# Patient Record
Sex: FEMALE | Race: WHITE | NOT HISPANIC OR LATINO | Employment: OTHER | URBAN - METROPOLITAN AREA
[De-identification: names, ages, dates, MRNs, and addresses within clinical notes are randomized per-mention and may not be internally consistent; named-entity substitution may affect disease eponyms.]

---

## 2017-03-23 ENCOUNTER — COMMUNICATION - HEALTHEAST (OUTPATIENT)
Dept: FAMILY MEDICINE | Facility: CLINIC | Age: 82
End: 2017-03-23

## 2017-03-23 ENCOUNTER — OFFICE VISIT - HEALTHEAST (OUTPATIENT)
Dept: FAMILY MEDICINE | Facility: CLINIC | Age: 82
End: 2017-03-23

## 2017-03-23 DIAGNOSIS — R73.09 ELEVATED GLUCOSE: ICD-10-CM

## 2017-03-23 DIAGNOSIS — I25.10 ATHEROSCLEROSIS OF CORONARY ARTERY: ICD-10-CM

## 2017-03-23 DIAGNOSIS — I10 BENIGN ESSENTIAL HYPERTENSION: ICD-10-CM

## 2017-07-06 ENCOUNTER — COMMUNICATION - HEALTHEAST (OUTPATIENT)
Dept: FAMILY MEDICINE | Facility: CLINIC | Age: 82
End: 2017-07-06

## 2017-07-06 DIAGNOSIS — I10 BENIGN ESSENTIAL HYPERTENSION: ICD-10-CM

## 2017-10-01 ENCOUNTER — COMMUNICATION - HEALTHEAST (OUTPATIENT)
Dept: FAMILY MEDICINE | Facility: CLINIC | Age: 82
End: 2017-10-01

## 2017-10-01 DIAGNOSIS — I10 BENIGN ESSENTIAL HYPERTENSION: ICD-10-CM

## 2017-12-29 ENCOUNTER — COMMUNICATION - HEALTHEAST (OUTPATIENT)
Dept: FAMILY MEDICINE | Facility: CLINIC | Age: 82
End: 2017-12-29

## 2017-12-29 DIAGNOSIS — E78.00 PURE HYPERCHOLESTEROLEMIA: ICD-10-CM

## 2018-04-02 ENCOUNTER — OFFICE VISIT - HEALTHEAST (OUTPATIENT)
Dept: FAMILY MEDICINE | Facility: CLINIC | Age: 83
End: 2018-04-02

## 2018-04-02 DIAGNOSIS — E78.00 PURE HYPERCHOLESTEROLEMIA: ICD-10-CM

## 2018-04-02 DIAGNOSIS — I10 BENIGN ESSENTIAL HYPERTENSION: ICD-10-CM

## 2018-04-02 DIAGNOSIS — I10 WHITE COAT SYNDROME WITH HYPERTENSION: ICD-10-CM

## 2018-04-02 DIAGNOSIS — I25.10 ATHEROSCLEROSIS OF CORONARY ARTERY: ICD-10-CM

## 2018-04-02 DIAGNOSIS — R01.1 HEART MURMUR: ICD-10-CM

## 2018-04-02 LAB
ALBUMIN SERPL-MCNC: 4.2 G/DL (ref 3.5–5)
ALP SERPL-CCNC: 35 U/L (ref 45–120)
ALT SERPL W P-5'-P-CCNC: 25 U/L (ref 0–45)
ANION GAP SERPL CALCULATED.3IONS-SCNC: 8 MMOL/L (ref 5–18)
AST SERPL W P-5'-P-CCNC: 30 U/L (ref 0–40)
BILIRUB SERPL-MCNC: 0.5 MG/DL (ref 0–1)
BUN SERPL-MCNC: 11 MG/DL (ref 8–28)
CALCIUM SERPL-MCNC: 10.1 MG/DL (ref 8.5–10.5)
CHLORIDE BLD-SCNC: 103 MMOL/L (ref 98–107)
CHOLEST SERPL-MCNC: 183 MG/DL
CO2 SERPL-SCNC: 31 MMOL/L (ref 22–31)
CREAT SERPL-MCNC: 0.88 MG/DL (ref 0.6–1.1)
FASTING STATUS PATIENT QL REPORTED: NO
GFR SERPL CREATININE-BSD FRML MDRD: >60 ML/MIN/1.73M2
GLUCOSE BLD-MCNC: 104 MG/DL (ref 70–125)
HDLC SERPL-MCNC: 67 MG/DL
LDLC SERPL CALC-MCNC: 92 MG/DL
POTASSIUM BLD-SCNC: 4.4 MMOL/L (ref 3.5–5)
PROT SERPL-MCNC: 7.4 G/DL (ref 6–8)
SODIUM SERPL-SCNC: 142 MMOL/L (ref 136–145)
TRIGL SERPL-MCNC: 121 MG/DL

## 2018-04-03 ENCOUNTER — COMMUNICATION - HEALTHEAST (OUTPATIENT)
Dept: FAMILY MEDICINE | Facility: CLINIC | Age: 83
End: 2018-04-03

## 2018-04-08 ENCOUNTER — COMMUNICATION - HEALTHEAST (OUTPATIENT)
Dept: FAMILY MEDICINE | Facility: CLINIC | Age: 83
End: 2018-04-08

## 2018-09-18 ENCOUNTER — OFFICE VISIT - HEALTHEAST (OUTPATIENT)
Dept: FAMILY MEDICINE | Facility: CLINIC | Age: 83
End: 2018-09-18

## 2018-09-18 DIAGNOSIS — E78.00 PURE HYPERCHOLESTEROLEMIA: ICD-10-CM

## 2018-09-18 DIAGNOSIS — I10 BENIGN ESSENTIAL HYPERTENSION: ICD-10-CM

## 2018-12-19 ENCOUNTER — COMMUNICATION - HEALTHEAST (OUTPATIENT)
Dept: FAMILY MEDICINE | Facility: CLINIC | Age: 83
End: 2018-12-19

## 2018-12-19 DIAGNOSIS — I10 BENIGN ESSENTIAL HYPERTENSION: ICD-10-CM

## 2018-12-19 DIAGNOSIS — E78.00 PURE HYPERCHOLESTEROLEMIA: ICD-10-CM

## 2019-03-06 ENCOUNTER — COMMUNICATION - HEALTHEAST (OUTPATIENT)
Dept: SCHEDULING | Facility: CLINIC | Age: 84
End: 2019-03-06

## 2019-03-06 DIAGNOSIS — I10 BENIGN ESSENTIAL HYPERTENSION: ICD-10-CM

## 2019-03-06 DIAGNOSIS — E78.00 PURE HYPERCHOLESTEROLEMIA: ICD-10-CM

## 2019-03-11 ENCOUNTER — COMMUNICATION - HEALTHEAST (OUTPATIENT)
Dept: FAMILY MEDICINE | Facility: CLINIC | Age: 84
End: 2019-03-11

## 2019-03-11 DIAGNOSIS — I10 BENIGN ESSENTIAL HYPERTENSION: ICD-10-CM

## 2019-03-12 ENCOUNTER — AMBULATORY - HEALTHEAST (OUTPATIENT)
Dept: FAMILY MEDICINE | Facility: CLINIC | Age: 84
End: 2019-03-12

## 2019-03-12 ENCOUNTER — COMMUNICATION - HEALTHEAST (OUTPATIENT)
Dept: FAMILY MEDICINE | Facility: CLINIC | Age: 84
End: 2019-03-12

## 2019-03-12 DIAGNOSIS — E78.00 PURE HYPERCHOLESTEROLEMIA: ICD-10-CM

## 2019-04-24 ENCOUNTER — COMMUNICATION - HEALTHEAST (OUTPATIENT)
Dept: FAMILY MEDICINE | Facility: CLINIC | Age: 84
End: 2019-04-24

## 2019-06-25 ENCOUNTER — OFFICE VISIT - HEALTHEAST (OUTPATIENT)
Dept: FAMILY MEDICINE | Facility: CLINIC | Age: 84
End: 2019-06-25

## 2019-06-25 ENCOUNTER — COMMUNICATION - HEALTHEAST (OUTPATIENT)
Dept: FAMILY MEDICINE | Facility: CLINIC | Age: 84
End: 2019-06-25

## 2019-06-25 DIAGNOSIS — Z01.818 ENCOUNTER FOR PREOPERATIVE EXAMINATION FOR GENERAL SURGICAL PROCEDURE: ICD-10-CM

## 2019-06-25 DIAGNOSIS — E78.00 PURE HYPERCHOLESTEROLEMIA: ICD-10-CM

## 2019-06-25 DIAGNOSIS — I25.2 HISTORY OF MYOCARDIAL INFARCTION: ICD-10-CM

## 2019-06-25 DIAGNOSIS — I10 BENIGN ESSENTIAL HYPERTENSION: ICD-10-CM

## 2019-06-25 LAB
ANION GAP SERPL CALCULATED.3IONS-SCNC: 15 MMOL/L (ref 5–18)
BUN SERPL-MCNC: 12 MG/DL (ref 8–28)
CALCIUM SERPL-MCNC: 10.7 MG/DL (ref 8.5–10.5)
CHLORIDE BLD-SCNC: 100 MMOL/L (ref 98–107)
CO2 SERPL-SCNC: 26 MMOL/L (ref 22–31)
CREAT SERPL-MCNC: 0.99 MG/DL (ref 0.6–1.1)
GFR SERPL CREATININE-BSD FRML MDRD: 54 ML/MIN/1.73M2
GLUCOSE BLD-MCNC: 91 MG/DL (ref 70–125)
POTASSIUM BLD-SCNC: 4 MMOL/L (ref 3.5–5)
SODIUM SERPL-SCNC: 141 MMOL/L (ref 136–145)

## 2019-06-25 ASSESSMENT — MIFFLIN-ST. JEOR: SCORE: 971.38

## 2019-06-27 ENCOUNTER — COMMUNICATION - HEALTHEAST (OUTPATIENT)
Dept: FAMILY MEDICINE | Facility: CLINIC | Age: 84
End: 2019-06-27

## 2019-06-28 ENCOUNTER — RECORDS - HEALTHEAST (OUTPATIENT)
Dept: ADMINISTRATIVE | Facility: OTHER | Age: 84
End: 2019-06-28

## 2019-12-13 ENCOUNTER — COMMUNICATION - HEALTHEAST (OUTPATIENT)
Dept: FAMILY MEDICINE | Facility: CLINIC | Age: 84
End: 2019-12-13

## 2019-12-13 DIAGNOSIS — E78.00 PURE HYPERCHOLESTEROLEMIA: ICD-10-CM

## 2019-12-14 ENCOUNTER — COMMUNICATION - HEALTHEAST (OUTPATIENT)
Dept: FAMILY MEDICINE | Facility: CLINIC | Age: 84
End: 2019-12-14

## 2019-12-14 DIAGNOSIS — I10 BENIGN ESSENTIAL HYPERTENSION: ICD-10-CM

## 2020-03-10 ENCOUNTER — RECORDS - HEALTHEAST (OUTPATIENT)
Dept: ADMINISTRATIVE | Facility: OTHER | Age: 85
End: 2020-03-10

## 2020-03-13 ENCOUNTER — COMMUNICATION - HEALTHEAST (OUTPATIENT)
Dept: FAMILY MEDICINE | Facility: CLINIC | Age: 85
End: 2020-03-13

## 2020-03-13 DIAGNOSIS — I10 BENIGN ESSENTIAL HYPERTENSION: ICD-10-CM

## 2020-06-05 ENCOUNTER — COMMUNICATION - HEALTHEAST (OUTPATIENT)
Dept: FAMILY MEDICINE | Facility: CLINIC | Age: 85
End: 2020-06-05

## 2020-06-05 DIAGNOSIS — E78.00 PURE HYPERCHOLESTEROLEMIA: ICD-10-CM

## 2020-06-05 DIAGNOSIS — I10 BENIGN ESSENTIAL HYPERTENSION: ICD-10-CM

## 2020-06-08 ENCOUNTER — COMMUNICATION - HEALTHEAST (OUTPATIENT)
Dept: SCHEDULING | Facility: CLINIC | Age: 85
End: 2020-06-08

## 2020-06-08 DIAGNOSIS — E78.00 PURE HYPERCHOLESTEROLEMIA: ICD-10-CM

## 2020-06-08 DIAGNOSIS — I10 BENIGN ESSENTIAL HYPERTENSION: ICD-10-CM

## 2020-06-22 ENCOUNTER — COMMUNICATION - HEALTHEAST (OUTPATIENT)
Dept: FAMILY MEDICINE | Facility: CLINIC | Age: 85
End: 2020-06-22

## 2020-06-22 ENCOUNTER — OFFICE VISIT - HEALTHEAST (OUTPATIENT)
Dept: FAMILY MEDICINE | Facility: CLINIC | Age: 85
End: 2020-06-22

## 2020-06-22 DIAGNOSIS — I25.10 ATHEROSCLEROSIS OF CORONARY ARTERY OF NATIVE HEART WITHOUT ANGINA PECTORIS, UNSPECIFIED VESSEL OR LESION TYPE: ICD-10-CM

## 2020-06-22 DIAGNOSIS — E78.00 PURE HYPERCHOLESTEROLEMIA: ICD-10-CM

## 2020-06-22 DIAGNOSIS — I10 BENIGN ESSENTIAL HYPERTENSION: ICD-10-CM

## 2020-06-22 ASSESSMENT — PATIENT HEALTH QUESTIONNAIRE - PHQ9: SUM OF ALL RESPONSES TO PHQ QUESTIONS 1-9: 1

## 2020-06-24 ENCOUNTER — COMMUNICATION - HEALTHEAST (OUTPATIENT)
Dept: SCHEDULING | Facility: CLINIC | Age: 85
End: 2020-06-24

## 2020-06-24 ENCOUNTER — AMBULATORY - HEALTHEAST (OUTPATIENT)
Dept: LAB | Facility: CLINIC | Age: 85
End: 2020-06-24

## 2020-06-24 ENCOUNTER — AMBULATORY - HEALTHEAST (OUTPATIENT)
Dept: NURSING | Facility: CLINIC | Age: 85
End: 2020-06-24

## 2020-06-24 DIAGNOSIS — I10 WHITE COAT SYNDROME WITH HYPERTENSION: ICD-10-CM

## 2020-06-24 DIAGNOSIS — I10 BENIGN ESSENTIAL HYPERTENSION: ICD-10-CM

## 2020-06-24 LAB
ALBUMIN SERPL-MCNC: 4.3 G/DL (ref 3.5–5)
ALP SERPL-CCNC: 35 U/L (ref 45–120)
ALT SERPL W P-5'-P-CCNC: 18 U/L (ref 0–45)
ANION GAP SERPL CALCULATED.3IONS-SCNC: 13 MMOL/L (ref 5–18)
AST SERPL W P-5'-P-CCNC: 29 U/L (ref 0–40)
BILIRUB SERPL-MCNC: 0.7 MG/DL (ref 0–1)
BUN SERPL-MCNC: 12 MG/DL (ref 8–28)
CALCIUM SERPL-MCNC: 9.9 MG/DL (ref 8.5–10.5)
CHLORIDE BLD-SCNC: 102 MMOL/L (ref 98–107)
CO2 SERPL-SCNC: 26 MMOL/L (ref 22–31)
CREAT SERPL-MCNC: 1.03 MG/DL (ref 0.6–1.1)
GFR SERPL CREATININE-BSD FRML MDRD: 51 ML/MIN/1.73M2
GLUCOSE BLD-MCNC: 149 MG/DL (ref 70–125)
POTASSIUM BLD-SCNC: 3.3 MMOL/L (ref 3.5–5)
PROT SERPL-MCNC: 7.1 G/DL (ref 6–8)
SODIUM SERPL-SCNC: 141 MMOL/L (ref 136–145)

## 2020-06-25 ENCOUNTER — COMMUNICATION - HEALTHEAST (OUTPATIENT)
Dept: FAMILY MEDICINE | Facility: CLINIC | Age: 85
End: 2020-06-25

## 2020-09-28 ENCOUNTER — COMMUNICATION - HEALTHEAST (OUTPATIENT)
Dept: FAMILY MEDICINE | Facility: CLINIC | Age: 85
End: 2020-09-28

## 2020-09-28 DIAGNOSIS — E78.00 PURE HYPERCHOLESTEROLEMIA: ICD-10-CM

## 2020-09-28 DIAGNOSIS — I10 BENIGN ESSENTIAL HYPERTENSION: ICD-10-CM

## 2020-09-30 ENCOUNTER — COMMUNICATION - HEALTHEAST (OUTPATIENT)
Dept: FAMILY MEDICINE | Facility: CLINIC | Age: 85
End: 2020-09-30

## 2020-09-30 ENCOUNTER — OFFICE VISIT - HEALTHEAST (OUTPATIENT)
Dept: FAMILY MEDICINE | Facility: CLINIC | Age: 85
End: 2020-09-30

## 2020-09-30 DIAGNOSIS — E78.00 PURE HYPERCHOLESTEROLEMIA: ICD-10-CM

## 2020-09-30 DIAGNOSIS — I10 BENIGN ESSENTIAL HYPERTENSION: ICD-10-CM

## 2020-09-30 LAB
ANION GAP SERPL CALCULATED.3IONS-SCNC: 11 MMOL/L (ref 5–18)
BUN SERPL-MCNC: 16 MG/DL (ref 8–28)
CALCIUM SERPL-MCNC: 10.2 MG/DL (ref 8.5–10.5)
CHLORIDE BLD-SCNC: 101 MMOL/L (ref 98–107)
CHOLEST SERPL-MCNC: 199 MG/DL
CO2 SERPL-SCNC: 29 MMOL/L (ref 22–31)
CREAT SERPL-MCNC: 0.88 MG/DL (ref 0.6–1.1)
ERYTHROCYTE [DISTWIDTH] IN BLOOD BY AUTOMATED COUNT: 17.9 % (ref 11–14.5)
FASTING STATUS PATIENT QL REPORTED: NO
GFR SERPL CREATININE-BSD FRML MDRD: >60 ML/MIN/1.73M2
GLUCOSE BLD-MCNC: 121 MG/DL (ref 70–125)
HCT VFR BLD AUTO: 36.6 % (ref 35–47)
HDLC SERPL-MCNC: 77 MG/DL
HGB BLD-MCNC: 12.2 G/DL (ref 12–16)
LDLC SERPL CALC-MCNC: 104 MG/DL
MCH RBC QN AUTO: 31.3 PG (ref 27–34)
MCHC RBC AUTO-ENTMCNC: 33.3 G/DL (ref 32–36)
MCV RBC AUTO: 94 FL (ref 80–100)
PLATELET # BLD AUTO: 252 THOU/UL (ref 140–440)
PMV BLD AUTO: 12.4 FL (ref 8.5–12.5)
POTASSIUM BLD-SCNC: 3.8 MMOL/L (ref 3.5–5)
RBC # BLD AUTO: 3.9 MILL/UL (ref 3.8–5.4)
SODIUM SERPL-SCNC: 141 MMOL/L (ref 136–145)
TRIGL SERPL-MCNC: 92 MG/DL
WBC: 6.2 THOU/UL (ref 4–11)

## 2020-09-30 ASSESSMENT — MIFFLIN-ST. JEOR: SCORE: 892.45

## 2021-03-05 ENCOUNTER — OFFICE VISIT - HEALTHEAST (OUTPATIENT)
Dept: FAMILY MEDICINE | Facility: CLINIC | Age: 86
End: 2021-03-05

## 2021-03-05 DIAGNOSIS — R41.3 MEMORY DEFICITS: ICD-10-CM

## 2021-03-05 DIAGNOSIS — F33.9 EPISODE OF RECURRENT MAJOR DEPRESSIVE DISORDER, UNSPECIFIED DEPRESSION EPISODE SEVERITY (H): ICD-10-CM

## 2021-03-05 DIAGNOSIS — I73.9 PERIPHERAL VASCULAR DISEASE (H): ICD-10-CM

## 2021-03-05 DIAGNOSIS — F41.9 ANXIETY: ICD-10-CM

## 2021-03-05 LAB
ALBUMIN SERPL-MCNC: 4.3 G/DL (ref 3.5–5)
ALP SERPL-CCNC: 39 U/L (ref 45–120)
ALT SERPL W P-5'-P-CCNC: 22 U/L (ref 0–45)
ANION GAP SERPL CALCULATED.3IONS-SCNC: 14 MMOL/L (ref 5–18)
AST SERPL W P-5'-P-CCNC: 30 U/L (ref 0–40)
BASOPHILS # BLD AUTO: 0.1 THOU/UL (ref 0–0.2)
BASOPHILS NFR BLD AUTO: 1 % (ref 0–2)
BILIRUB SERPL-MCNC: 0.5 MG/DL (ref 0–1)
BUN SERPL-MCNC: 11 MG/DL (ref 8–28)
CALCIUM SERPL-MCNC: 10.1 MG/DL (ref 8.5–10.5)
CHLORIDE BLD-SCNC: 100 MMOL/L (ref 98–107)
CO2 SERPL-SCNC: 29 MMOL/L (ref 22–31)
CREAT SERPL-MCNC: 0.83 MG/DL (ref 0.6–1.1)
EOSINOPHIL # BLD AUTO: 0.1 THOU/UL (ref 0–0.4)
EOSINOPHIL NFR BLD AUTO: 2 % (ref 0–6)
ERYTHROCYTE [DISTWIDTH] IN BLOOD BY AUTOMATED COUNT: 17.7 % (ref 11–14.5)
FOLATE SERPL-MCNC: 17.3 NG/ML
GFR SERPL CREATININE-BSD FRML MDRD: >60 ML/MIN/1.73M2
GLUCOSE BLD-MCNC: 128 MG/DL (ref 70–125)
HCT VFR BLD AUTO: 35.6 % (ref 35–47)
HGB BLD-MCNC: 12 G/DL (ref 12–16)
IMM GRANULOCYTES # BLD: 0 THOU/UL
IMM GRANULOCYTES NFR BLD: 0 %
LYMPHOCYTES # BLD AUTO: 1.5 THOU/UL (ref 0.8–4.4)
LYMPHOCYTES NFR BLD AUTO: 26 % (ref 20–40)
MCH RBC QN AUTO: 31.5 PG (ref 27–34)
MCHC RBC AUTO-ENTMCNC: 33.7 G/DL (ref 32–36)
MCV RBC AUTO: 93 FL (ref 80–100)
MONOCYTES # BLD AUTO: 0.7 THOU/UL (ref 0–0.9)
MONOCYTES NFR BLD AUTO: 12 % (ref 2–10)
NEUTROPHILS # BLD AUTO: 3.5 THOU/UL (ref 2–7.7)
NEUTROPHILS NFR BLD AUTO: 60 % (ref 50–70)
PLATELET # BLD AUTO: 239 THOU/UL (ref 140–440)
PMV BLD AUTO: 10.2 FL (ref 7–10)
POTASSIUM BLD-SCNC: 3.3 MMOL/L (ref 3.5–5)
PROT SERPL-MCNC: 7 G/DL (ref 6–8)
RBC # BLD AUTO: 3.81 MILL/UL (ref 3.8–5.4)
SODIUM SERPL-SCNC: 143 MMOL/L (ref 136–145)
TSH SERPL DL<=0.005 MIU/L-ACNC: 1.34 UIU/ML (ref 0.3–5)
VIT B12 SERPL-MCNC: 862 PG/ML (ref 213–816)
WBC: 5.8 THOU/UL (ref 4–11)

## 2021-03-05 ASSESSMENT — ANXIETY QUESTIONNAIRES
GAD7 TOTAL SCORE: 10
6. BECOMING EASILY ANNOYED OR IRRITABLE: NOT AT ALL
1. FEELING NERVOUS, ANXIOUS, OR ON EDGE: NOT AT ALL
3. WORRYING TOO MUCH ABOUT DIFFERENT THINGS: NEARLY EVERY DAY
IF YOU CHECKED OFF ANY PROBLEMS ON THIS QUESTIONNAIRE, HOW DIFFICULT HAVE THESE PROBLEMS MADE IT FOR YOU TO DO YOUR WORK, TAKE CARE OF THINGS AT HOME, OR GET ALONG WITH OTHER PEOPLE: SOMEWHAT DIFFICULT
5. BEING SO RESTLESS THAT IT IS HARD TO SIT STILL: NOT AT ALL
2. NOT BEING ABLE TO STOP OR CONTROL WORRYING: NEARLY EVERY DAY
4. TROUBLE RELAXING: SEVERAL DAYS
7. FEELING AFRAID AS IF SOMETHING AWFUL MIGHT HAPPEN: NEARLY EVERY DAY

## 2021-03-05 ASSESSMENT — PATIENT HEALTH QUESTIONNAIRE - PHQ9: SUM OF ALL RESPONSES TO PHQ QUESTIONS 1-9: 8

## 2021-03-05 ASSESSMENT — MIFFLIN-ST. JEOR: SCORE: 905.15

## 2021-03-06 LAB — T PALLIDUM AB SER QL: NEGATIVE

## 2021-03-09 ENCOUNTER — COMMUNICATION - HEALTHEAST (OUTPATIENT)
Dept: FAMILY MEDICINE | Facility: CLINIC | Age: 86
End: 2021-03-09

## 2021-03-12 ENCOUNTER — COMMUNICATION - HEALTHEAST (OUTPATIENT)
Dept: FAMILY MEDICINE | Facility: CLINIC | Age: 86
End: 2021-03-12

## 2021-03-28 ENCOUNTER — SURGERY - HEALTHEAST (OUTPATIENT)
Dept: CARDIOLOGY | Facility: HOSPITAL | Age: 86
End: 2021-03-28

## 2021-03-28 ENCOUNTER — COMMUNICATION - HEALTHEAST (OUTPATIENT)
Dept: SCHEDULING | Facility: CLINIC | Age: 86
End: 2021-03-28

## 2021-03-28 ASSESSMENT — MIFFLIN-ST. JEOR: SCORE: 914.91

## 2021-04-13 ENCOUNTER — OFFICE VISIT - HEALTHEAST (OUTPATIENT)
Dept: FAMILY MEDICINE | Facility: CLINIC | Age: 86
End: 2021-04-13

## 2021-04-13 DIAGNOSIS — I10 ESSENTIAL HYPERTENSION: ICD-10-CM

## 2021-04-13 DIAGNOSIS — R41.3 MEMORY DEFICITS: ICD-10-CM

## 2021-04-13 DIAGNOSIS — R41.89 COGNITIVE AND BEHAVIORAL CHANGES: ICD-10-CM

## 2021-04-13 DIAGNOSIS — E78.5 DYSLIPIDEMIA, GOAL LDL BELOW 70: ICD-10-CM

## 2021-04-13 DIAGNOSIS — F33.1 MODERATE RECURRENT MAJOR DEPRESSION (H): ICD-10-CM

## 2021-04-13 DIAGNOSIS — E87.6 HYPOKALEMIA: ICD-10-CM

## 2021-04-13 DIAGNOSIS — F05 SUNDOWNING: ICD-10-CM

## 2021-04-13 DIAGNOSIS — R46.89 COGNITIVE AND BEHAVIORAL CHANGES: ICD-10-CM

## 2021-04-13 DIAGNOSIS — G47.9 SLEEP DIFFICULTIES: ICD-10-CM

## 2021-04-13 DIAGNOSIS — R94.31 ABNORMAL ELECTROCARDIOGRAM: ICD-10-CM

## 2021-04-13 DIAGNOSIS — L91.8 SKIN TAG: ICD-10-CM

## 2021-04-13 DIAGNOSIS — D64.9 ANEMIA, UNSPECIFIED TYPE: ICD-10-CM

## 2021-04-13 DIAGNOSIS — Z09 HOSPITAL DISCHARGE FOLLOW-UP: ICD-10-CM

## 2021-04-13 DIAGNOSIS — I21.4 ACUTE NON-ST ELEVATION MYOCARDIAL INFARCTION (NSTEMI) (H): ICD-10-CM

## 2021-04-13 LAB
ANION GAP SERPL CALCULATED.3IONS-SCNC: 13 MMOL/L (ref 5–18)
BUN SERPL-MCNC: 11 MG/DL (ref 8–28)
CALCIUM SERPL-MCNC: 9.4 MG/DL (ref 8.5–10.5)
CHLORIDE BLD-SCNC: 103 MMOL/L (ref 98–107)
CO2 SERPL-SCNC: 26 MMOL/L (ref 22–31)
CREAT SERPL-MCNC: 0.82 MG/DL (ref 0.6–1.1)
ERYTHROCYTE [DISTWIDTH] IN BLOOD BY AUTOMATED COUNT: 18.3 % (ref 11–14.5)
GFR SERPL CREATININE-BSD FRML MDRD: >60 ML/MIN/1.73M2
GLUCOSE BLD-MCNC: 92 MG/DL (ref 70–125)
HCT VFR BLD AUTO: 33.9 % (ref 35–47)
HGB BLD-MCNC: 11.2 G/DL (ref 12–16)
MCH RBC QN AUTO: 31.3 PG (ref 27–34)
MCHC RBC AUTO-ENTMCNC: 33 G/DL (ref 32–36)
MCV RBC AUTO: 95 FL (ref 80–100)
PLATELET # BLD AUTO: 250 THOU/UL (ref 140–440)
PMV BLD AUTO: 10.1 FL (ref 7–10)
POTASSIUM BLD-SCNC: 4.1 MMOL/L (ref 3.5–5)
RBC # BLD AUTO: 3.58 MILL/UL (ref 3.8–5.4)
SODIUM SERPL-SCNC: 142 MMOL/L (ref 136–145)
WBC: 6.5 THOU/UL (ref 4–11)

## 2021-04-15 ENCOUNTER — OFFICE VISIT - HEALTHEAST (OUTPATIENT)
Dept: CARDIOLOGY | Facility: CLINIC | Age: 86
End: 2021-04-15

## 2021-04-15 DIAGNOSIS — R46.89 COGNITIVE AND BEHAVIORAL CHANGES: ICD-10-CM

## 2021-04-15 DIAGNOSIS — R41.89 COGNITIVE AND BEHAVIORAL CHANGES: ICD-10-CM

## 2021-04-15 DIAGNOSIS — I25.10 CORONARY ARTERY DISEASE DUE TO LIPID RICH PLAQUE: ICD-10-CM

## 2021-04-15 DIAGNOSIS — E87.6 HYPOKALEMIA: ICD-10-CM

## 2021-04-15 DIAGNOSIS — I25.83 CORONARY ARTERY DISEASE DUE TO LIPID RICH PLAQUE: ICD-10-CM

## 2021-04-15 LAB
FERRITIN SERPL-MCNC: 258 NG/ML (ref 10–130)
IRON SATN MFR SERPL: 24 % (ref 20–50)
IRON SERPL-MCNC: 70 UG/DL (ref 42–175)
TIBC SERPL-MCNC: 286 UG/DL (ref 313–563)
TRANSFERRIN SERPL-MCNC: 229 MG/DL (ref 212–360)

## 2021-04-15 ASSESSMENT — MIFFLIN-ST. JEOR: SCORE: 915.81

## 2021-04-21 ENCOUNTER — COMMUNICATION - HEALTHEAST (OUTPATIENT)
Dept: FAMILY MEDICINE | Facility: CLINIC | Age: 86
End: 2021-04-21

## 2021-04-23 ENCOUNTER — COMMUNICATION - HEALTHEAST (OUTPATIENT)
Dept: FAMILY MEDICINE | Facility: CLINIC | Age: 86
End: 2021-04-23

## 2021-05-03 ENCOUNTER — COMMUNICATION - HEALTHEAST (OUTPATIENT)
Dept: FAMILY MEDICINE | Facility: CLINIC | Age: 86
End: 2021-05-03

## 2021-05-03 DIAGNOSIS — I10 ESSENTIAL HYPERTENSION: ICD-10-CM

## 2021-05-03 DIAGNOSIS — I25.10 CORONARY ARTERY DISEASE DUE TO LIPID RICH PLAQUE: ICD-10-CM

## 2021-05-03 DIAGNOSIS — I21.4 ACUTE NON-ST ELEVATION MYOCARDIAL INFARCTION (NSTEMI) (H): ICD-10-CM

## 2021-05-03 DIAGNOSIS — I25.83 CORONARY ARTERY DISEASE DUE TO LIPID RICH PLAQUE: ICD-10-CM

## 2021-05-17 ENCOUNTER — COMMUNICATION - HEALTHEAST (OUTPATIENT)
Dept: SCHEDULING | Facility: CLINIC | Age: 86
End: 2021-05-17

## 2021-05-21 ENCOUNTER — RECORDS - HEALTHEAST (OUTPATIENT)
Dept: ADMINISTRATIVE | Facility: OTHER | Age: 86
End: 2021-05-21

## 2021-05-21 ENCOUNTER — OFFICE VISIT (OUTPATIENT)
Dept: NEUROPSYCHOLOGY | Facility: CLINIC | Age: 86
End: 2021-05-21
Payer: COMMERCIAL

## 2021-05-21 ENCOUNTER — COMMUNICATION - HEALTHEAST (OUTPATIENT)
Dept: FAMILY MEDICINE | Facility: CLINIC | Age: 86
End: 2021-05-21

## 2021-05-21 DIAGNOSIS — F41.9 CHRONIC ANXIETY: ICD-10-CM

## 2021-05-21 DIAGNOSIS — F39 MOOD DISORDER (H): ICD-10-CM

## 2021-05-21 DIAGNOSIS — F03.91 DEMENTIA WITH BEHAVIORAL DISTURBANCE, UNSPECIFIED DEMENTIA TYPE: Primary | ICD-10-CM

## 2021-05-21 PROCEDURE — 96138 PSYCL/NRPSYC TECH 1ST: CPT | Performed by: CLINICAL NEUROPSYCHOLOGIST

## 2021-05-21 PROCEDURE — 96116 NUBHVL XM PHYS/QHP 1ST HR: CPT | Performed by: CLINICAL NEUROPSYCHOLOGIST

## 2021-05-21 PROCEDURE — 96133 NRPSYC TST EVAL PHYS/QHP EA: CPT | Performed by: CLINICAL NEUROPSYCHOLOGIST

## 2021-05-21 PROCEDURE — 96139 PSYCL/NRPSYC TST TECH EA: CPT | Performed by: CLINICAL NEUROPSYCHOLOGIST

## 2021-05-21 PROCEDURE — 96132 NRPSYC TST EVAL PHYS/QHP 1ST: CPT | Performed by: CLINICAL NEUROPSYCHOLOGIST

## 2021-05-21 NOTE — PROGRESS NOTES
Adult Neuropsychology Clinic  [facility]      NEUROPSYCHOLOGICAL EVALUATION    RELEVANT HISTORY AND REASON FOR REFERRAL    This is a report of neuropsychological consultation regarding Crista Harman, an 86-year-old woman who presents with concerns about cognitive decline and worsening mood and anxiety. Medical history includes myocardial infarction perhaps 15-20 years ago, coronary artery disease, hypertension, hypercholesterolemia, heart murmur, sinus bradycardia, and peripheral vascular disease. Per information gathered today, there is likely longstanding generalized anxiety. She saw Dr. Alva Zimmer in the [facility] system for a family medicine visit in March, accompanied by her daughter-in-law. The family had more concerns about cognitive decline than Ms. Harman did. She was resistant to the idea of further workups, but the family wanted to pursue it. Mood and anxiety problems were described as reactions to being confronted with memory lapses. Scores were elevated on the PHQ-9 (8) and LACHO-7 (10), and she endorsed suicidal ideation on the PHQ-9. Dr. Zimmer started her on escitalopram and placed the referral for neuropsychological assessment of brain functioning.     [facility]. I was connected to Ms. Harman remotely via video conference to conduct the clinical interview. I was in telephone contact with my psychometrist to oversee the testing session, which was done in person. Another neuropsychologist from our clinic was on-site to provide any face-to-face emergency support if needed.     Ms. Harman was accompanied to today's evaluation by her son, Anthony. She told me she just notices nondescript forgetfulness at times, for less than a year. It has seemed stable or not clearly progressive to her. Her son said he suspects changes began on the order of 6 or 7 years ago, in concert with increased worry about memory problems after a friend with cognitive  decline . His view is that there has been a slow progression and apparent acceleration over the last year. About 3 years ago, a close friend of hers called him to raise concerns about her memory. Over the last year, things have become undeniably problematic. Anthony took over managing her finances about a year ago. She is no longer driving, and she said today that driving feels like an unsafe activity for her. She relies on her sons and daughter-in-law for transportation.     On 3/30/2021, she was in the hospital for a coronary angiogram. She was anesthetized for the procedure and then had a period of delirium that lasted several days. She was agitated, combative, confused, and delusional. She was put into four soft limb restraints at one point. She has no memory of her time in the hospital. I see associated records saying she was treated with olanzapine. The records describe a negative workup including head CT, brain MRI, EEG, UA, and ammonia. The brain MRI showed moderate cerebral volume loss and moderate burden of small vessel ischemic changes. Cognitive screening with the SLUMS was highly abnormal, with a score of 8/30 and losing points in orientation, simple mathematics, naming, repetition, immediate recall, delayed recall, and clock drawing. Assessment with the Short Blessed Test was also abnormal (total weighted score 21), and the OT provider felt she needed 24/7 supervision for safety and ADLs.     Ms. Harman had been living alone, but after the delirium episode, she moved in with her other son and daughter-in-law. Her current medication list includes escitalopram, quetiapine, melatonin, 81 mg aspirin, clopidogrel, nitroglycerin, hydrochlorothiazide, atorvastatin, isosorbide mononitrate, vitamin A, vitamin D3, and multivitamin. Ms. Harman cannot tell me what any of her daily medications are today. Her daughter-in-law is now managing her medications. Per notes from Dr. Zimmer, her daughter-in-law has also  established shrestha of .    There are no neuroimaging studies in the records available to me. She and her son report no history of stroke, seizure, TBI, migraine, motoric dyscontrol/parkinsonism, neurologic infections, or CNS tumors. She reports no changes to her senses of smell, taste, or hearing. She had a detached retina about 1 or 2 years ago that was repaired. She might be developing cataracts. She denies any episodic abnormalities with her vision.    Aside from her period of post-anesthesia delirium, there have been no indications of potential hallucinations or delusional thought processes.    Per records from [facility], quetiapine was added to her medication regimen in April for treatment of sundowning patterns. When asked about evening medications, only melatonin is mentioned today. Ms. Harman feels like her sleep is okay. She says she takes melatonin just every once in a while. There are no indications of REM sleep behaviors.    She reports rare alcohol use and no history of alcohol problems. She quit smoking after her myocardial infarction, somewhere around 15-20 years ago. She reports no use of illicit drugs.    She has been eating less. Her son mentioned having to encourage her to eat. She has lost around 20 pounds over the last year. She feels like her energy levels are okay and reports no need for naps. She does not have problems with chronic pain. She has not had a COVID-19 infection, and she has completed her vaccinations. Recent rapid plasma reagin, folate, B12, and TSH labs have been unremarkable.     In the medical records available to me, there are descriptions of longstanding emotional discomfort at clinical visits, such as notes that mention  white coat hypertension.  Ms. Harman and her son describe her as an individual who is constantly worried. This has been the case for most of her life. She worries about anything and everything. The descriptions are indicative of generalized anxiety  disorder. She says she has had no clinical attention to mental health prior to starting escitalopram this year. Her son says she is still taking escitalopram. There is no history of psychiatric hospitalizations. During the initial interview, Ms. Harman denied any history of suicidal ideation. The testing session was cut short today because she was emotionally unable to tolerate it, and she made an indirect remark about self-harm to the psychometrist. In talking with her about it, she described feeling like the tests should have been easy for her and it was upsetting when answers could not be easily produced. She reported no suicidal intent. She told me she would feel safe at home. In talking with her son about her reaction to the testing session, he said that she had been making similar statements in recent months. There is no history of developing plans or taking actions to harm herself. Her son said the family essentially never leaves her alone anymore, because of the cognitive decline. She did not appear to be in imminent danger. I encouraged Ms. Harman and her son to have a low threshold for contacting me at this clinic, contacting the [facility] or [employer] clinical systems, or calling 911 if anything seems to escalate.    Regarding remote history, she reported no early life medical complications or developmental abnormalities. She reported no history of academic delays. She said she always enjoyed school and had a good time with it. She graduated from high school. Her career was spent in secretarial positions. She estimated that she retired about 20 years ago. Her son felt it was more like 12 or 13 years ago. His recollection is that she retired when her  had a decline in his health and needed to go into nursing care. Her  passed away close to 10 years ago. She has her 2 sons.    Reported family history includes a diagnosis of Alzheimer's disease for an uncle. There is no other  known history of neurologic syndromes.    BEHAVIORAL OBSERVATIONS    Ms. Harman was polite and passively cooperative during the interview. There was apparent mild discomfort at answering questions, with a sense of downplaying most concerns and not wanting to go into things too deeply. However, she demonstrated fair insight into the referral question, knowing that today s evaluation involved memory testing and acknowledging some changes in these regards. Comprehension of language was generally appropriate. In open conversation, there seemed to be at least mild difficulties with dysnomia, having to provide descriptions of things rather than the specific names of things. Fund of knowledge was lower than expected, and her son often provided prompts and reminders to boost her recollection of both recent and remote events. Her speech was not dysfluent, dysarthric, or dysporosodic. Mood was neutral at first then tense and dysphoric later in the visit. Affective display was mood-congruent. There were no indications of grossly disorganized thinking or delusional processes.     At the start of the testing session, Ms. Harman was vocal about not wanting to be at the appointment and being upset that her children wanted her to go through with it. As noted above, she had a negative emotional reaction to the testing session. The initiation of memory tests provoked tears, and she became too upset to continue. My psychometrist asked if he could bring her a glass of water or anything, and she suggested that he bring her a gun. I was contacted immediately and then reconnected to the video system to talk with Ms. Harman about her reaction to the testing and to assess her safety. We agreed that it was in her best interest to defer the evaluation. We brought her son back into the room to have further discussions about her emotional state, the immediate plan for personal safety, and the longer term plan for continued clinical care of her  presenting concerns.    Very limited data were gathered. The neuropsychological evaluation should be seen as incomplete from a psychometrics standpoint.     MEASURES ADMINISTERED    The following measures were administered by a trained psychometrist, under my supervision:    Orientation: Time, Place, Basic Personal Information, Recent [place] Presidents; Wide Range Achievement Test 4: Word Reading; Clock Drawing; Brief Visuospatial Memory Test, Revised.     RESULTS AND INTERPRETATION    Orientation was below normal expectations for time, being 5 days off for the date. She was unable to demonstrate any orientation to place, with no guesses for the city we were in or our specific location. Orientation to basic personal information was normal. Orientation to basic cultural information was abnormal, being unable to name the current [place] president or any other presidents from the last 40 years. Performance on a reading and pronunciation test that is validated for estimating premorbid intelligence was in the average range. Clock drawing was abnormal, being unable to set hands on the clock to represent a specified time. A measure of immediate memory for simple geometric designs was attempted, but she was not able to provide any responses over several trials. The testing session had to be discontinued after that, for the negative emotional reaction described above.     IMPRESSIONS     The neuropsychological results are abnormal, but the evaluation is incomplete because Ms. Harman had a negative emotional reaction to cognitive challenge and could not tolerate more than about 20 minutes of testing. She was evaluated with the BRANDENUMS during her delirium about 6 weeks ago. As she was delirious, the SLUMS score of 8/30 at the time likely underestimates her overall state, but clinical observation and the few measures we were able to obtain today indicate her functioning is indeed well below normal expectations. 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 She has troubles with multiple areas of cognitive functioning, and she is no longer able to manage independent living.     She has significant issues with low mood and heightened anxiety that need continued clinical attention, but I do not harbor any suspicion that this is a case of so-called  pseudodementia  of psychogenic origin. She is having understandable reactions to being confronted with the loss of cognitive abilities. Her growing trouble controlling emotions is best seen as a consequence of changing neurologic integrity.     The paucity of cognitive data precludes definitive narrowing of likely etiologies. The recent imaging studies indicate a moderate burden of microangiopathic change, and such cerebrovascular issues likely contribute to her dementia. However, the clinical picture is one of slowly progressive changes over the last several years, with memory impairment, dysnomia, and descriptions of sundowning patterns. This all suggests Alzheimer s disease. 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 I do not see clinical features of Lewy body disease or frontotemporal syndromes.     RECOMMENDATIONS    1. I encouraged Ms. Harman  son to pursue an evaluation with a neurologist, for additional physical assessments and laboratory studies to help narrow the differential.   2. It would be reasonable to talk to a neurologist or Dr. Zimmer about treatments like donepezil, memantine, or rivastigmine.   3. She should follow with a psychiatrist to keep looking for optimal responses to mood and anxiety medications.   4. An environment of safety is paramount. She expresses suicidal ideation but does not appear to be at imminent risk for self-harm. For a mix of cognitive and emotional reasons, I agree with the recent OT assessment that she needs 24/7  oversight for safety. In talking with Ms. Harman and her son after her negative response to testing and the indirect remark about self-harm, she did not appear to be in imminent danger. I encouraged Ms. Harman and her son to have a low threshold for contacting me at this clinic, contacting the [facility] or [employer] systems, or calling 911 if her mood worsens or anything seems to escalate.  5. She has been losing weight and not eating in a normal fashion. Family should be monitoring her nutritional intake. They should talk to Dr. Zimmer about appropriate supplemental measures (e.g., Boost, Ensure, or similar products).   6. I am glad that she is living with family right now. Respite care provisions will be essential. I would encourage family to reach out to the [employer] s [employer] for education, guidance, and support. Dr. Zimmer may consider a referral to a , too.   7. Her care needs may progress beyond what family can safely provide at home. They should begin discussing and exploring options for alternative placements in care facilities, in order to have a plan in place if needed.   8. During the interview today, Ms. Harman was not able to demonstrate sufficient knowledge and cognitive capacity for independent management of medical decision making. She should not be left out of decisions by any means, but her family needs to be closely involved in her care and handling all decisions of consequence.  9. She may be better able to tolerate a cognitive evaluation via telemedicine, where she can remain at home rather than coming to the clinic and provoking her longstanding discomfort with office visits. If additional psychometric assessment by video visit is desired, I would ask Dr. Zimmer to reach out to me and I would be happy to set it up.     Chivo Win, PhD, LP, ABPP-CN  Board Certified in Clinical Neuropsychology  Licensed Psychologist [id number]      Time spent: 49  minutes neurobehavioral status exam including records review, interview, and clinical assessment by a licensed and board-certified neuropsychologist (CPT 96219, [id number]). 163 minutes neuropsychological evaluation by a licensed and board-certified neuropsychologist, including direct patient management, integration of patient data, interpretation of standardized test results and clinical data, clinical decision-making, treatment planning, report preparation, and interactive feedback to the patient and family (CPT 19898, [id number]). 63 minutes of psychological and neuropsychological test administration, scoring, and direct patient management time by technician (CPT 44415, [id number]). Diagnoses: F03.91, F39, F41.9

## 2021-05-21 NOTE — NURSING NOTE
Pt was seen for neuropsychological evaluation at the request of Dr. Alva Zimmer for the purposes of diagnostic clarification and treatment planning. 63 minutes of test administration and scoring were provided by this writer. Please see Dr. Chivo Win's report for a full interpretation of the findings.    Les Zarco  Psychometrist

## 2021-05-21 NOTE — LETTER
5/21/2021       RE: Crista Harman  [address]  [facility] [address]     Dear Colleague,    Thank you for referring your patient, Crista Harman, to the [facility]UR[facility]SYCHOLOG[facility] [place] at [facility]. Please see a copy of my visit note below.    Adult Neuropsychology Clinic  [facility]      NEUROPSYCHOLOGICAL EVALUATION    RELEVANT HISTORY AND REASON FOR REFERRAL    This is a report of neuropsychological consultation regarding Crista Harman, an 86-year-old woman who presents with concerns about cognitive decline and worsening mood and anxiety. Medical history includes myocardial infarction perhaps 15-20 years ago, coronary artery disease, hypertension, hypercholesterolemia, heart murmur, sinus bradycardia, and peripheral vascular disease. Per information gathered today, there is likely longstanding generalized anxiety. She saw Dr. Alva Zimmer in the [facility] system for a family medicine visit in March, accompanied by her daughter-in-law. The family had more concerns about cognitive decline than Ms. Harman did. She was resistant to the idea of further workups, but the family wanted to pursue it. Mood and anxiety problems were described as reactions to being confronted with memory lapses. Scores were elevated on the PHQ-9 (8) and LACHO-7 (10), and she endorsed suicidal ideation on the PHQ-9. Dr. Zimmer started her on escitalopram and placed the referral for neuropsychological assessment of brain functioning.     [facility] s [facility]. I was connected to Ms. Harman remotely via video conference to conduct the clinical interview. I was in telephone contact with my psychometrist to oversee the testing session, which was done in person. Another neuropsychologist from our clinic was on-site to provide any face-to-face emergency support if needed.     Ms. Harman was accompanied to today's evaluation  by her son, Anthony. She told me she just notices nondescript forgetfulness at times, for less than a year. It has seemed stable or not clearly progressive to her. Her son said he suspects changes began on the order of 6 or 7 years ago, in concert with increased worry about memory problems after a friend with cognitive decline . His view is that there has been a slow progression and apparent acceleration over the last year. About 3 years ago, a close friend of hers called him to raise concerns about her memory. Over the last year, things have become undeniably problematic. Anthony took over managing her finances about a year ago. She is no longer driving, and she said today that driving feels like an unsafe activity for her. She relies on her sons and daughter-in-law for transportation.     On 3/30/2021, she was in the hospital for a coronary angiogram. She was anesthetized for the procedure and then had a period of delirium that lasted several days. She was agitated, combative, confused, and delusional. She was put into four soft limb restraints at one point. She has no memory of her time in the hospital. I see associated records saying she was treated with olanzapine. The records describe a negative workup including head CT, brain MRI, EEG, UA, and ammonia. The brain MRI showed moderate cerebral volume loss and moderate burden of small vessel ischemic changes. Cognitive screening with the SLUMS was highly abnormal, with a score of 8/30 and losing points in orientation, simple mathematics, naming, repetition, immediate recall, delayed recall, and clock drawing. Assessment with the Short Blessed Test was also abnormal (total weighted score 21), and the OT provider felt she needed 24/7 supervision for safety and ADLs.     Ms. Harman had been living alone, but after the delirium episode, she moved in with her other son and daughter-in-law. Her current medication list includes escitalopram, quetiapine, melatonin, 81  mg aspirin, clopidogrel, nitroglycerin, hydrochlorothiazide, atorvastatin, isosorbide mononitrate, vitamin A, vitamin D3, and multivitamin. Ms. Harman cannot tell me what any of her daily medications are today. Her daughter-in-law is now managing her medications. Per notes from Dr. Zimmer, her daughter-in-law has also established shrestha of .    There are no neuroimaging studies in the records available to me. She and her son report no history of stroke, seizure, TBI, migraine, motoric dyscontrol/parkinsonism, neurologic infections, or CNS tumors. She reports no changes to her senses of smell, taste, or hearing. She had a detached retina about 1 or 2 years ago that was repaired. She might be developing cataracts. She denies any episodic abnormalities with her vision.    Aside from her period of post-anesthesia delirium, there have been no indications of potential hallucinations or delusional thought processes.    Per records from [facility], quetiapine was added to her medication regimen in April for treatment of sundowning patterns. When asked about evening medications, only melatonin is mentioned today. Ms. Harman feels like her sleep is okay. She says she takes melatonin just every once in a while. There are no indications of REM sleep behaviors.    She reports rare alcohol use and no history of alcohol problems. She quit smoking after her myocardial infarction, somewhere around 15-20 years ago. She reports no use of illicit drugs.    She has been eating less. Her son mentioned having to encourage her to eat. She has lost around 20 pounds over the last year. She feels like her energy levels are okay and reports no need for naps. She does not have problems with chronic pain. She has not had a COVID-19 infection, and she has completed her vaccinations. Recent rapid plasma reagin, folate, B12, and TSH labs have been unremarkable.     In the medical records available to me, there are descriptions of  longstanding emotional discomfort at clinical visits, such as notes that mention  white coat hypertension.  Ms. Harman and her son describe her as an individual who is constantly worried. This has been the case for most of her life. She worries about anything and everything. The descriptions are indicative of generalized anxiety disorder. She says she has had no clinical attention to mental health prior to starting escitalopram this year. Her son says she is still taking escitalopram. There is no history of psychiatric hospitalizations. During the initial interview, Ms. Harman denied any history of suicidal ideation. The testing session was cut short today because she was emotionally unable to tolerate it, and she made an indirect remark about self-harm to the psychometrist. In talking with her about it, she described feeling like the tests should have been easy for her and it was upsetting when answers could not be easily produced. She reported no suicidal intent. She told me she would feel safe at home. In talking with her son about her reaction to the testing session, he said that she had been making similar statements in recent months. There is no history of developing plans or taking actions to harm herself. Her son said the family essentially never leaves her alone anymore, because of the cognitive decline. She did not appear to be in imminent danger. I encouraged Ms. Harman and her son to have a low threshold for contacting me at this clinic, contacting the [facility] or [facility] clinical systems, or calling 911 if anything seems to escalate.    Regarding remote history, she reported no early life medical complications or developmental abnormalities. She reported no history of academic delays. She said she always enjoyed school and had a good time with it. She graduated from high school. Her career was spent in secretarial positions. She estimated that she retired about 20 years ago. Her son  felt it was more like 12 or 13 years ago. His recollection is that she retired when her  had a decline in his health and needed to go into nursing care. Her  passed away close to 10 years ago. She has her 2 sons.    Reported family history includes a diagnosis of Alzheimer's disease for an uncle. There is no other known history of neurologic syndromes.    BEHAVIORAL OBSERVATIONS    Ms. Harman was polite and passively cooperative during the interview. There was apparent mild discomfort at answering questions, with a sense of downplaying most concerns and not wanting to go into things too deeply. However, she demonstrated fair insight into the referral question, knowing that today s evaluation involved memory testing and acknowledging some changes in these regards. Comprehension of language was generally appropriate. In open conversation, there seemed to be at least mild difficulties with dysnomia, having to provide descriptions of things rather than the specific names of things. Fund of knowledge was lower than expected, and her son often provided prompts and reminders to boost her recollection of both recent and remote events. Her speech was not dysfluent, dysarthric, or dysporosodic. Mood was neutral at first then tense and dysphoric later in the visit. Affective display was mood-congruent. There were no indications of grossly disorganized thinking or delusional processes.     At the start of the testing session, Ms. Harman was vocal about not wanting to be at the appointment and being upset that her children wanted her to go through with it. As noted above, she had a negative emotional reaction to the testing session. The initiation of memory tests provoked tears, and she became too upset to continue. My psychometrist asked if he could bring her a glass of water or anything, and she suggested that he bring her a gun. I was contacted immediately and then reconnected to the video system to talk with Ms.  Kimani about her reaction to the testing and to assess her safety. We agreed that it was in her best interest to defer the evaluation. We brought her son back into the room to have further discussions about her emotional state, the immediate plan for personal safety, and the longer term plan for continued clinical care of her presenting concerns.    Very limited data were gathered. The neuropsychological evaluation should be seen as incomplete from a psychometrics standpoint.     MEASURES ADMINISTERED    The following measures were administered by a trained psychometrist, under my supervision:    Orientation: Time, Place, Basic Personal Information, Recent [place] Presidents; Wide Range Achievement Test 4: Word Reading; Clock Drawing; Brief Visuospatial Memory Test, Revised.     RESULTS AND INTERPRETATION    Orientation was below normal expectations for time, being 5 days off for the date. She was unable to demonstrate any orientation to place, with no guesses for the city we were in or our specific location. Orientation to basic personal information was normal. Orientation to basic cultural information was abnormal, being unable to name the current [place] president or any other presidents from the last 40 years. Performance on a reading and pronunciation test that is validated for estimating premorbid intelligence was in the average range. Clock drawing was abnormal, being unable to set hands on the clock to represent a specified time. A measure of immediate memory for simple geometric designs was attempted, but she was not able to provide any responses over several trials. The testing session had to be discontinued after that, for the negative emotional reaction described above.     IMPRESSIONS     The neuropsychological results are abnormal, but the evaluation is incomplete because Ms. Harman had a negative emotional reaction to cognitive challenge and could not tolerate more than about 20 minutes of testing. She was  evaluated with the SLUMS during her delirium about 6 weeks ago. As she was delirious, the SLUMS score of 8/30 at the time likely underestimates her overall state, but clinical observation and the few measures we were able to obtain today indicate her functioning is indeed well below normal expectations. 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 She has troubles with multiple areas of cognitive functioning, and she is no longer able to manage independent living.     She has significant issues with low mood and heightened anxiety that need continued clinical attention, but I do not harbor any suspicion that this is a case of so-called  pseudodementia  of psychogenic origin. She is having understandable reactions to being confronted with the loss of cognitive abilities. Her growing trouble controlling emotions is best seen as a consequence of changing neurologic integrity.     The paucity of cognitive data precludes definitive narrowing of likely etiologies. The recent imaging studies indicate a moderate burden of microangiopathic change, and such cerebrovascular issues likely contribute to her dementia. However, the clinical picture is one of slowly progressive changes over the last several years, with memory impairment, dysnomia, and descriptions of sundowning patterns. This all suggests Alzheimer s disease. 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 I do not see clinical features of Lewy body disease or frontotemporal syndromes.     RECOMMENDATIONS    1. I encouraged Ms. Harman  son to pursue an evaluation with a neurologist, for additional physical assessments and laboratory studies to help narrow the differential.   2. It would be reasonable to talk to a neurologist or Dr. Zimmer about treatments like donepezil, memantine, or rivastigmine.   3. She should  follow with a psychiatrist to keep looking for optimal responses to mood and anxiety medications.   4. An environment of safety is paramount. She expresses suicidal ideation but does not appear to be at imminent risk for self-harm. For a mix of cognitive and emotional reasons, I agree with the recent OT assessment that she needs 24/7 oversight for safety. In talking with Ms. Harman and her son after her negative response to testing and the indirect remark about self-harm, she did not appear to be in imminent danger. I encouraged Ms. Harman and her son to have a low threshold for contacting me at this clinic, contacting the [facility] or [facility] systems, or calling 911 if her mood worsens or anything seems to escalate.  5. She has been losing weight and not eating in a normal fashion. Family should be monitoring her nutritional intake. They should talk to Dr. Zimmer about appropriate supplemental measures (e.g., Boost, Ensure, or similar products).   6. I am glad that she is living with family right now. Respite care provisions will be essential. I would encourage family to reach out to the Alzheimer s [employer] for education, guidance, and support. Dr. Zimmer may consider a referral to a , too.   7. Her care needs may progress beyond what family can safely provide at home. They should begin discussing and exploring options for alternative placements in care facilities, in order to have a plan in place if needed.   8. During the interview today, Ms. Harman was not able to demonstrate sufficient knowledge and cognitive capacity for independent management of medical decision making. She should not be left out of decisions by any means, but her family needs to be closely involved in her care and handling all decisions of consequence.  9. She may be better able to tolerate a cognitive evaluation via telemedicine, where she can remain at home rather than coming to the clinic and  provoking her longstanding discomfort with office visits. If additional psychometric assessment by video visit is desired, I would ask Dr. Zimmer to reach out to me and I would be happy to set it up.     Chivo Win, PhD, LP, ABPP-CN  Board Certified in Clinical Neuropsychology  Licensed Psychologist [id number]      Time spent: 49 minutes neurobehavioral status exam including records review, interview, and clinical assessment by a licensed and board-certified neuropsychologist (CPT 00414, 23352). 163 minutes neuropsychological evaluation by a licensed and board-certified neuropsychologist, including direct patient management, integration of patient data, interpretation of standardized test results and clinical data, clinical decision-making, treatment planning, report preparation, and interactive feedback to the patient and family (CPT 68753, 67020). 63 minutes of psychological and neuropsychological test administration, scoring, and direct patient management time by technician (CPT 89362, 49790). Diagnoses: F03.91, F39, F41.9

## 2021-05-21 NOTE — PROGRESS NOTES
NAME  Crista Harman    MRN  [id number]      37     AGE  86     SEX  Female     HANDEDNESS Left     EDUCATION 12     VILLA  21     PROVIDER  ANASTACIO     TECH       STATION  Outpatient            ORIENTATION      Time  -5     Personal Info. 0     Place      Presidents  0            WRAT-4         SS %ile GE   Word Reading 92 30 7.5          CLOCK DRAWING      Command ABNORMAL             BVMTR      No scorable responses Trials 1-3     Delayed recall/recognition not administered

## 2021-05-24 ENCOUNTER — COMMUNICATION - HEALTHEAST (OUTPATIENT)
Dept: FAMILY MEDICINE | Facility: CLINIC | Age: 86
End: 2021-05-24

## 2021-05-27 VITALS — SYSTOLIC BLOOD PRESSURE: 124 MMHG | HEART RATE: 72 BPM | DIASTOLIC BLOOD PRESSURE: 68 MMHG

## 2021-05-27 ASSESSMENT — PATIENT HEALTH QUESTIONNAIRE - PHQ9
SUM OF ALL RESPONSES TO PHQ QUESTIONS 1-9: 8
SUM OF ALL RESPONSES TO PHQ QUESTIONS 1-9: 1

## 2021-05-28 ASSESSMENT — ANXIETY QUESTIONNAIRES: GAD7 TOTAL SCORE: 10

## 2021-05-28 NOTE — TELEPHONE ENCOUNTER
Please call the patient to ask her what strength medication she has been taking and if she has been taking half or whole tablet to clarify.  Depending on what she says let me know and we will update the prescription for the pharmacy and then I would like you to let the pharmacist know how she should take it.  I have not seen her for over a year and at that time she was taking a full tablet daily to my knowledge of 40 mg.  Then she called in March and questioned why the prescription was changed to half tablet daily.  From what I can tell I never decreased her dose and that it looks like the refill nurses put it in as 0.5 tablets daily.  If that was not instructed to her or started by any of the providers then that should be put into [facility] but I tried to look through and there was never any recommendation for her to do so so likely was an error.  Dr. De Leon called in the correct prescription in March of a full tablet daily of 40 mg so now I am confused as to why this is still an ongoing issue

## 2021-05-28 NOTE — TELEPHONE ENCOUNTER
See telephone encounter from 03/12/19. Patient switched to Simvastatin 20 mg daily. Which dose is patient suppose to be taking?

## 2021-05-28 NOTE — TELEPHONE ENCOUNTER
Medication Question or Clarification  Who is calling: Pharmacy:   [facility]  What medication are you calling about? (include dose and sig)    Sig - Route: Take 1 tablet (40 mg total) by mouth at bedtime. - Oral    Sent to pharmacy as: simvastatin (ZOCOR) 40 MG tablet        Who prescribed the medication?: Dr. Ryann Hodge   What is your question/concern?:  Needs to clarify directions for medication, patient states she is taking 1/2 tablet at bedtime, directions are written as 1 tablet at bedtime  Pharmacy: [facility], [address] to leave a detailed message?: Yes  Site CMT - Please call the pharmacy to obtain any additional needed information.

## 2021-05-28 NOTE — TELEPHONE ENCOUNTER
I called patient and verified she has always taken 40mg whole tablet of simvastatin.  She does not know why it was ever changed.      The pharmacy needed clarification, as they received two prescriptions at the pharmacy in March one for a whole tablet and one for a half tablet.  They cannot see what time they come in, so cannot determine which one was correct.     I also completed a [employer] event reporting.

## 2021-05-29 ENCOUNTER — RECORDS - HEALTHEAST (OUTPATIENT)
Dept: ADMINISTRATIVE | Facility: CLINIC | Age: 86
End: 2021-05-29

## 2021-05-30 VITALS — WEIGHT: 135 LBS | BODY MASS INDEX: 24.3 KG/M2

## 2021-05-30 NOTE — PATIENT INSTRUCTIONS - HE
Take your medications as before.    Usually we hold BP medication that is Hydrochlorothiazide on morning of surgery BUT your surgery is later in day so you can take it as usual.    Follow your surgeon's direction on when to stop eating and drinking prior to surgery.    Your surgeon will be managing your pain after your surgery.    Remove all jewelry and metal piercings before your surgery.   Remove nail polish from fingers before surgery.

## 2021-05-30 NOTE — TELEPHONE ENCOUNTER
New Appointment Needed  What is the reason for the visit:    Pre-Op Appt Request  When is the surgery? :  6/26  Where is the surgery?:   [facility]  Who is the surgeon? :  Dr. Diego Harman  What type of surgery is being done?: repair a detached retina  Provider Preference: Any available  How soon do you need to be seen?: today  Waitlist offered?: No  Okay to leave a detailed message:  Yes      Please advise. Patient needs to be seen on 6/25.

## 2021-05-30 NOTE — TELEPHONE ENCOUNTER
----- Message from Alva Zimmer MD sent at 6/27/2019  8:04 AM CDT -----  Please inform patient that her labs are normal/stable.  Her calcium is minimally elevated.  Sometimes it can be a false value.  However, this should be rechecked with any future lab draws.  Alva Zimmer MD  6/27/2019

## 2021-05-30 NOTE — PROGRESS NOTES
Preoperative Exam    Scheduled Procedure: Right eye retina repair  Surgery Date:  6/26/2019  Surgery Location: [facility]    Surgeon:  Dr. Harman    Assessment/Plan:     1. Encounter for preoperative examination for general surgical procedure  Basic Metabolic Panel   2. History of myocardial infarction     3. Benign essential hypertension     4. Pure hypercholesterolemia       Patient has not had a tetanus for some time.  Informs me that she will get it during her annual physical visit  I have asked her to follow-up for an annual visit      Surgical Procedure Risk: Low (reported cardiac risk generally < 1%)  Have you had prior anesthesia?: Yes  Have you or any family members had a previous anesthesia reaction:  No  Do you or any family members have a history of a clotting or bleeding disorder?: No  Cardiac Risk Assessment: no increased risk for major cardiac complications    Patient approved for surgery with general or local anesthesia.    Please Note:  H/o MI in remote past .Able to go for walks and able to do a couple of flight of steps without any shortness of breath.  Patient also has partial dentures.    Functional Status: Independent  Patient plans to recover at home with family.     Subjective:      Chief Complaint   Patient presents with     Pre-op Exam     DOS 6/26/2019, Right eye retina repair @ [facility], with Dr. Diego Harman     Patient being added to schedule today for a preop clearance for surgery that is dated for tomorrow    Crista Harman is a 84 y.o. female who presents for a preoperative consultation.    No acute concern.    She indicates that she is feeling well and   denies any symptoms referable to her elevated blood pressure.   Specifically denies chest pain, palpitations, dyspnea, orthopnea,   PND or peripheral edema    She is able to go up a flight of stairs and goes for walk without getting short of breath and remains active.    She has a history of MI with stent  placement in 2009 and femoral stents placed in 2011 but has remained asymptomatic since then.      All other systems reviewed and are negative, other than those listed in the HPI.    Pertinent History  Do you have difficulty breathing or chest pain after walking up a flight of stairs: No  History of obstructive sleep apnea: No  Steroid use in the last 6 months: No  Frequent Aspirin/NSAID use: Yes: DAILY  Prior Blood Transfusion: No  Prior Blood Transfusion Reaction: No  If for some reason prior to, during or after the procedure, if it is medically indicated, would you be willing to have a blood transfusion?:  There is no transfusion refusal.    Current Outpatient Medications   Medication Sig Dispense Refill     aspirin 81 MG EC tablet Take 1 tablet (81 mg total) by mouth daily. 150 tablet 2     hydroCHLOROthiazide (HYDRODIURIL) 25 MG tablet Take 1 tablet (25 mg total) by mouth daily. 90 tablet 1     hydroCHLOROthiazide (HYDRODIURIL) 25 MG tablet TAKE 1 TABLET BY MOUTH DAILY 90 tablet 0     MULTIVITAMIN ORAL Take 1 capsule by mouth daily.       omega-3 fatty acids-fish oil (OMEGA 3 FISH OIL) 684-1,200 mg CpDR Take 1 capsule by mouth daily.       simvastatin (ZOCOR) 40 MG tablet Take 1 tablet (40 mg total) by mouth at bedtime. 90 tablet 1     VITAMIN A/VITAMIN D3 (NATURAL VITAMIN D ORAL) Take 1,000 Units by mouth daily.        No current facility-administered medications for this visit.         Allergies   Allergen Reactions     Nickel        Patient Active Problem List   Diagnosis     hx C diff 2015     Atherosclerosis of coronary artery     Benign essential hypertension     History of myocardial infarction     Pure hypercholesterolemia     Peripheral vascular disease- hx of stents     Heart murmur     Persons encountering health services in other specified circumstances     White coat syndrome with hypertension       Past Medical History:   Diagnosis Date     CAD (coronary artery disease)      History of heart  attack      Hyperlipidemia      Hypertension      Peripheral vascular disease (H)        Past Surgical History:   Procedure Laterality Date     APPENDECTOMY       BACK SURGERY       CORONARY STENT PLACEMENT      2      OOPHORECTOMY       VASCULAR SURGERY      stents legs        Social History     Socioeconomic History     Marital status:      Spouse name: Not on file     Number of children: Not on file     Years of education: Not on file     Highest education level: Not on file   Occupational History     Not on file   Social Needs     Financial resource strain: Not on file     Food insecurity:     Worry: Not on file     Inability: Not on file     Transportation needs:     Medical: Not on file     Non-medical: Not on file   Tobacco Use     Smoking status: Former Smoker     Last attempt to quit: 2007     Years since quittin.4     Smokeless tobacco: Never Used   Substance and Sexual Activity     Alcohol use: Yes     Comment: occasional beer or bloody randall     Drug use: No     Sexual activity: Not on file   Lifestyle     Physical activity:     Days per week: Not on file     Minutes per session: Not on file     Stress: Not on file   Relationships     Social connections:     Talks on phone: Not on file     Gets together: Not on file     Attends Tenriism service: Not on file     Active member of club or organization: Not on file     Attends meetings of clubs or organizations: Not on file     Relationship status: Not on file     Intimate partner violence:     Fear of current or ex partner: Not on file     Emotionally abused: Not on file     Physically abused: Not on file     Forced sexual activity: Not on file   Other Topics Concern     Not on file   Social History Narrative    She lives by herself with her dog.  She has neighbors around.  Her son and family live 10 minutes away.        Alva Zimmer MD  2019           Patient Care Team:  Ryann Hodge DO as PCP - General (Family  "Medicine)          Objective:     Vitals:    06/25/19 1427   BP: 136/72   Pulse: 74   SpO2: 95%   Weight: 120 lb (54.4 kg)   Height: 5' 3.5\" (1.613 m)         Physical Exam:  General Appearance: healthy, alert, oriented and no distress  HEENT Exam: Oropharynx clear without lesion or exudate.  Has partial denture.  Neck:  supple, no adenopathy, thyroid normal  Heart: Normal heart sounds, S1 and S2, No murmurs.  Lungs: Normal breath sounds, Clear to auscultation bilateral  Skin exam: Warm and well-perfused  Neurological exam: gait normal, alert and oriented X 3  Hem/Lymph/Immuno exam: negative findings:  no cervical nodes, no supraclavicular nodes,         Patient Instructions     Take your medications as before.    Usually we hold BP medication that is Hydrochlorothiazide on morning of surgery BUT your surgery is later in day so you can take it as usual.    Follow your surgeon's direction on when to stop eating and drinking prior to surgery.    Your surgeon will be managing your pain after your surgery.    Remove all jewelry and metal piercings before your surgery.   Remove nail polish from fingers before surgery.      EKG:  Not needed    Labs:  Labs pending at this time.  Results will be reviewed when available.    Immunization History   Administered Date(s) Administered     Influenza high dose, seasonal 09/21/2012, 08/25/2017     Influenza, inj, historic,unspecified 10/01/2009, 09/28/2010, 09/20/2011, 08/18/2016     Pneumo Conj 13-V (2010&after) 03/23/2017     Pneumo Polysac 23-V 01/22/2008           Electronically signed by Alva Zimmer MD 06/25/19 2:30 PM    "

## 2021-05-30 NOTE — TELEPHONE ENCOUNTER
Spoke to patient's daughter in-law Cynthia and told her Dr. Zimmer is willing to double book and see patient today before 2:30pm for pre-op physical.  Cynthia voiced understanding and is bringing patient to clinic right now.

## 2021-06-01 VITALS — WEIGHT: 127.8 LBS | BODY MASS INDEX: 23 KG/M2

## 2021-06-02 ENCOUNTER — COMMUNICATION - HEALTHEAST (OUTPATIENT)
Dept: FAMILY MEDICINE | Facility: CLINIC | Age: 86
End: 2021-06-02

## 2021-06-02 VITALS — BODY MASS INDEX: 22.57 KG/M2 | WEIGHT: 125.38 LBS

## 2021-06-03 ENCOUNTER — HOSPITAL ENCOUNTER (OUTPATIENT)
Dept: CARDIOLOGY | Facility: HOSPITAL | Age: 86
Discharge: HOME | End: 2021-06-03
Attending: INTERNAL MEDICINE
Payer: COMMERCIAL

## 2021-06-03 ENCOUNTER — RECORDS - HEALTHEAST (OUTPATIENT)
Dept: ADMINISTRATIVE | Facility: CLINIC | Age: 86
End: 2021-06-03

## 2021-06-03 VITALS — WEIGHT: 120 LBS | HEIGHT: 64 IN | BODY MASS INDEX: 20.49 KG/M2

## 2021-06-03 DIAGNOSIS — R07.9 ACUTE CHEST PAIN: ICD-10-CM

## 2021-06-04 NOTE — TELEPHONE ENCOUNTER
Refill Approved    Rx renewed per Medication Renewal Policy. Medication was last renewed on 3/15/19  OV 6/25/19.    Kerri Seth, [facility] Connection Triage/Med Refill 12/14/2019     Requested Prescriptions   Pending Prescriptions Disp Refills     hydroCHLOROthiazide (HYDRODIURIL) 25 MG tablet 90 tablet 0     Sig: Take 1 tablet (25 mg total) by mouth daily.       Diuretics/Combination Diuretics Refill Protocol  Passed - 12/14/2019 12:16 AM        Passed - Visit with PCP or prescribing provider visit in past 12 months     Last office visit with prescriber/PCP: 4/2/2018 Ryann Hodge DO OR same dept: Visit date not found OR same specialty: 9/18/2018 Gina Abel, [place]  Last physical: Visit date not found Last MTM visit: Visit date not found   Next visit within 3 mo: Visit date not found  Next physical within 3 mo: Visit date not found  Prescriber OR PCP: Ryann Hodge DO  Last diagnosis associated with med order: 1. Benign essential hypertension  - hydroCHLOROthiazide (HYDRODIURIL) 25 MG tablet; Take 1 tablet (25 mg total) by mouth daily.  Dispense: 90 tablet; Refill: 0    If protocol passes may refill for 12 months if within 3 months of last provider visit (or a total of 15 months).             Passed - Serum Potassium in past 12 months      Lab Results   Component Value Date    Potassium 4.0 06/25/2019             Passed - Serum Sodium in past 12 months      Lab Results   Component Value Date    Sodium 141 06/25/2019             Passed - Blood pressure on file in past 12 months     BP Readings from Last 1 Encounters:   06/25/19 136/72             Passed - Serum Creatinine in past 12 months      Creatinine   Date Value Ref Range Status   06/25/2019 0.99 0.60 - 1.10 mg/dL Final

## 2021-06-04 NOTE — TELEPHONE ENCOUNTER
Refill Approved    Rx renewed per Medication Renewal Policy. Medication was last renewed on 3/12/19.    Xi Malcolm, [facility] Connection Triage/Med Refill 12/15/2019     Requested Prescriptions   Pending Prescriptions Disp Refills     simvastatin (ZOCOR) 40 MG tablet [Pharmacy Med Name: SIMVASTATIN 40MG TABLETS] 90 tablet 0     Sig: TAKE 1 TABLET(40 MG) BY MOUTH AT BEDTIME       Statins Refill Protocol (Hmg CoA Reductase Inhibitors) Passed - 12/13/2019  9:48 AM        Passed - PCP or prescribing provider visit in past 12 months      Last office visit with prescriber/PCP: Visit date not found OR same dept: Visit date not found OR same specialty: 9/18/2018 Gina Abel, [place]  Last physical: Visit date not found Last MTM visit: Visit date not found   Next visit within 3 mo: Visit date not found  Next physical within 3 mo: Visit date not found  Prescriber OR PCP: Pilo De Leon MD  Last diagnosis associated with med order: 1. Pure hypercholesterolemia  - simvastatin (ZOCOR) 40 MG tablet [Pharmacy Med Name: SIMVASTATIN 40MG TABLETS]; TAKE 1 TABLET(40 MG) BY MOUTH AT BEDTIME  Dispense: 90 tablet; Refill: 0    If protocol passes may refill for 12 months if within 3 months of last provider visit (or a total of 15 months).

## 2021-06-05 VITALS
RESPIRATION RATE: 24 BRPM | OXYGEN SATURATION: 98 % | HEART RATE: 47 BPM | DIASTOLIC BLOOD PRESSURE: 64 MMHG | TEMPERATURE: 96.8 F | BODY MASS INDEX: 18.16 KG/M2 | WEIGHT: 105.8 LBS | SYSTOLIC BLOOD PRESSURE: 130 MMHG

## 2021-06-05 VITALS
BODY MASS INDEX: 17.52 KG/M2 | HEIGHT: 64 IN | SYSTOLIC BLOOD PRESSURE: 138 MMHG | HEART RATE: 70 BPM | DIASTOLIC BLOOD PRESSURE: 68 MMHG | WEIGHT: 102.6 LBS

## 2021-06-05 VITALS
WEIGHT: 105.4 LBS | HEART RATE: 68 BPM | HEIGHT: 64 IN | BODY MASS INDEX: 17.99 KG/M2 | SYSTOLIC BLOOD PRESSURE: 128 MMHG | DIASTOLIC BLOOD PRESSURE: 59 MMHG | RESPIRATION RATE: 16 BRPM

## 2021-06-05 VITALS — HEIGHT: 64 IN | BODY MASS INDEX: 17.67 KG/M2 | WEIGHT: 103.5 LBS

## 2021-06-05 VITALS
HEIGHT: 64 IN | BODY MASS INDEX: 18.1 KG/M2 | WEIGHT: 106 LBS | HEART RATE: 47 BPM | SYSTOLIC BLOOD PRESSURE: 130 MMHG | OXYGEN SATURATION: 98 % | RESPIRATION RATE: 16 BRPM | DIASTOLIC BLOOD PRESSURE: 48 MMHG

## 2021-06-07 ENCOUNTER — COMMUNICATION - HEALTHEAST (OUTPATIENT)
Dept: SCHEDULING | Facility: CLINIC | Age: 86
End: 2021-06-07

## 2021-06-08 ENCOUNTER — RECORDS - HEALTHEAST (OUTPATIENT)
Dept: ADMINISTRATIVE | Facility: OTHER | Age: 86
End: 2021-06-08

## 2021-06-08 NOTE — TELEPHONE ENCOUNTER
I spoke with the patient and scheduled her a telephone med check appt for 06/22/20 at 8AM with Dr Hodge.

## 2021-06-08 NOTE — TELEPHONE ENCOUNTER
Refill Approved    Rx renewed per Medication Renewal Policy. Medication was last renewed on 3/13/20.    Xi Malcolm, [facility] Triage/Med Refill 6/9/2020     Requested Prescriptions   Pending Prescriptions Disp Refills     hydroCHLOROthiazide (HYDRODIURIL) 25 MG tablet [Pharmacy Med Name: HYDROCHLOROTHIAZIDE 25MG TABLETS] 90 tablet 0     Sig: TAKE 1 TABLET(25 MG) BY MOUTH DAILY       Diuretics/Combination Diuretics Refill Protocol  Passed - 6/5/2020  5:44 PM        Passed - Visit with PCP or prescribing provider visit in past 12 months     Last office visit with prescriber/PCP: 4/2/2018 Ryann Hodge DO OR same dept: Visit date not found OR same specialty: 9/18/2018 Gina Abel, [place]  Last physical: Visit date not found Last MTM visit: Visit date not found   Next visit within 3 mo: Visit date not found  Next physical within 3 mo: Visit date not found  Prescriber OR PCP: Ryann Hodge DO  Last diagnosis associated with med order: 1. Benign essential hypertension  - hydroCHLOROthiazide (HYDRODIURIL) 25 MG tablet [Pharmacy Med Name: HYDROCHLOROTHIAZIDE 25MG TABLETS]; TAKE 1 TABLET(25 MG) BY MOUTH DAILY  Dispense: 90 tablet; Refill: 0    If protocol passes may refill for 12 months if within 3 months of last provider visit (or a total of 15 months).             Passed - Serum Potassium in past 12 months      Lab Results   Component Value Date    Potassium 4.0 06/25/2019             Passed - Serum Sodium in past 12 months      Lab Results   Component Value Date    Sodium 141 06/25/2019             Passed - Blood pressure on file in past 12 months     BP Readings from Last 1 Encounters:   06/25/19 136/72             Passed - Serum Creatinine in past 12 months      Creatinine   Date Value Ref Range Status   06/25/2019 0.99 0.60 - 1.10 mg/dL Final

## 2021-06-08 NOTE — TELEPHONE ENCOUNTER
Refill Approved    Rx renewed per Medication Renewal Policy. Medication was last renewed on 12/15/19.    Xi Malcolm, [facility] Connection Triage/Med Refill 6/9/2020     Requested Prescriptions   Pending Prescriptions Disp Refills     simvastatin (ZOCOR) 40 MG tablet [Pharmacy Med Name: SIMVASTATIN 40MG TABLETS] 90 tablet 1     Sig: TAKE 1 TABLET(40 MG) BY MOUTH AT BEDTIME       Statins Refill Protocol (Hmg CoA Reductase Inhibitors) Passed - 6/5/2020  5:44 PM        Passed - PCP or prescribing provider visit in past 12 months      Last office visit with prescriber/PCP: Visit date not found OR same dept: Visit date not found OR same specialty: 9/18/2018 Gina Abel, [place]  Last physical: Visit date not found Last MTM visit: Visit date not found   Next visit within 3 mo: Visit date not found  Next physical within 3 mo: Visit date not found  Prescriber OR PCP: Pilo De Leon MD  Last diagnosis associated with med order: 1. Pure hypercholesterolemia  - simvastatin (ZOCOR) 40 MG tablet [Pharmacy Med Name: SIMVASTATIN 40MG TABLETS]; TAKE 1 TABLET(40 MG) BY MOUTH AT BEDTIME  Dispense: 90 tablet; Refill: 1    If protocol passes may refill for 12 months if within 3 months of last provider visit (or a total of 15 months).

## 2021-06-08 NOTE — TELEPHONE ENCOUNTER
Refill Request  Did you contact pharmacy: Patient is upset.  She states the pharmacy would not give her themedications.  She states all she has to do is every 90 renew medication.  She was informed to make a virtual visit but she does not want to. Please advise.    Medication name:   Requested Prescriptions     Pending Prescriptions Disp Refills     simvastatin (ZOCOR) 40 MG tablet [Pharmacy Med Name: SIMVASTATIN 40MG TABLETS] 45 tablet 0     Sig: TAKE 1/2 TABLET BY MOUTH EVERY NIGHT AT BEDTIME     hydroCHLOROthiazide (HYDRODIURIL) 25 MG tablet [Pharmacy Med Name: HYDROCHLOROTHIAZIDE 25MG TABLETS] 90 tablet 0     Sig: TAKE 1 TABLET BY MOUTH DAILY     Who prescribed the medication: Ryann Hodge DO  [facility]ed [facility]  Is patient out of medication: about 5 doses  Patient notified refills processed in 3 business days:  yes  Okay to leave a detailed message: yes

## 2021-06-09 ENCOUNTER — COMMUNICATION - HEALTHEAST (OUTPATIENT)
Dept: CARDIOLOGY | Facility: CLINIC | Age: 86
End: 2021-06-09

## 2021-06-09 ENCOUNTER — RECORDS - HEALTHEAST (OUTPATIENT)
Dept: ADMINISTRATIVE | Facility: OTHER | Age: 86
End: 2021-06-09

## 2021-06-09 ENCOUNTER — RECORDS - HEALTHEAST (OUTPATIENT)
Dept: CARDIOLOGY | Facility: CLINIC | Age: 86
End: 2021-06-09

## 2021-06-09 ENCOUNTER — RECORDS - HEALTHEAST (OUTPATIENT)
Dept: ADMINISTRATIVE | Facility: CLINIC | Age: 86
End: 2021-06-09

## 2021-06-09 NOTE — PROGRESS NOTES
ASSESSMENT & PLAN:  1. Atherosclerosis of coronary artery  -Stable at this time in terms of no cardiac symptoms.  Continue aspirin therapy but I think she would be fine with going down 81 mg for less GI risk.  Continue statin therapy and repeat lipid panel fasting next office visit.  She can follow-up in 6-12 months for medication check at that time.  She declines an annual physical.  Also declines any other preventative maintenance.  She is due for Pneumovax vaccine which she would like to do at [facility] well as [facility].  Continue adequate blood pressure control.  Refilled meds  - Comprehensive Metabolic Panel    2. Benign essential hypertension  -Blood pressure in good control on hydrochlorothiazide 12.5 mg daily refill medications reassess kidney function  - Comprehensive Metabolic Panel         There are no Patient Instructions on file for this visit.     Orders Placed This Encounter   Procedures     Comprehensive Metabolic Panel     Medications Discontinued During This Encounter   Medication Reason     simvastatin (ZOCOR) 40 MG tablet Reorder     hydroCHLOROthiazide (HYDRODIURIL) 12.5 MG tablet Reorder       No Follow-up on file.     CHIEF COMPLAINT:  Chief Complaint   Patient presents with     Medication Management     No questions or concerns        HISTORY OF PRESENT ILLNESS:  Crista is a 81 y.o. female presenting to the clinic today to follow up on her chronic health conditions for medication refills. Her blood pressure has been looking good at home. She has been tolerating HCTZ 12.5 mg daily and simvastatin 40 mg daily. She would like to return only once per year for check ups, and she would like 3 months worth of her prescriptions at a time. She was seen at [facility] after she was hospitalized, but driving there in the winter was more difficult. She lives about 3 minutes from this clinic. She is taking 325 mg aspirin daily; she was told to increase her aspirin from 81 mg the last time she was hospitalized  for C diff. She has had a heart attack before as well. She has a faint heart murmur.     Health Maintenance: She declines colonoscopies and mammograms. She has many immunizations done at [facility]. She is not sure which vaccines she has had. She notes she will not be coming in for an annual physical. She plays with her great grandson for exercise.    REVIEW OF SYSTEMS:   She denies any exertional chest pain, heart palpitations, or peripheral edema. All other systems are negative.     PFSH:     TOBACCO USE:  History   Smoking Status     Former Smoker     Quit date: 1/22/2007   Smokeless Tobacco     Not on file        VITALS:  Vitals:    03/23/17 0923   BP: 130/62   Pulse: 70   Resp: 16   Temp: 98.1  F (36.7  C)   TempSrc: Oral   Weight: 135 lb (61.2 kg)     Wt Readings from Last 3 Encounters:   03/23/17 135 lb (61.2 kg)   08/01/16 136 lb (61.7 kg)   01/23/15 135 lb (61.2 kg)     Body mass index is 24.3 kg/(m^2).  No LMP recorded. Patient is postmenopausal.       PHYSICAL EXAM:  GENERAL:  Reveals an alert 81 y.o. female in NAD.  Vitals:  Per nursing notes.  CARDIAC:  Regular rate and rhythm with 1/6  Murmurs over aorta , rubs, or gallops. Legs without edema.  LUNGS: Clear.  Respiratory effort normal.  PSYCH:  Mood appropriate, memory intact.       DATA REVIEWED:  ADDITIONAL HISTORY SUMMARIZED (2): None.  DECISION TO OBTAIN EXTRA INFORMATION (1): None.   RADIOLOGY TESTS (1): None.  LABS (1): Reviewed and ordered labs.  MEDICINE TESTS (1): None.  INDEPENDENT REVIEW (2 each): None.     The visit lasted a total of 12 minutes face to face with the patient. Over 50% of the time was spent counseling and educating the patient about her chronic health conditions, medications, and health maintenance.     Nakita MELTON, am scribing for and in the presence of Dr. Hodge.  Dr. Ayesha MELTON, personally performed the services described in this documentation, as scribed by Nakita Benitez in my presence, and it is both accurate and  complete.     MEDICATIONS:  Current Outpatient Prescriptions   Medication Sig Dispense Refill     aspirin 325 MG tablet Take 325 mg by mouth bedtime.        hydroCHLOROthiazide (HYDRODIURIL) 12.5 MG tablet Take 1 tablet (12.5 mg total) by mouth daily. 90 tablet 2     magnesium oxide 250 mg Tab Take 250 mg by mouth daily.       MELATONIN, BULK, MISC Take 3 mg by mouth bedtime.        MELATONIN-VENKAT-HERBAL NO.183 ORAL Take by mouth. Venkat only (melatonin in separate pill)       MULTIVITAMIN ORAL Take 1 capsule by mouth daily.       omega-3 fatty acids-fish oil (OMEGA 3 FISH OIL) 684-1,200 mg CpDR Take 1 capsule by mouth daily.       simvastatin (ZOCOR) 40 MG tablet Take 1 tablet (40 mg total) by mouth bedtime. 90 tablet 2     VITAMIN A/VITAMIN D3 (NATURAL VITAMIN D ORAL) Take 1,000 Units by mouth daily.        No current facility-administered medications for this visit.         Total data points: 1

## 2021-06-09 NOTE — TELEPHONE ENCOUNTER
Who is calling:  Patient   Reason for Call:  Patient is coming in for same day labs at 3:45pm.    Okay to leave a detailed message: No

## 2021-06-09 NOTE — PROGRESS NOTES
Follow Up Blood Pressure Check    Crista Harman is a 85 y.o. female recommended to follow up for blood pressure check by Ryann Hodge DO. Anihypertensive medications and adherence were verified: Yes.     Reason for visit: blood pressure check    Medication change at last visit: none     Today's Vitals:   Vitals:    06/24/20 1529   BP: 124/68   Patient Site: Right Arm   Patient Position: Sitting   Cuff Size: Adult Regular   Pulse: 72           Lowest blood pressure today is 124/68 and they deny signs or symptoms of new onset: severe headache, fatigue, confusion, vision changes, chest pain, pounding in the chest, neck, ears, irregular heartbeat, difficulty breathing and blood in the urine.  Please inform patient of his/her blood pressure today.  If they are asymptomatic, the patient is to continue current medications.  This message will be routed to their provider, and they will be notified if a change in medication is recommended.        Janie Davila    Current Outpatient Medications   Medication Sig Dispense Refill     aspirin 81 MG EC tablet Take 1 tablet (81 mg total) by mouth daily. 150 tablet 2     hydroCHLOROthiazide (HYDRODIURIL) 25 MG tablet TAKE 1 TABLET(25 MG) BY MOUTH DAILY 90 tablet 0     MULTIVITAMIN ORAL Take 1 capsule by mouth daily.       omega-3 fatty acids-fish oil (OMEGA 3 FISH OIL) 684-1,200 mg CpDR Take 1 capsule by mouth daily.       simvastatin (ZOCOR) 40 MG tablet TAKE 1 TABLET(40 MG) BY MOUTH AT BEDTIME 90 tablet 0     VITAMIN A/VITAMIN D3 (NATURAL VITAMIN D ORAL) Take 1,000 Units by mouth daily.        No current facility-administered medications for this visit.

## 2021-06-09 NOTE — TELEPHONE ENCOUNTER
----- Message from Ryann Hodge DO sent at 6/22/2020  8:31 AM CDT -----  Please call patient about scheduling labs sometime in the next month when she gets back from her camping trip as well as CSS for blood pressure check

## 2021-06-09 NOTE — PATIENT INSTRUCTIONS - HE
-Please schedule lab appointment within the next 3 months to check your sodium, potassium, calcium and kidney function  .-Please be sure to have taken your medication that day for blood pressure and the nurse will also check your blood pressure  -Consider the next time you go to the pharmacy having your blood pressure checked and also consider asking them about the tetanus vaccine and the shingles vaccine

## 2021-06-09 NOTE — PROGRESS NOTES
"Crista Harman is a 85 y.o. female who is being evaluated via a billable telephone visit.      The patient has been notified of following:     \"This telephone visit will be conducted via a call between you and your physician/provider. We have found that certain health care needs can be provided without the need for a physical exam.  This service lets us provide the care you need with a short phone conversation.  If a prescription is necessary we can send it directly to your pharmacy.  If lab work is needed we can place an order for that and you can then stop by our lab to have the test done at a later time.    Telephone visits are billed at different rates depending on your insurance coverage. During this emergency period, for some insurers they may be billed the same as an in-person visit.  Please reach out to your insurance provider with any questions.    If during the course of the call the physician/provider feels a telephone visit is not appropriate, you will not be charged for this service.\"    Patient has given verbal consent to a Telephone visit? Yes    What phone number would you like to be contacted at? [phone]    Patient would like to receive their AVS by AVS Preference: Mail a copy.     ASSESSMENT and PLAN:  Crista was seen today for medication management and depression.    Diagnoses and all orders for this visit:    Atherosclerosis of coronary artery of native heart without angina pectoris, unspecified vessel or lesion type    Benign essential hypertension  -     Comprehensive Metabolic Panel; Future    Pure hypercholesterolemia    -Patient here for medication check virtually.  Has a lot of anxiety over coming into the clinic.  She has agreed to come in for blood pressure check with [facility] and for labs but blood pressure might still be high as she does get anxious about coming into the clinic.  Has always been fine when checked at her pharmacy.  Declines any preventative maintenance like mammogram, " bone density scan.  She would consider tetanus shot and would like to do it at her pharmacy.  We did discuss the Shingrix vaccine as well.  Follow-up in approximately 12 months or sooner if any issues.  No anginal symptoms.  I am happy to refill her medications when it comes time for refill request.  She declines lipid  We discussed CODE STATUS.  She does not have a living will but notes that her family knows that they would not do any life-saving intervention but if she were to be found at a grocery store for example she would want CPR      DATA REVIEWED:  Labs ordered     The visit lasted a total of 14 minutes face to face with the patient. Over 50% of the time was spent counseling and educating the patient     CHIEF COMPLAINT:  Chief Complaint   Patient presents with     Medication Management     Depression     Has been feeling a little down lately.        HISTORY OF PRESENT ILLNESS:  Crista Harman is a 85 y.o. female  Here for virtual phone visit  Is a , most of friends - doesn't go and do things like used to. Gets the blues. Doesn't last long.  Has a lot of anxiety. Family calls daily. Seeing family-going camping this weekend with family. No problems caring for the home. Grocery shopping. And drives short distances. No blood pressure at home- drug store has machine could check. Blood pressure has been good last time checked.  No anginal symptoms.  No other systemic sx or joint issues. Can still run. Takes care of a dog.  Doesn't want lipid check.     REVIEW OF SYSTEMS:    Comprehensive review of systems is negative except as noted in the HPI.     PFSH:  Tobacco use and medications reviewed below.     TOBACCO USE:  Social History     Tobacco Use   Smoking Status Former Smoker     Last attempt to quit: 2007     Years since quittin.4   Smokeless Tobacco Never Used       VITALS:  There were no vitals filed for this visit.  Wt Readings from Last 3 Encounters:   19 120 lb (54.4 kg)    09/18/18 125 lb 6 oz (56.9 kg)   04/02/18 127 lb 12.8 oz (58 kg)       PHYSICAL EXAM:  Could not eval over the phone       MEDICATIONS:  Current Outpatient Medications   Medication Sig Dispense Refill     aspirin 81 MG EC tablet Take 1 tablet (81 mg total) by mouth daily. 150 tablet 2     hydroCHLOROthiazide (HYDRODIURIL) 25 MG tablet TAKE 1 TABLET(25 MG) BY MOUTH DAILY 90 tablet 0     MULTIVITAMIN ORAL Take 1 capsule by mouth daily.       omega-3 fatty acids-fish oil (OMEGA 3 FISH OIL) 684-1,200 mg CpDR Take 1 capsule by mouth daily.       simvastatin (ZOCOR) 40 MG tablet TAKE 1 TABLET(40 MG) BY MOUTH AT BEDTIME 90 tablet 0     VITAMIN A/VITAMIN D3 (NATURAL VITAMIN D ORAL) Take 1,000 Units by mouth daily.        No current facility-administered medications for this visit.

## 2021-06-09 NOTE — TELEPHONE ENCOUNTER
Second Attempt: LM for patient to call back to schedule a lab only and nurse only appt for bp check.

## 2021-06-10 ENCOUNTER — OFFICE VISIT - HEALTHEAST (OUTPATIENT)
Dept: CARDIOLOGY | Facility: CLINIC | Age: 86
End: 2021-06-10

## 2021-06-10 DIAGNOSIS — I25.83 CORONARY ARTERY DISEASE DUE TO LIPID RICH PLAQUE: ICD-10-CM

## 2021-06-10 DIAGNOSIS — R07.9 CHEST PAIN, UNSPECIFIED TYPE: ICD-10-CM

## 2021-06-10 DIAGNOSIS — I25.10 CORONARY ARTERY DISEASE DUE TO LIPID RICH PLAQUE: ICD-10-CM

## 2021-06-10 DIAGNOSIS — R07.1 CHEST PAIN ON BREATHING: ICD-10-CM

## 2021-06-10 DIAGNOSIS — E87.6 HYPOKALEMIA: ICD-10-CM

## 2021-06-10 LAB
MAGNESIUM SERPL-MCNC: 2.5 MG/DL (ref 1.8–2.6)
POTASSIUM BLD-SCNC: 4.4 MMOL/L (ref 3.5–5)

## 2021-06-11 ENCOUNTER — AMBULATORY - HEALTHEAST (OUTPATIENT)
Dept: CARDIOLOGY | Facility: CLINIC | Age: 86
End: 2021-06-11

## 2021-06-11 DIAGNOSIS — E87.6 HYPOKALEMIA: ICD-10-CM

## 2021-06-11 NOTE — TELEPHONE ENCOUNTER
Please let patient know I sent both of these.  The previous requests went to Dr. Hodge.  I was unable to delete the pended request for the simvastatin, are you able to do this?

## 2021-06-11 NOTE — TELEPHONE ENCOUNTER
Medication Question or Clarification  Who is calling: Patient  What medication are you calling about (include dose and sig)?: Hydrochlorothiazide and Simvastatin  Who prescribed the medication?: Dr. Shanthi Danielle Today  What is your question/concern?: Needs it sent to pharmacy today. It is not there   [facility]ed [facility]acy:[facility] [address] [phone]  Okay to leave a detailed message?:Yes Please call patient she went to pick them up she wants to pick them up today.

## 2021-06-11 NOTE — PROGRESS NOTES
Assessment/Plan:       1. Benign essential hypertension  BP within goal range on current medication regimen.  Labs updated as below and normal.  Renal function is back to normal, potassium normal.  Patient declines bothersome frequent urination, does not desire a change in this regimen.  Recommended follow-up with labs in 1 year.  - HM2(CBC w/o Differential)  - Lipid Cascade RANDOM  - Basic Metabolic Panel  - hydroCHLOROthiazide (HYDRODIURIL) 25 MG tablet; Take 1 tablet (25 mg total) by mouth daily.  Dispense: 90 tablet; Refill: 3    2. Pure hypercholesterolemia  Continues on simvastatin and tolerates this well.  Labs updated as below.  - HM2(CBC w/o Differential)  - Lipid Cascade RANDOM  - Basic Metabolic Panel  - simvastatin (ZOCOR) 40 MG tablet; TAKE 1 TABLET(40 MG) BY MOUTH AT BEDTIME  Dispense: 90 tablet; Refill: 3        Subjective:       Crista Harman is a 85 y.o. female who presents for a med check.  Patient makes it very clear that she is not happy to have had to come in.  It sounds like she was asked to come in to have her blood pressure checked and lab work updated prior to further refills of her antihypertensive medications.  Patient expresses that when she is feeling well, she does not feel that she should have to come in just to get medication refills.  We discussed her last labs showing low potassium and mildly diminished kidney function, discussed the need to monitor her medications.  Patient expresses that if she needs regular visits and labs to continue her medications, she may consider seeking care elsewhere.  She does not wish to discuss any other issues today.  She denies chest pain, shortness of breath or lower extremity edema.  She is very independent she states.    Labs Reviewed:  Lab Results   Component Value Date    ALT 18 06/24/2020    AST 29 06/24/2020    INR 0.95 08/25/2011         The following portions of the patient's history were reviewed and updated as appropriate: allergies,  "current medications, past medical history, past social history and problem list.      Current Outpatient Medications:      aspirin 81 MG EC tablet, Take 1 tablet (81 mg total) by mouth daily., Disp: 150 tablet, Rfl: 2     hydroCHLOROthiazide (HYDRODIURIL) 25 MG tablet, TAKE 1 TABLET(25 MG) BY MOUTH DAILY, Disp: 90 tablet, Rfl: 0     MULTIVITAMIN ORAL, Take 1 capsule by mouth daily., Disp: , Rfl:      omega-3 fatty acids-fish oil (OMEGA 3 FISH OIL) 684-1,200 mg CpDR, Take 1 capsule by mouth daily., Disp: , Rfl:      simvastatin (ZOCOR) 40 MG tablet, TAKE 1 TABLET(40 MG) BY MOUTH AT BEDTIME, Disp: 90 tablet, Rfl: 0     VITAMIN A/VITAMIN D3 (NATURAL VITAMIN D ORAL), Take 1,000 Units by mouth daily. , Disp: , Rfl:     Review of Systems   Pertinent items are noted in HPI.      Objective:      /68 (Patient Site: Left Arm, Patient Position: Sitting, Cuff Size: Adult Regular)   Pulse 70   Ht 5' 3.5\" (1.613 m)   Wt 102 lb 9.6 oz (46.5 kg)   Breastfeeding No   BMI 17.89 kg/m      General appearance: alert, appears stated age and cooperative  Head: Normocephalic, without obvious abnormality, atraumatic  Eyes: conjunctivae clear, sclerae anicteric  Neck: no adenopathy, no carotid bruit, no JVD, supple, symmetrical, trachea midline and thyroid not enlarged, symmetric, no tenderness/mass/nodules  Lungs: clear to auscultation bilaterally  Heart: regular rate and rhythm, S1, S2 normal, no murmur, click, rub or gallop  Abdomen: soft, non-tender; bowel sounds normal; no masses,  no organomegaly  Extremities: extremities normal, atraumatic, no cyanosis or edema  Neurologic: Grossly normal, alert and oriented x3        Results for orders placed or performed in visit on 09/30/20   HM2(CBC w/o Differential)   Result Value Ref Range    WBC 6.2 4.0 - 11.0 thou/uL    RBC 3.90 3.80 - 5.40 mill/uL    Hemoglobin 12.2 12.0 - 16.0 g/dL    Hematocrit 36.6 35.0 - 47.0 %    MCV 94 80 - 100 fL    MCH 31.3 27.0 - 34.0 pg    MCHC 33.3 32.0 " - 36.0 g/dL    RDW 17.9 (H) 11.0 - 14.5 %    Platelets 252 140 - 440 thou/uL    MPV 12.4 8.5 - 12.5 fL   Lipid Cascade RANDOM   Result Value Ref Range    Cholesterol 199 <=199 mg/dL    Triglycerides 92 <=149 mg/dL    HDL Cholesterol 77 >=50 mg/dL    LDL Calculated 104 <=129 mg/dL    Patient Fasting > 8hrs? No    Basic Metabolic Panel   Result Value Ref Range    Sodium 141 136 - 145 mmol/L    Potassium 3.8 3.5 - 5.0 mmol/L    Chloride 101 98 - 107 mmol/L    CO2 29 22 - 31 mmol/L    Anion Gap, Calculation 11 5 - 18 mmol/L    Glucose 121 70 - 125 mg/dL    Calcium 10.2 8.5 - 10.5 mg/dL    BUN 16 8 - 28 mg/dL    Creatinine 0.88 0.60 - 1.10 mg/dL    GFR MDRD Af Amer >60 >60 mL/min/1.73m2    GFR MDRD Non Af Amer >60 >60 mL/min/1.73m2

## 2021-06-15 NOTE — TELEPHONE ENCOUNTER
Faxed order and demo to [facility] at fax [phone]. They will review and reach out to Osito to solomon.

## 2021-06-15 NOTE — TELEPHONE ENCOUNTER
----- Message from Alva Zimmer MD sent at 3/7/2021  8:37 AM CST -----    Please inform patient and daughter-in-law that most labs are fairly normal except potassium that is mildly low.  It has been off and on low in the past.  Encourage foods rich in potassium.  I can also send a potassium supplement pill if patient is willing to take medication.  Please mail a letter with the comments.    Alva Zimmer MD  3/7/2021

## 2021-06-15 NOTE — TELEPHONE ENCOUNTER
Left message to call back for: Results   Information to relay to patient:  Left VM for Mouna and daughter in-law Cynthia to return call. Please relay results and recommendations.

## 2021-06-15 NOTE — PROGRESS NOTES
Assessment:     1. Memory deficits  escitalopram oxalate (LEXAPRO) 5 MG tablet    Vitamin B12    Thyroid Cascade    AMB REFERRAL TO MENTAL HEALTH AND ADDICTION  - Adult (18+); Assessment and Testing; Neuropsychological Testing; [facility] [phone]; We will contact you to schedule the appointment or please call with any questions    HM1(CBC and Differential)    Comprehensive Metabolic Panel    Folate, Serum    Syphilis Screen, Cascade   2. Peripheral vascular disease (H)     3. Episode of recurrent major depressive disorder, unspecified depression episode severity (H)     4. Anxiety        Medical decision making: Patient's PHQ and LACHO scores are very concerning.  Although, looks like she is more anxious and depressed due to her concerns with memory.  She is very resistant to plan of care.  I had detailed discussion about getting a life alert brace and having a neuropsych testing.  Although patient is not agreeable, her daughter-in-law informs me that I should place orders as her children would like her to pursue additional testing.  I also approached her driving.  So far, she has not had any major issues.  Moving along, she may need evaluation for the same.  After discussing pros and cons, patient decides that she would like something for her anxiety and we will start with a low-dose Lexapro.  I did mention with the patient that she may be a candidate for further medication after her neuropsych testing.     Plan:      The diagnosis was discussed with the patient and evaluation and treatment plans outlined.  See orders for lab and imaging studies.  Further follow-up plans will be based on outcome of lab/imaging studies; see orders.  Follow up: Return in about 3 months (around 6/5/2021) for Annual physical.     Subjective:      Crista aHrman is a  female who presents for evaluation of   Chief Complaint   Patient presents with     Memory Loss     Having memory issuse.      Depression      "Increased depression and anxiety.      Patient is accompanied by daughter-in-law who brings her today for concerns of memory concerns.  Patient lives by herself with a dog and is usually able to do her ADLs.  Drives to nearby To get her groceries and prefers to stay independent.  There have been a few instances over the last year where memory has been not good  Because she does not remember,  she gets anxious    Last week, son took for checque blank  This is not been shipped.  However she kept asking her son and daughter-in-law where she could have kept the checks and check the whole house.    Patient is fairly adamant that she does not want to pursue any work-up and does not want to come to the doctors.  She informs me that she has started ruminating about her 's death which was many years ago.  She informs me that she talks to her dog and feels like the dog is started talking back to her and laughs about it.  She feels that she is just fine.  She does not remember if she has a living will, however the daughter-in-law informs me that she does have a healthcare directive and a living will and in fact she has the power of  (daughter-in-law)  Daughter-in-law checks on her every day after coming back from work.      The following portions of the patient's history were reviewed and updated as appropriate: allergies, current medications, past family history, past medical history, past social history, past surgical history and problem list.    Review of Systems  Constitutional: negative  Respiratory: negative  Cardiovascular: negative  Gastrointestinal: negative       Objective:   /59 (Patient Site: Left Arm, Patient Position: Sitting, Cuff Size: Adult Regular)   Pulse 68   Resp 16   Ht 5' 3.5\" (1.613 m)   Wt 105 lb 6.4 oz (47.8 kg)   BMI 18.38 kg/m    GENERAL: Healthy, alert and no distress  EYES: Eyes grossly normal to inspection. No discharge or erythema, or obvious scleral/conjunctival " abnormalities.  NECK: no adenopathy  RESP: lungs clear to auscultation - no rales, rhonchi or wheezes  CV: regular rates and rhythm  NEURO: Cranial nerves grossly intact. Mentation and speech appropriate for age.  PSYCH: Mentation appears normal, affect normal/bright, judgement and insight intact, normal speech and appearance well-groomed    Little interest or pleasure in doing things: Several days  Feeling down, depressed, or hopeless: Nearly every day  Trouble falling or staying asleep, or sleeping too much: Not at all  Feeling tired or having little energy: Not at all  Poor appetite or overeating: Not at all  Feeling bad about yourself - or that you are a failure or have let yourself or your family down: More than half the days  Trouble concentrating on things, such as reading the newspaper or watching television: Not at all  Moving or speaking so slowly that other people could have noticed. Or the opposite - being so fidgety or restless that you have been moving around a lot more than usual: Several days  Thoughts that you would be better off dead, or of hurting yourself in some way: Several days  PHQ-9 Total Score: 8  If you checked off any problems, how difficult have these problems made it for you to do your work, take care of things at home, or get along with other people?: Somewhat difficult    Feeling nervous, anxious or on edge: 0 (3/5/2021  2:00 PM)  Not being able to stop or control worry: 3 (3/5/2021  2:00 PM)  Worrying too much about different things: 3 (3/5/2021  2:00 PM)  Trouble relaxin (3/5/2021  2:00 PM)  Being so restless that is is hard to sit still: 0 (3/5/2021  2:00 PM)  Becoming easily annnoyed or irritable: 0 (3/5/2021  2:00 PM)  Feeling afraid as if something awful might happen: 3 (3/5/2021  2:00 PM)  LACHO-7 Total: 10 (3/5/2021  2:00 PM)  How difficult did these problems make it for you to do your work, take care of things at home or get along with other people? : Somewhat difficult  (3/5/2021  2:00 PM)  How difficult did these problems make it for you to do your work, take care of things at home or get along with other people? : Somewhat difficult (3/5/2021  2:00 PM)

## 2021-06-15 NOTE — TELEPHONE ENCOUNTER
Incoming call from son Osito regarding [facility] referral. Can you please help them get scheduled for this at the ? Fax number osito gave me for the referral is [phone]. He said any day or time is fine just needs to be at least a week out so he can request the day off.

## 2021-06-15 NOTE — TELEPHONE ENCOUNTER
Mouna informed of results and will mail the letter also.  She also requested a list of high potassium foods.  I printed Discharge Instructions: Eating a High-Potassium Diet from [facility]  And will mail that with the letter.

## 2021-06-16 ENCOUNTER — COMMUNICATION - HEALTHEAST (OUTPATIENT)
Dept: FAMILY MEDICINE | Facility: CLINIC | Age: 86
End: 2021-06-16

## 2021-06-16 NOTE — TELEPHONE ENCOUNTER
Discussed with traci hargrove caroline is having  Around dinner.  We are going to increase her Lexapro to 10 mg daily and add in Seroquel half tablet around dinner.  Advised to use with caution and to hopefully discontinue in the next several weeks    Total time spent on phone 15 minutes

## 2021-06-16 NOTE — TELEPHONE ENCOUNTER
Incoming phone call from daughter in law Cynthia wanting to talk to Dr. Hodge about Mouna's anxiety and depression and sun downer problems. Cynthia requesting a call back @ [phone].

## 2021-06-16 NOTE — PROGRESS NOTES
Subjective   Crista Harman is 85 y.o. and presents today for the following health issues   HPI      Here with daughter in law today- Cynthia  For follow up from hospitalization  Daughter worried about some things that have happened since hospitalization for acute angina.   Tried to cut off skin tag on chest. Thinks tripped over dog, fell last night- hit head and has lac on R side of head. Did not lose consciousness. No headaches. Here with daughter in law. Been years noticing changes in short term memory. Had plans to move into home with family by end of august. (mini  Apartment)  But currently living with family upstairs   Has neuropsych testing end of may. lexapro was initiated recently for anxiety and seems to have been helping for her . Will watch lexapro dose by end of month and increase if needed.   Had episode of chest pain 1130 that precipitated hospitalization.   Potassium was low in hospital.   Had been on simvastatin 40mg daily and was changed to atorvastatin 80mg daily. Patient no longer smokes but family does       Hospital Follow-up Visit:    Hospital/Nursing Home/IP Rehab Facility: [facility]  Date of Admission: 3/27/21  Date of Discharge: 4/3/21  Reason(s) for Admission: NSTEMI    Was your hospitalization related to COVID-19? No  Problems taking medications regularly:  None  Medication changes since discharge: None  Problems adhering to non-medication therapy:  None    Summary of hospitalization:  [facility] discharge summary reviewed 85-year-old female with a history of hypertension, hyperlipidemia, cognitive impairment, depression and low body weight that presents with chest pain found to have abnormal EKG subsequently underwent coronary angiogram and showed ostial and distal RCA 95% stenosis.  No stents placed and deferred for goal-directed medical therapy.She had severe delirium after procedure here, has been seen by cardiology, neurology and  psychiatry     1.chest pain secondary to CAD/non-STEMI  Patient had a mildly elevated second troponin at 0.32.  She was seen by cardiology, Coronary angiogram showing ostial distal RCA 95% stenosis, due to heavy  calcified and challenging lesion high risk for complication.  Patient was recommended to have medical management, was started on Plavix, isosorbide and continued on her statin and aspirin.  She was started on low-dose metoprolol.  Had mild asymptomatic bradycardia, cardiology recommended continuation of her metoprolol as her bradycardia is asymptomatic in the 50s.  TTE showed a ejection fraction of 67%.  She had no further episodes of chest pain.    2.Hypokalemia  -Probably related to hydrochlorothiazide  -Replacing per nursing protocol.  Recheck after discharge as she was restarted on her hydrochlorothiazide at discharge.    3.Essential hypertension  Restarted hydrochlorothiazide discharge as her blood pressure was starting to elevate, continue isosorbide., added on low-dose metoprolol.  Discussed with cardiology on day of discharge.     4.Hyperlipidemia  -Continue statin    5.Cognitive disorder with metabolic encephalopathy/delirium, question underlying dementia  Had significant delirium after angiogram.  Seen by psychiatry and neurology.  Evaluation unremarkable including head CT and MRI negative for any structural lesions.  EEG showed no signs of seizure.  As an outpatient RPR, folate, B12 and TSH were unremarkable.  UA and ammonia unremarkable.  Thiamine level pending.  She was treated with tapering Zyprexa and melatonin with significant improvement of her delirium though she was mildly confused but cooperative at discharge.  Would recommend outpatient neuropsychiatry testing.  No driving, patient will live with her son Osito at discharge.  Apparently has a follow-up as an outpatient with neurology.     6.[place]  Metoprolol dose decreased, discussed with cardiology, they wanted to continue on her  metoprolol as her heart rates were in the 50s but asymptomatic.  Continue to monitor as outpatient.     7.Depression  -Continue Lexapro     8.Low body weight  -BMI 18  -RD consulted     Anemia-likely chronic, no signs of GI loss.     Code-Full code--- discussed with family, not ready to change to DNR/DNI though she does have likely dementia and underlying coronary artery disease.  Recommend further discussion with her primary physician as I suspect she would not do well with CPR         Diagnostic Tests/Treatments reviewed.  Follow up needed: neuropsych testing may/ follow up cardiology   Other Healthcare Providers Involved in Patient s Care:         Specialist appointment - cardiology and neurology   Update since discharge: improved.      Post Discharge Medication Reconciliation: discharge medications reconciled, continue medications without change.  Plan of care communicated with patient and family                     Review of Systems       Physical exam  Vitals:    04/13/21 1413   BP: 130/64   Pulse: (!) 47   Resp: 24   Temp: 96.8  F (36  C)   SpO2: 98%     Gen- NAD  Heart-bradycardic rate regular rhythm  Lungs clear to auscultation bilateral  Chest wall without concerning areas of been action.  Large skin tag offered to remove-frozen off today with liquid nitrogen  Head with slight dried blood posterior head without signs of fracture  Neuro- Alert and oriented cranial nerves II through XII grossly intact      ASSESSMENT and PLAN:  1. Hospital discharge follow-up  -Reviewing labs today to ensure no further decrease in her hemoglobin or potassium.  Reviewed recommended follow-up.  We did discuss CODE STATUS briefly and explained more to daughter in law and patient that likely CPR would not be advised in the event of coding.  They agree but did not formally fill out CODE STATUS.  They will discuss further as a family.  - Basic Metabolic Panel  - HM2(CBC w/o Differential)    2. Acute non-ST elevation myocardial  infarction (NSTEMI) (H)  Medically managed at this time.  No acute anginal symptoms.  We will follow up with cardiology.    3. Dyslipidemia, goal LDL below 70  Tolerating increased dose to atorvastatin.  Continue and will reevaluate in the next several months    4. Essential hypertension  Blood pressure is stable today on current medication regimen.  Heart rate is also just below 50 but patient is asymptomatic  - Basic Metabolic Panel    5. Cognitive and behavioral changes  Patient likely has some underlying dementia.  She is going to be going for neuropsychiatric testing and evaluation by the end of May.  She is in a safe place where she has 24/7 care with her family.  She did discuss other medications that she can take to slow the progression and we talked about coverage and vitamin E.  They want to wait on Aricept and such until they have the results back from her testing and assessment.    6. Moderate recurrent major depression (H)  Patient recently initiated 6 weeks ago on Lexapro that seems to be working well for her.  Continue 5 mg dose for now.    7. Hypokalemia  Resolved.  Likely was due to hydrochlorothiazide but currently is back on medication dosing and blood pressure is much better controlled    8. Sleep difficulties  May trial melatonin as needed    9. Anemia, unspecified type  Mildly decreased hemoglobin likely from chronic disease however will check iron levels and B12  - HM2(CBC w/o Differential)    10.  Head injury-no loss of consciousness.  We reviewed in the future because of blood thinner she needs to be seen in the emergency room if she has a head injury    11.  Skin tag-catching on clothing and irritating.  Was frozen today with liquid nitrogen.  Patient tolerated well        Cryotherapy Procedure Note    Pre-operative Diagnosis: Calcified skin tag    Post-operative Diagnosis: same    Locations: Chest wall    Indications: Catching on clothing and patient at risk for cutting off and bleeding on  blood thinners    Patient informed of risks (permanent scarring, infection, light or dark discoloration, bleeding, infection, weakness, numbness and recurrence of the lesion) and benefits of the procedure and verbal informed consent obtained.    The areas are treated with liquid nitrogen therapy, frozen until ice ball extended 1 mm beyond lesion, allowed to thaw, and treated again. The patient tolerated procedure well.  The patient was instructed on post-op care, warned that there may be blister formation, redness and pain. Recommend OTC analgesia as needed for pain.    Condition:  Stable    Complications:  none.    Plan:  1. Instructed to keep the area dry and covered for 24-48h and clean thereafter.  2. Warning signs of infection were reviewed.    3. Recommended that the patient use OTC analgesics as needed for pain.             We discussed following up in the next several months after she is done her neuropsychiatric testing      Patient Instructions   -consider starting over the counter prevagen   -increased vitamin E can be helpful  For alzheimers 2000IU daily.   -let me know if you need refills.       Orders Placed This Encounter   Procedures     Basic Metabolic Panel     HM2(CBC w/o Differential)     Medications Discontinued During This Encounter   Medication Reason     atorvastatin (LIPITOR) 80 MG tablet Reorder     melatonin 3 mg Tab tablet        No follow-ups on file.    DATA REVIEWED:  Additional History from Old Records Summarized (2): Reviewed all hospital notes and previous notes by Dr. LARA  regarding anxiety  Decision to Obtain Records (1):    Radiology Tests Summarized or Ordered (1): Reviewed head imaging  Labs Reviewed or Ordered (1): Labs reviewed and ordered again  Medicine Test Summarized or Ordered (1): Reviewed EEG and MRI of the brain  Independent Review of EKG, X-RAY, or RAPID STREP (2 each):      The visit lasted a total of 55 minutes face to face with the patient. Over 50% of the time was  spent counseling and educating the patient   \

## 2021-06-16 NOTE — PATIENT INSTRUCTIONS - HE
-consider starting over the counter prevagen   -increased vitamin E can be helpful  For alzheimers 2000IU daily.   -let me know if you need refills.

## 2021-06-17 NOTE — TELEPHONE ENCOUNTER
Incoming call from daughter stating she went to the pharm to  meds and was not able to  these 2. Looks like these are new from the hospital. Please send refills asap. Call Cynthia once refills are sent

## 2021-06-17 NOTE — TELEPHONE ENCOUNTER
Incoming call from Dr Win with neurology. He did her testing today and he said she had a very negative emotional reaction to the cognitive testing. Pt made a couple comments about self harm. Pts family was there with her and Dr Win did not feel that she was in any immediate danger. Pt went home with her son and family will be staying with her. He just wanted to let you know so we could follow up with her. It looks like Dr LARA put the referral for testing in but she recently saw Dr Hodge so tigre who wants to address this

## 2021-06-17 NOTE — TELEPHONE ENCOUNTER
Spoke to Cynthia and let her know meds were sent. Confirmed that she is only taking 1/2 tab of metoprolol

## 2021-06-17 NOTE — TELEPHONE ENCOUNTER
Ryann,  Looks like you saw here recently and were coming with plan of care.  Perhaps she is not safe to live by herself ?  Alva Zimmer MD  5/21/2021

## 2021-06-17 NOTE — TELEPHONE ENCOUNTER
No problem.  Refill sent for her.  When I offered to refill these prescriptions when I saw her in April they had mentioned that they had a cardiology visit and they were going to wait until that time.  Does not look like he called them and so I will go ahead and refill.  Please confirm that she is only taking 1/2 tablet of her metoprolol 25 mg.  If she is taking a full tablet I need to adjust the prescription since it was written as half tablet but cardiology mention 25 mg is what she is on

## 2021-06-17 NOTE — PROGRESS NOTES
ASSESSMENT & PLAN:  Crista was seen today for medication management.    Diagnoses and all orders for this visit:    Benign essential hypertension  -     hydroCHLOROthiazide (HYDRODIURIL) 25 MG tablet; TAKE 1 TABLET(25 MG) BY MOUTH DAILY  -     Comprehensive Metabolic Panel    Pure hypercholesterolemia  -     simvastatin (ZOCOR) 40 MG tablet; TAKE 1 TABLET(40 MG) BY MOUTH AT BEDTIME  -     Lipid Cascade      -See below for instructions to patient.  She was not fasting but since she did not come back in the last 12 months I will obtain a lipid panel on her.  Continue 81 mg daily.  Regarding her cardiac murmur we will continue to monitor at this time since she is not having any heart failure symptoms and she did decline echocardiogram-likely secondary to long-standing hypertension which makes increasing the dose of her hydrochlorothiazide all the more necessary.    Patient Instructions   Increase your hydrochlorothiazide to 25mg once daily (schedule appointment to come back for blood pressure check within 2-4 weeks).   -also maybe consider checking blood pressure at home and bring cuff in to compare.   Or try checking it at your local pharmacy and let us know the reading [phone].  -continue simvastatin   -continue aspirin 81mg daily.   -discussed advance directive planning.   -consider doing advance directive and bringing that in.        Orders Placed This Encounter   Procedures     Lipid Cascade     Order Specific Question:   Fasting is required?     Answer:   Yes     Comprehensive Metabolic Panel     Medications Discontinued During This Encounter   Medication Reason     aspirin 325 MG tablet Therapy completed     MELATONIN, BULK, MISC Therapy completed     magnesium oxide 250 mg Tab Therapy completed     MELATONIN-NOE-HERBAL NO.183 ORAL Therapy completed     hydroCHLOROthiazide (HYDRODIURIL) 12.5 MG tablet Therapy completed     hydroCHLOROthiazide (HYDRODIURIL) 12.5 MG tablet Therapy completed      hydroCHLOROthiazide (HYDRODIURIL) 12.5 MG tablet Reorder     simvastatin (ZOCOR) 40 MG tablet Reorder       No Follow-up on file.     CHIEF COMPLAINT:  Chief Complaint   Patient presents with     Medication Management     Med Refills        HISTORY OF PRESENT ILLNESS:  Crista is a 82 y.o. female presenting to the clinic today for medication management. She is not fasting today.     Hypertension: She has a blood pressure cuff at home which she does not think works. She denies headaches or dizziness or symptoms of high blood pressure. She notes her blood pressure always is high when coming to clinic, but is not normally at home. She can tell that her blood pressure is high currently, as she is warm. Overall her blood pressure medication is working well for her. She is able to walk and run with her dog and her great-grandson. She took her HCTZ today. She drinks decaf coffee and does not consume caffeine.       REVIEW OF SYSTEMS:   She is taking a baby Asprin daily. She is not sleeping well, as she wakes up in the night. She denies fatigue during the day. She is not take melatonin anymore.  She is taking some other supplements but cannot remember the names of all of them.  She is not seen by any specialists. All other systems are negative.  Specifically she is not having any symptoms of heart failure.  No orthopnea or paroxysmal nocturnal dyspnea.  She has a history of whitecoat hypertension.  She did not realize that she had any cardiac valve issues but cardiac murmur has been heard on previous exams.     PFSH:  She has a stent.     TOBACCO USE:  History   Smoking Status     Former Smoker     Quit date: 1/22/2007   Smokeless Tobacco     Never Used        VITALS:  Vitals:    04/02/18 1345 04/02/18 1406   BP: 158/78 160/70   Patient Site: Right Arm Right Arm   Patient Position: Sitting Sitting   Cuff Size: Adult Regular    Pulse: 68    Resp: 18    Temp: 98.7  F (37.1  C)    TempSrc: Oral    Weight: 127 lb 12.8 oz (58  kg)      Wt Readings from Last 3 Encounters:   04/02/18 127 lb 12.8 oz (58 kg)   03/23/17 135 lb (61.2 kg)   08/01/16 136 lb (61.7 kg)     Body mass index is 23 kg/(m^2).  No LMP recorded. Patient is postmenopausal.     PHYSICAL EXAM:  GENERAL:  Reveals an alert 82 y.o. female in NAD.  Vitals:  Per nursing notes.  CARDIAC:  Regular rate and rhythm  Grade 2 out of 6 crescendo systolic murmur heard greatest over the aorta.  Nonradiating.  LUNGS: Clear.  Respiratory effort normal.    Neck supple without any lymphadenopathy.  Carotid arteries without any vascular bruit.   NEURO:  Cranial nerves II-XII intact.     PSYCH:  Mood appropriate, memory intact.   Extremities without any peripheral edema.    QUALITY MEASURES:  I have had an Advance Directives discussion with the patient.  Honoring choices form was given to her.    DATA REVIEWED:  ADDITIONAL HISTORY SUMMARIZED (2): None.   DECISION TO OBTAIN EXTRA INFORMATION (1): None.   RADIOLOGY TESTS (1): None.  LABS (1): Reviewed and ordered labs.   MEDICINE TESTS (1): None.  INDEPENDENT REVIEW (2 each): None.     The visit lasted a total of 21 minutes face to face with the patient. Over 50% of the time was spent counseling and educating the patient about medication management.     I, Pura Flores, am scribing for and in the presence of Dr. Hodge.  Ryann MELTON DO , personally performed the services described in this documentation, as scribed by Pura Flores in my presence, and it is both accurate and complete.    This note has been dictated using voice recognition software. Any grammatical or context distortions are unintentional and inherent to the software.     MEDICATIONS:  Current Outpatient Prescriptions   Medication Sig Dispense Refill     hydroCHLOROthiazide (HYDRODIURIL) 25 MG tablet TAKE 1 TABLET(25 MG) BY MOUTH DAILY 90 tablet 3     MULTIVITAMIN ORAL Take 1 capsule by mouth daily.       omega-3 fatty acids-fish oil (OMEGA 3 FISH OIL) 684-1,200 mg CpDR Take 1  capsule by mouth daily.       simvastatin (ZOCOR) 40 MG tablet TAKE 1 TABLET(40 MG) BY MOUTH AT BEDTIME 90 tablet 3     VITAMIN A/VITAMIN D3 (NATURAL VITAMIN D ORAL) Take 1,000 Units by mouth daily.        No current facility-administered medications for this visit.         Total data points: 1

## 2021-06-18 NOTE — PATIENT INSTRUCTIONS - HE
Patient Instructions by Alva Zimmer MD at 3/5/2021  1:50 PM     Author: Alva Zimmer MD Service: -- Author Type: Physician    Filed: 3/5/2021  2:22 PM Encounter Date: 3/5/2021 Status: Addendum    : Alva Zimmre MD (Physician)    Related Notes: Original Note by Alva Zimmer MD (Physician) filed at 3/5/2021  2:14 PM         Wt Readings from Last 7 Encounters:   03/05/21 105 lb 6.4 oz (47.8 kg)   09/30/20 102 lb 9.6 oz (46.5 kg)   06/25/19 120 lb (54.4 kg)   09/18/18 125 lb 6 oz (56.9 kg)   04/02/18 127 lb 12.8 oz (58 kg)   03/23/17 135 lb (61.2 kg)   08/01/16 136 lb (61.7 kg)       Patient Education     Alzheimer Disease  Alzheimer disease is a brain illness that can happen usually in older adults, but it can also happen as early as age 40. It is the most common cause of dementia. It is a progressive disease. This means it gets worse over time.  What is Alzheimer disease?  Alzheimer disease causes a series of changes to nerves of the brain. Some nerves form into clumps and tangles, and lose some of their connections to other nerves.  Healthcare providers dont fully understand what causes Alzheimer disease. But they think these may be some of the causes:    Age and family history    Certain genes    Abnormal protein deposits in the brain    Environmental factors    Problems with a persons immune system    Possibly infections  Symptoms of Alzheimer disease  The disease causes changes in behavior and thinking known as dementia. The symptoms include:    Memory loss    Confusion    Restlessness    Personality and behavior changes    Problems with judgment    Problems communicating with others    Inability to follow directions    Lack of emotion  Diagnosing Alzheimer disease  No single test is able to diagnose Alzheimer disease. Instead healthcare providers use a series of tests to rule out other health conditions. The tests may include:    A complete medical history. This may include  questions about overall health and past health problems. The healthcare provider may ask how well the person can do daily tasks. The healthcare provider may ask family or close friends about any changes in behavior or personality.    Mental status test. This is a test of memory, problem solving, attention, counting, and language.     Standard medical tests. These may include blood and urine tests to find possible causes for the problem.    Brain imaging tests.  CT, MRI, or positron emission tomography (PET) may be used to rule out other causes of the problem.  Treating Alzheimer disease  Alzheimer disease has no cure. Instead healthcare providers can help ease some symptoms. This can make a person with Alzheimer more comfortable. Treatment can also make it easier for their caregivers to take care of them.  Some medicines may help slow the decline of a persons memory, thinking, and language skills. They may help with problems of behavior, such as aggression. They can lessen hallucinations and delusions. These medicines can work for some but not all people. And they may help for only a limited time. Medicines include:    Cholinesterase inhibitors    Donepezil    Galantamine    Rivastigmine    Memantine  In some cases, behavior problems can be caused by medicine side effects. Talk with the persons healthcare provider about all medicines he or she is taking.  Keeping healthy  For a person with Alzheimer, its important to stay healthy. Good nutrition and physical and social activity are vital. A calm and well-structured environment will help. Make sure to keep up with healthcare appointments and managing other health conditions, such as diabetes. Some people benefit from having a nutritionist help to prevent weight loss.    Caring for someone with Alzheimer  A person with Alzheimer will need more caregiving over time. Talk with your healthcare provider about caregiving resources.   Date Last Reviewed: 4/1/2018     8049-6058 [employer]. [address] [place] [address], [place] [address]. All rights reserved. This information is not intended as a substitute for professional medical care. Always follow your healthcare professional's instructions.

## 2021-06-19 NOTE — LETTER
Letter by Alva Zimmer MD at      Author: Alva Zimmer MD Service: -- Author Type: --    Filed:  Encounter Date: 6/27/2019 Status: (Other)         Crista Harman  [address] [facility] [id number]             June 27, 2019         Dear Ms. Harman,    Below are the results from your recent visit:    Resulted Orders   Basic Metabolic Panel   Result Value Ref Range    Sodium 141 136 - 145 mmol/L    Potassium 4.0 3.5 - 5.0 mmol/L    Chloride 100 98 - 107 mmol/L    CO2 26 22 - 31 mmol/L    Anion Gap, Calculation 15 5 - 18 mmol/L    Glucose 91 70 - 125 mg/dL    Calcium 10.7 (H) 8.5 - 10.5 mg/dL    BUN 12 8 - 28 mg/dL    Creatinine 0.99 0.60 - 1.10 mg/dL    GFR MDRD Af Amer >60 >60 mL/min/1.73m2    GFR MDRD Non Af Amer 54 (L) >60 mL/min/1.73m2    Narrative    Fasting Glucose reference range is 70-99 mg/dL per  American Diabetes Association (ADA) guidelines.       Your labs are normal/stable.  Your calcium is minimally elevated.  Sometimes it can be a false value.  However, this should be rechecked with any future lab draws.     Please call with questions or contact us using [facility].    Sincerely,        Electronically signed by Alva Zimmer MD

## 2021-06-20 NOTE — LETTER
Letter by Ryann Hodge DO at      Author: Ryann Hodge DO Service: -- Author Type: --    Filed:  Encounter Date: 6/25/2020 Status: (Other)         Crista Harman  [address] [facility] [address]             June 25, 2020         Dear Ms. Harman,    Below are the results from your recent visit:    Resulted Orders   Comprehensive Metabolic Panel   Result Value Ref Range    Sodium 141 136 - 145 mmol/L    Potassium 3.3 (L) 3.5 - 5.0 mmol/L    Chloride 102 98 - 107 mmol/L    CO2 26 22 - 31 mmol/L    Anion Gap, Calculation 13 5 - 18 mmol/L    Glucose 149 (H) 70 - 125 mg/dL    BUN 12 8 - 28 mg/dL    Creatinine 1.03 0.60 - 1.10 mg/dL    GFR MDRD Af Amer >60 >60 mL/min/1.73m2    GFR MDRD Non Af Amer 51 (L) >60 mL/min/1.73m2    Bilirubin, Total 0.7 0.0 - 1.0 mg/dL    Calcium 9.9 8.5 - 10.5 mg/dL    Protein, Total 7.1 6.0 - 8.0 g/dL    Albumin 4.3 3.5 - 5.0 g/dL    Alkaline Phosphatase 35 (L) 45 - 120 U/L    AST 29 0 - 40 U/L    ALT 18 0 - 45 U/L    Narrative    Fasting Glucose reference range is 70-99 mg/dL per  American Diabetes Association (ADA) guidelines.       Your blood pressure was under good control so I am okay with you continuing your current dose of hydrochlorothiazide as long as you are not having any dizziness or lightheadedness because your potassium was a little low.  That could be common with this prescription medication but in the past it has been in normal range.  I would recommend that you are increasing your potassium in your diet by maybe either eating a banana a day or berries or other foods that are higher in potassium.  We could always recheck this level if you would like but I also suggest a minimum of having your blood work done every 6 months with a visit to me to make sure everything is stable.  Your blood sugar was a tiny bit high as well so watching for diabetes would be the other thing    Please call with questions or contact us using  MyChart.    Sincerely,        Electronically signed by Ryann Hodge DO

## 2021-06-20 NOTE — LETTER
Letter by Shanthi Danielle MD at      Author: Shanthi Danielle MD Service: -- Author Type: --    Filed:  Encounter Date: 9/30/2020 Status: (Other)         Crista Harman  [address] [facility] [address]             September 30, 2020         Dear Ms. Harman,    Below are the results from your recent visit:    Resulted Orders   HM2(CBC w/o Differential)   Result Value Ref Range    WBC 6.2 4.0 - 11.0 thou/uL    RBC 3.90 3.80 - 5.40 mill/uL    Hemoglobin 12.2 12.0 - 16.0 g/dL    Hematocrit 36.6 35.0 - 47.0 %    MCV 94 80 - 100 fL    MCH 31.3 27.0 - 34.0 pg    MCHC 33.3 32.0 - 36.0 g/dL    RDW 17.9 (H) 11.0 - 14.5 %    Platelets 252 140 - 440 thou/uL    MPV 12.4 8.5 - 12.5 fL   Lipid Cascade RANDOM   Result Value Ref Range    Cholesterol 199 <=199 mg/dL    Triglycerides 92 <=149 mg/dL    HDL Cholesterol 77 >=50 mg/dL    LDL Calculated 104 <=129 mg/dL    Patient Fasting > 8hrs? No    Basic Metabolic Panel   Result Value Ref Range    Sodium 141 136 - 145 mmol/L    Potassium 3.8 3.5 - 5.0 mmol/L    Chloride 101 98 - 107 mmol/L    CO2 29 22 - 31 mmol/L    Anion Gap, Calculation 11 5 - 18 mmol/L    Glucose 121 70 - 125 mg/dL    Calcium 10.2 8.5 - 10.5 mg/dL    BUN 16 8 - 28 mg/dL    Creatinine 0.88 0.60 - 1.10 mg/dL    GFR MDRD Af Amer >60 >60 mL/min/1.73m2    GFR MDRD Non Af Amer >60 >60 mL/min/1.73m2    Narrative    Fasting Glucose reference range is 70-99 mg/dL per  American Diabetes Association (ADA) guidelines.       Your labs look good.  Kidney function is back to normal (was reduced in the last check), cholesterol levels are within goal range.  Blood sugar is slightly elevated.  Blood counts are normal.  I would recommend rechecking these labs at a yearly visit.    Please call with questions or contact us using [employer].    Sincerely,        Electronically signed by Shanthi Danielle MD

## 2021-06-20 NOTE — PROGRESS NOTES
ASSESSMENT:  1. Benign essential hypertension  hydroCHLOROthiazide (HYDRODIURIL) 25 MG tablet   2. Pure hypercholesterolemia  simvastatin (ZOCOR) 40 MG tablet       PLAN:  Patient up to date on labs for medication management. Filled medications today for 6 months, would be due for labs again at that time.     No problem-specific Assessment & Plan notes found for this encounter.      There are no Patient Instructions on file for this visit.    No orders of the defined types were placed in this encounter.    Medications Discontinued During This Encounter   Medication Reason     hydroCHLOROthiazide (HYDRODIURIL) 25 MG tablet Reorder     simvastatin (ZOCOR) 40 MG tablet Reorder       No Follow-up on file.    CHIEF COMPLAINT:  Chief Complaint   Patient presents with     Medication Management     Medication Refill       HISTORY OF PRESENT ILLNESS:  Crista is a 83 y.o. female here today for medication check. She states she was told to come in but as far as I can tell she is up to date on labs. There is no telephone encounter for an appointment. BP and cholesterol are controlled well on current regimen. No changes today.     REVIEW OF SYSTEMS:      Pertinent items are noted in HPI.  All other systems are negative  PFSH:  Reviewed, no changes      TOBACCO USE:  History   Smoking Status     Former Smoker     Quit date: 1/22/2007   Smokeless Tobacco     Never Used       VITALS:  Vitals:    09/18/18 1307   BP: 138/72   Patient Site: Left Arm   Patient Position: Sitting   Cuff Size: Adult Regular   Pulse: 64   Resp: 14   SpO2: 95%   Weight: 125 lb 6 oz (56.9 kg)     Wt Readings from Last 3 Encounters:   09/18/18 125 lb 6 oz (56.9 kg)   04/02/18 127 lb 12.8 oz (58 kg)   03/23/17 135 lb (61.2 kg)       PHYSICAL EXAM:   /72 (Patient Site: Left Arm, Patient Position: Sitting, Cuff Size: Adult Regular)  Pulse 64  Resp 14  Wt 125 lb 6 oz (56.9 kg)  SpO2 95%  BMI 22.57 kg/m2  General appearance: alert, appears stated age  and cooperative  Lungs: clear to auscultation bilaterally  Heart: regular rate and rhythm, S1, S2 normal, no murmur, click, rub or gallop  Extremities: extremities normal, atraumatic, no cyanosis or edema  Pulses: 2+ and symmetric  Skin: Skin color, texture, turgor normal. No rashes or lesions  Neurologic: Grossly normal  Psychologic: Mood and affect normal.      DATA REVIEWED:  Additional History from Old Records Summarized (2): reviewed last 2 notes, telephone encounters  Decision to Obtain Records (1): 0  Radiology Tests Summarized or Ordered (1): 0  Labs Reviewed or Ordered (1): 0  Medicine Test Summarized or Ordered (1): 0  Independent Review of EKG or X-RAY(2 each): 0    The visit lasted a total of 15  minutes face to face with the patient. Over 50% of the time was spent counseling and educating the patient about plan of care.    MEDICATIONS:  Current Outpatient Prescriptions   Medication Sig Dispense Refill     aspirin 81 MG EC tablet Take 1 tablet (81 mg total) by mouth daily. 150 tablet 2     hydroCHLOROthiazide (HYDRODIURIL) 25 MG tablet TAKE 1 TABLET(25 MG) BY MOUTH DAILY 90 tablet 1     MULTIVITAMIN ORAL Take 1 capsule by mouth daily.       omega-3 fatty acids-fish oil (OMEGA 3 FISH OIL) 684-1,200 mg CpDR Take 1 capsule by mouth daily.       simvastatin (ZOCOR) 40 MG tablet TAKE 1 TABLET(40 MG) BY MOUTH AT BEDTIME 90 tablet 1     VITAMIN A/VITAMIN D3 (NATURAL VITAMIN D ORAL) Take 1,000 Units by mouth daily.        No current facility-administered medications for this visit.        This note has been dictated using voice recognition software. Any grammatical or context distortions are unintentional and inherent to the software

## 2021-06-21 NOTE — LETTER
Letter by Ryann Hodge DO at      Author: Ryann Hodge DO Service: -- Author Type: --    Filed:  Encounter Date: 2021 Status: (Other)         Crista Harman  [address] [facility] [address]      May 24, 2021      Dear Crista Harman,   : 1935      This letter is in regards to the appointment that you had scheduled on 21 at the [facility] with Dr. Hodge.     The [facility] strives to see all patients in a timely manner and we need your help to achieve this.  The above-mentioned appointment was missed and we do not have record of a cancellation by you.  Whenever possible, we request appointment cancellations at least 72 hours in advance.  This time allows us to offer the appointment to another patient in need.      If you feel you have received this letter in error, or if you need to reschedule this appointment, please call our office so that we may update our records.      Sincerely,    [facility]

## 2021-06-21 NOTE — LETTER
Letter by Ryann Hodge DO at      Author: Ryann Hodge DO Service: -- Author Type: --    Filed:  Encounter Date: 4/23/2021 Status: (Other)         Crista Harman  [address] [facility] [address]             April 23, 2021         Dear Ms. Harman,    Below are the results from your recent visit:    Resulted Orders   Basic Metabolic Panel   Result Value Ref Range    Sodium 142 136 - 145 mmol/L    Potassium 4.1 3.5 - 5.0 mmol/L    Chloride 103 98 - 107 mmol/L    CO2 26 22 - 31 mmol/L    Anion Gap, Calculation 13 5 - 18 mmol/L    Glucose 92 70 - 125 mg/dL    Calcium 9.4 8.5 - 10.5 mg/dL    BUN 11 8 - 28 mg/dL    Creatinine 0.82 0.60 - 1.10 mg/dL    GFR MDRD Af Amer >60 >60 mL/min/1.73m2    GFR MDRD Non Af Amer >60 >60 mL/min/1.73m2    Narrative    Fasting Glucose reference range is 70-99 mg/dL per  American Diabetes Association (ADA) guidelines.   HM2(CBC w/o Differential)   Result Value Ref Range    WBC 6.5 4.0 - 11.0 thou/uL    RBC 3.58 (L) 3.80 - 5.40 mill/uL    Hemoglobin 11.2 (L) 12.0 - 16.0 g/dL    Hematocrit 33.9 (L) 35.0 - 47.0 %    MCV 95 80 - 100 fL    MCH 31.3 27.0 - 34.0 pg    MCHC 33.0 32.0 - 36.0 g/dL    RDW 18.3 (H) 11.0 - 14.5 %    Platelets 250 140 - 440 thou/uL    MPV 10.1 (H) 7.0 - 10.0 fL   Iron and Transferrin Iron Binding Capacity   Result Value Ref Range    Iron 70 42 - 175 ug/dL    Transferrin 229 212 - 360 mg/dL    Transferrin Saturation, Calculated 24 20 - 50 %    Transferrin IBC, Calculated 286 (L) 313 - 563 ug/dL   Ferritin   Result Value Ref Range    Ferritin 258 (H) 10 - 130 ng/mL       As discussed over the phone with daughter-in-law Pepper all labs look great.  There is no worsening of hemoglobin level and no evidence of iron or B12 deficiency.  All other electrolytes normal.    Please call with questions or contact us using [employer].    Sincerely,        Electronically signed by Ryann Hodge,

## 2021-06-21 NOTE — LETTER
Letter by Alva Zimmer MD at      Author: Alva Zimmer MD Service: -- Author Type: --    Filed:  Encounter Date: 3/9/2021 Status: (Other)         Crista Harman  [address] [facility] [address]             March 9, 2021         Dear Ms. Harman,    Below are the results from your recent visit:    Resulted Orders   Vitamin B12   Result Value Ref Range    Vitamin B-12 862 (H) 213 - 816 pg/mL   Thyroid Cascade   Result Value Ref Range    TSH 1.34 0.30 - 5.00 uIU/mL   Comprehensive Metabolic Panel   Result Value Ref Range    Sodium 143 136 - 145 mmol/L    Potassium 3.3 (L) 3.5 - 5.0 mmol/L    Chloride 100 98 - 107 mmol/L    CO2 29 22 - 31 mmol/L    Anion Gap, Calculation 14 5 - 18 mmol/L    Glucose 128 (H) 70 - 125 mg/dL    BUN 11 8 - 28 mg/dL    Creatinine 0.83 0.60 - 1.10 mg/dL    GFR MDRD Af Amer >60 >60 mL/min/1.73m2    GFR MDRD Non Af Amer >60 >60 mL/min/1.73m2    Bilirubin, Total 0.5 0.0 - 1.0 mg/dL    Calcium 10.1 8.5 - 10.5 mg/dL    Protein, Total 7.0 6.0 - 8.0 g/dL    Albumin 4.3 3.5 - 5.0 g/dL    Alkaline Phosphatase 39 (L) 45 - 120 U/L    AST 30 0 - 40 U/L    ALT 22 0 - 45 U/L    Narrative    Fasting Glucose reference range is 70-99 mg/dL per  American Diabetes Association (ADA) guidelines.   Folate, Serum   Result Value Ref Range    Folate 17.3 >=3.5 ng/mL   Syphilis Screen, Cascade   Result Value Ref Range    Treponema Antibody (Syphilis) Negative Negative   HM1 (CBC with Diff)   Result Value Ref Range    WBC 5.8 4.0 - 11.0 thou/uL    RBC 3.81 3.80 - 5.40 mill/uL    Hemoglobin 12.0 12.0 - 16.0 g/dL    Hematocrit 35.6 35.0 - 47.0 %    MCV 93 80 - 100 fL    MCH 31.5 27.0 - 34.0 pg    MCHC 33.7 32.0 - 36.0 g/dL    RDW 17.7 (H) 11.0 - 14.5 %    Platelets 239 140 - 440 thou/uL    MPV 10.2 (H) 7.0 - 10.0 fL    Neutrophils % 60 50 - 70 %    Lymphocytes % 26 20 - 40 %    Monocytes % 12 (H) 2 - 10 %    Eosinophils % 2 0 - 6 %    Basophils % 1 0 - 2 %    Immature Granulocyte % 0 <=0 %     Neutrophils Absolute 3.5 2.0 - 7.7 thou/uL    Lymphocytes Absolute 1.5 0.8 - 4.4 thou/uL    Monocytes Absolute 0.7 0.0 - 0.9 thou/uL    Eosinophils Absolute 0.1 0.0 - 0.4 thou/uL    Basophils Absolute 0.1 0.0 - 0.2 thou/uL    Immature Granulocyte Absolute 0.0 <=0.0 thou/uL       Most labs are fairly normal except potassium that is mildly low.  It has been off and on low in the past.  Encourage foods rich in potassium.  I can also send a potassium supplement pill if patient is willing to take medication.       Please call with questions or contact us using [email or web address].    Sincerely,        Electronically signed by Alva Zimmer MD

## 2021-06-24 NOTE — TELEPHONE ENCOUNTER
Who is calling:   Patient   Reason for Call:   Checking on the status of the medication that has not been received  Date of last appointment with primary care:  9/18/2018  Okay to leave a detailed message: Yes

## 2021-06-24 NOTE — TELEPHONE ENCOUNTER
Patient checking on refill status.   She was in office in September for medication management/review.

## 2021-06-24 NOTE — TELEPHONE ENCOUNTER
Pt calling requesting a refill for   Hydrochlorothiazide and simvastatin.  She has 12 days of medication left.  Jill Gutierrez RN, [facility] RN Triage/Med Refills

## 2021-06-25 NOTE — TELEPHONE ENCOUNTER
Arranged for patient to follow-up with [facility] on 6/10/21. -ejb    ----- Message from CONNIE Wesley sent at 6/9/2021  1:16 PM CDT -----  Regarding: [facility] PATIENT  General phone call:    Caller: PATI[place] DAUGHTER [place]    Primary cardiologist: [place]    Detailed reason for call: DAUGHTER WOULD LIKE A CALL BACK, RE: PATIENTS CHEST PAIN AND SOME DIRECTION ON HOW MUCH NTG SHE CAN TAKE. ALSO WOULD LIKE RESULTS OF  HOLTER MONITOR.     Best phone number: [phone]    Best time to contact: ANY    Ok to leave a detailed message? YES    Device? NO    Additional Info:

## 2021-06-25 NOTE — TELEPHONE ENCOUNTER
----- Message from Ryann Hodge DO sent at 6/15/2021  7:22 AM CDT -----  Please call daughter in law Pepper in the chart and let her know potassium is normal and can start the medication for memory we discussed

## 2021-06-26 NOTE — PATIENT INSTRUCTIONS - HE
Discontinue Hydrochlorthiazide    Take two Imdur tablets at 5-6 PM    Start Prilosec 1 tablet twice a day    Start potassium supplement 1 tablet twice a day    If chest pains persist contact my nurse Lili to update    Keep your July 6 appointment    Continue with sublingual nitroglycerin tablet use if chest pains occur at night

## 2021-06-30 NOTE — PROGRESS NOTES
Progress Notes by Narinder Ryan MD at 4/15/2021 10:50 AM     Author: Narinder Ryan MD Service: -- Author Type: Physician    Filed: 4/15/2021 11:38 AM Encounter Date: 4/15/2021 Status: Signed    : Narinder Ryan MD (Physician)           Ms. Crista Harman is referred by Dr. Ryan to the [facility] team to evaluate recent admission in March.      Assessment/Recommendations   Assessment:    1.  Cognitive impairment and metabolic encephalopathy  2.  Coronary artery disease with severe right coronary stenosis.    Plan:  1.  Medical management for ischemic heart disease with continuation of Imdur 30 mg daily metoprolol XL 25 mg daily  2.  Antilipemic therapy with Lipitor 80 mg daily.  3.  Plavix 75 aspirin 81 in view of acute presentation.  She technically really did not have myocardial damage or injury.  At this point I think we can discontinue Plavix therapy but she should maintain aspirin 81 mg daily.       History of Present Illness/Subjective    Ms. Crista Harman is a 85 y.o. female with distant history of coronary artery disease.  Had a coronary stent placed in 2007.  In March of this year came to the emergency room with chest discomfort.  ECG showing severe hypokalemia and ST segment depressions.  Potassium 2.6 was recorded.  Patient with a significant cognitive disorder memory loss.  Underwent urgent angiography which showed previous stent to the right coronary with a high-grade ostial stenosis of the right coronary.  Attempt was made to see if this could be intervened on and although wire was able to be passed through the vessel was unable to cross with.  Left coronary has mild grade lesions identified but no severe lesion noted.  Despite presentation and attempted management troponin peak was 0.32 which is nonsignificant finding  Since released from the hospital she has not had any further episodes of chest pain pressure tightness or heaviness.  Engages with regular  walking and tolerates activity well.  No dizziness lightheadedness or dyspnea.    ECHOCARDIOGRAM: March 2021.  Left ventricular function is normal ejection fraction normal.  Mild aortic valve sclerosis but no stenosis.  Mild pulmonary hypertension suggested.     Physical Examination Review of Systems   Vitals:    04/15/21 1105   BP: 130/48   Pulse: (!) 47   Resp: 16   SpO2: 98%     Body mass index is 18.19 kg/m .  Wt Readings from Last 5 Encounters:   04/15/21 106 lb (48.1 kg)   04/13/21 105 lb 12.8 oz (48 kg)   03/30/21 103 lb 8 oz (46.9 kg)   03/05/21 105 lb 6.4 oz (47.8 kg)   09/30/20 102 lb 9.6 oz (46.5 kg)     BP Readings from Last 5 Encounters:   04/15/21 130/48   04/13/21 130/64   04/02/21 172/75   03/05/21 128/59   09/30/20 138/68     Pulse Readings from Last 5 Encounters:   04/15/21 (!) 47   04/13/21 (!) 47   04/02/21 (!) 54   03/05/21 68   09/30/20 70       General:   Alert, appears stated age and cooperative  normocephalic, without obvious abnormality   ENT/Mouth: Membranes moist, no oral lesions or bleeding gums.      EYES:  No scleral icterus, normal conjunctivae   Neck: No carotid bruits or thyromegaly   Chest/Lungs:   Lungs are clear to auscultation, no rales or wheezing,    Cardiovascular:   Regular. Normal first and second heart sounds with   no murmurs, rubs, or gallops; No edema bilaterally    Abdomen:  Normal bowel tones   Extremities: No cyanosis or clubbing, pulses intact   Skin: Color, texture normal.    Neurologic: No focal neurologic deficits          General: WNL  Eyes: WNL  Ears/Nose/Throat: WNL  Lungs: WNL  Heart: Shortness of Breath with activity  Stomach: WNL  Bladder: WNL  Muscle/Joints: WNL  Skin: WNL  Nervous System: Falls  Mental Health: Confusion, Anxiety, Depression     Blood: Easy Bruising     Medical History  Surgical History Family History Social History   Past Medical History:   Diagnosis Date   ? Acute non-ST elevation myocardial infarction (NSTEMI) (H) 3/28/2021   ? CAD  (coronary artery disease)    ? Coronary artery disease due to lipid rich plaque 2012   ? Dyslipidemia, goal LDL below 70 2012   ? Essential hypertension 2012   ? History of heart attack    ? hx C diff 2015   ? Hyperlipidemia    ? Hypertension    ? Peripheral vascular disease (H)     Past Surgical History:   Procedure Laterality Date   ? APPENDECTOMY     ? BACK SURGERY     ? CORONARY STENT PLACEMENT      2    ? CV CORONARY ANGIOGRAM N/A 3/28/2021    Procedure: Coronary Angiogram;  Surgeon: Narinder Ryan MD;  Location: [facility] Cardiac Cath Lab;  Service: Cardiology   ? CV LEFT HEART CATHETERIZATION WITH LEFT VENTRICULOGRAM N/A 3/28/2021    Procedure: Left Heart Catheterization with Left Ventriculogram;  Surgeon: Narinder Ryan MD;  Location: [facility] Cardiac Cath Lab;  Service: Cardiology   ? OOPHORECTOMY     ? VASCULAR SURGERY      stents legs     Family History   Problem Relation Age of Onset   ? Kidney disease Mother    ? Heart disease Father    ? Heart disease Brother    ? Hypertension Brother     Social History     Socioeconomic History   ? Marital status:      Spouse name: Not on file   ? Number of children: 2   ? Years of education: Not on file   ? Highest education level: Not on file   Occupational History     Employer: RETIRED   Social Needs   ? Financial resource strain: Not on file   ? Food insecurity     Worry: Not on file     Inability: Not on file   ? Transportation needs     Medical: Not on file     Non-medical: Not on file   Tobacco Use   ? Smoking status: Former Smoker     Types: Cigarettes     Quit date: 2007     Years since quittin.2   ? Smokeless tobacco: Never Used   Substance and Sexual Activity   ? Alcohol use: Yes     Frequency: 2-4 times a month     Drinks per session: 1 or 2     Binge frequency: Never     Comment: occasional beer or bloody randall   ? Drug use: No   ? Sexual activity: Not on file   Lifestyle   ? Physical activity     Days  per week: Not on file     Minutes per session: Not on file   ? Stress: Not on file   Relationships   ? Social connections     Talks on phone: Not on file     Gets together: Not on file     Attends Latter-day service: Not on file     Active member of club or organization: Not on file     Attends meetings of clubs or organizations: Not on file     Relationship status: Not on file   ? Intimate partner violence     Fear of current or ex partner: Not on file     Emotionally abused: Not on file     Physically abused: Not on file     Forced sexual activity: Not on file   Other Topics Concern   ? Not on file   Social History Narrative    She lives by herself with her dog.  She has neighbors around.  Her son and family live 10 minutes away.        Alva Zimmer MD  6/25/2019              Medications  Allergies   Current Outpatient Medications   Medication Sig Dispense Refill   ? aspirin 81 MG EC tablet Take 1 tablet (81 mg total) by mouth daily. 150 tablet 2   ? atorvastatin (LIPITOR) 80 MG tablet Take 1 tablet (80 mg total) by mouth daily. For cholesterol 90 tablet 4   ? clopidogreL (PLAVIX) 75 mg tablet Take 1 tablet (75 mg total) by mouth daily. Dose to start tomorrow 90 tablet 3   ? escitalopram oxalate (LEXAPRO) 5 MG tablet Take 1 tablet (5 mg total) by mouth daily. 30 tablet 6   ? hydroCHLOROthiazide (HYDRODIURIL) 25 MG tablet Take 1 tablet (25 mg total) by mouth daily. 90 tablet 3   ? isosorbide mononitrate (IMDUR) 30 MG 24 hr tablet Take 1 tablet (30 mg total) by mouth daily. 30 tablet 0   ? metoprolol succinate (TOPROL-XL) 25 MG Take 0.5 tablets (12.5 mg total) by mouth daily. 15 tablet 0   ? nitroglycerin (NITROSTAT) 0.4 MG SL tablet Place 1 tablet (0.4 mg total) under the tongue every 5 (five) minutes as needed for chest pain. 1 Bottle 0   ? MULTIVITAMIN ORAL Take 1 capsule by mouth daily.     ? omega-3 fatty acids-fish oil (OMEGA 3 FISH OIL) 684-1,200 mg CpDR Take 1 capsule by mouth daily.     ? VITAMIN  A/VITAMIN D3 (NATURAL VITAMIN D ORAL) Take 1,000 Units by mouth daily.        No current facility-administered medications for this visit.     Allergies   Allergen Reactions   ? Nickel Rash         Lab Results    Chemistry/lipid CBC Cardiac Enzymes/BNP/TSH/INR   Lab Results   Component Value Date    CHOL 148 03/28/2021    HDL 55 03/28/2021    LDLCALC 82 03/28/2021    TRIG 57 03/28/2021    CREATININE 0.82 04/13/2021    BUN 11 04/13/2021    K 4.1 04/13/2021     04/13/2021     04/13/2021    CO2 26 04/13/2021    Lab Results   Component Value Date    WBC 6.5 04/13/2021    HGB 11.2 (L) 04/13/2021    HCT 33.9 (L) 04/13/2021    MCV 95 04/13/2021     04/13/2021    Lab Results   Component Value Date    TROPONINI 0.11 03/30/2021    TSH 1.34 03/05/2021    INR 1.03 03/27/2021         Clinical evaluation time today including exam 25 minutes.  At least 50% of clinic evaluation time involved in assessment and patient counseling.  Part of this chart was created using a dictation software.  Typographic errors, word substitutions, and grammatical errors may unintentionally occur.    Narinder Ryan M.D.  [facility]

## 2021-07-03 NOTE — ADDENDUM NOTE
Addendum Note by Carlos Alberto Villalba DO at 3/23/2017  3:39 PM     Author: Carlos Alberto Villalba DO Service: -- Author Type: Physician    Filed: 3/23/2017  3:39 PM Encounter Date: 3/23/2017 Status: Signed    : Carlos Alberto Villalba DO (Physician)    Addended by: CARLOS ALBERTO VILLALBA on: 3/23/2017 03:39 PM        Modules accepted: Orders

## 2021-07-03 NOTE — ADDENDUM NOTE
Addendum Note by Bandar Stewart RT (R) at 3/23/2017  4:24 PM     Author: Bandar Stewart RT (R) Service: -- Author Type: Radiologic Technologist    Filed: 3/23/2017  4:24 PM Encounter Date: 3/23/2017 Status: Signed    : Bandar Stewart RT (R) (Radiologic Technologist)    Addended by: BANDAR STEWART on: 3/23/2017 04:24 PM        Modules accepted: Orders

## 2021-07-04 ENCOUNTER — HOSPITAL ENCOUNTER (EMERGENCY)
Dept: EMERGENCY MEDICINE | Facility: HOSPITAL | Age: 86
Discharge: HOME | End: 2021-07-04
Attending: EMERGENCY MEDICINE
Payer: COMMERCIAL

## 2021-07-04 DIAGNOSIS — S43.401A SPRAIN OF RIGHT SHOULDER, UNSPECIFIED SHOULDER SPRAIN TYPE, INITIAL ENCOUNTER: ICD-10-CM

## 2021-07-04 NOTE — PROGRESS NOTES
Progress Notes by Narinder Ryan MD at 6/10/2021  9:30 AM     Author: Narinder Ryan MD Service: -- Author Type: Physician    Filed: 6/10/2021 12:31 PM Encounter Date: 6/10/2021 Status: Signed    : Narinder Ryan MD (Physician)         Cardiology Progress Note        Assessment:  Chronic chest pain episode and nocturnal.  Not entirely certain if this is angina.  I do suspect that ischemia is a component given that she has relatively preserved inferior wall function and critical lesions of the right coronary artery that we could not cross.  she appears to be a high risk candidate for surgery.  We can review whether or not to attempt another intervention possibly with rotablation and see if that might improve her chances of improving flow in the right coronary.  But the unusual character of the symptom only occurring at night and not with exertion seems to argue against this being entirely anginal.  It is possible that her morning dose of nitrates wear off and she is susceptible to ischemia in the evening ectopy 1 component.  On the other hand she may have hyperacidity esophageal reflux particularly after her p.m. meal and this provokes esophageal spasm which of course may also respond to nitrates.    Recommendations:  With regards to her hypokalemia Daria start her on additional supplementation with potassium 10 mEq twice a day check potassium magnesium today and stop hydrochlorothiazide    I am going to have her take her Imdur 60 mg dose in the evening before she goes to sleep and see if this improves her chest pains.  Continue to use sublingual Nitrostat tablets.  Will rereview her angiogram.  I am in a put her on a more aggressive antiacid management in the event that this could be gastric.  She will take Mylanta 30 cc twice a day.  Will take omeprazole twice a day.  Hopefully this may have some favorable impact.  She has a July 6 appointment already scheduled continue with that.  Will review  with  more aggressive interventional strategy.    Subjective:  Interval History:   Patient seen in consultation on April 15 of this year.  At that time the patient had presented with a known history of coronary disease a very distant history of coronary stent placement and recent evaluation in the ER with chest pain.  Patient was found to have severe hypokalemia.  Underwent emergent angiography which showed the previous RCA stent to have a high-grade ostial stenosis.  Despite multiple attempts unable to cross the lesion with a guidewire.  On echo left ventricular function was normal.  At that point medical management was advised on continuation of Imdur 30 metoprolol XL 25 and Plavix 75 mg daily.  Patient readmitted in mid May of this year with chest pain event.  Stress nuclear scan scan was performed that showed mild ischemia in the distal apical inferior wall with an EF of 66%.  24-hour Holter monitor showed bradycardia during the night with low heart rate of 36 at 1 point.  Longest pause was 2 seconds.  Patient returned to the ER with chest pain episode on June 7.  Unfortunately no beds were available and the patient had to be referred out.  According to their notes she did bump her troponins 2.3 but she was released the following morning.    She continued to have almost daily episodes of chest discomfort.  Pressure sharp pain in the center of the chest.  What is unusual is that it only occurs in the evening.  Usually around 9:00 at night.  Almost always when she is laying down to go to sleep.  During the day she can be exerting herself such as walking outdoors sweeping the house or other modest activities and she never has chest pain or pressure.  Its only in the evening and generally when recumbent  Current Outpatient Medications   Medication Sig Dispense Refill   ? aspirin 81 MG EC tablet Take 1 tablet (81 mg total) by mouth daily. 150 tablet 2   ? atorvastatin (LIPITOR) 80 MG tablet Take 1 tablet  (80 mg total) by mouth daily. For cholesterol 90 tablet 4   ? clopidogreL (PLAVIX) 75 mg tablet Take 75 mg by mouth daily.     ? escitalopram oxalate (LEXAPRO) 10 MG tablet Take 1 tablet (10 mg total) by mouth daily. 30 tablet 3   ? hydroCHLOROthiazide (HYDRODIURIL) 25 MG tablet Take 1 tablet (25 mg total) by mouth daily. 90 tablet 3   ? isosorbide mononitrate (IMDUR) 30 MG 24 hr tablet Take 2 tablets (60 mg total) by mouth daily. 90 tablet 3   ? melatonin 5 mg Tab tablet Take 5 mg by mouth at bedtime as needed.     ? nitroglycerin (NITROSTAT) 0.4 MG SL tablet Place 1 tablet (0.4 mg total) under the tongue every 5 (five) minutes as needed for chest pain. 1 Bottle 0   ? QUEtiapine (SEROQUEL) 25 MG tablet Take 1/2 to 1 tablet every evening around dinner 30 tablet 0   ? MULTIVITAMIN ORAL Take 1 capsule by mouth daily.     ? VITAMIN A/VITAMIN D3 (NATURAL VITAMIN D ORAL) Take 1,000 Units by mouth daily.        No current facility-administered medications for this visit.          Objective:   Vital signs in last 24 hours:  [unfilled]  Weight:   101 lb (45.8 kg)     Review of Systems:  Pertinent items are noted in HPI.  A 12 point comprehensive review of systems was negative except as noted.   Family history not pertinent to chief complaint or presenting problem  Physical Exam:  Vitals:    06/10/21 0932   BP: 132/62   Pulse: 60   Resp: 16     Head and neck without focal cranial neurologic defects.  JVD not distended.  Carotid upstroke normal without bruit.  External eye exam normal without icterus.  External ear exam normal.  Neck without cervical lymphadenopathy or thyromegaly.  /62 (Patient Site: Left Arm, Patient Position: Sitting, Cuff Size: Adult Small)   Pulse 60   Resp 16   Wt 101 lb (45.8 kg)   BMI 17.34 kg/m    General appearance: alert, appears stated age and cooperative  Lungs: clear to auscultation bilaterally  Heart: regular rate and rhythm, S1, S2 normal, no murmur, click, rub or gallop   Abdomen  with normal bowel tones.  Skin without rash, ecchymosis, lesions.  Neuromuscular tone normal.  Peripheral pulse intact and equal.  Joints without swelling or erythema.    Wt Readings from Last 3 Encounters:   06/10/21 101 lb (45.8 kg)   06/07/21 106 lb (48.1 kg)   05/17/21 101 lb 11.2 oz (46.1 kg)     Temp Readings from Last 3 Encounters:   06/07/21 97.7  F (36.5  C) (Oral)   05/17/21 98.2  F (36.8  C) (Oral)   04/13/21 96.8  F (36  C) (Oral)     BP Readings from Last 3 Encounters:   06/10/21 132/62   06/07/21 108/58   05/17/21 103/57     Pulse Readings from Last 3 Encounters:   06/10/21 60   06/07/21 (!) 46   05/17/21 (!) 51     16  @[email or web address](3)@  @[facility]@    Cardiographics:     ECG: All ECGs were personally reviewed and noted.no prior ECG  Echocardiogram: findings as noted    Lab Results:   Lab results personally reviewed.    Lab Results   Component Value Date     06/07/2021    K 2.6 (LL) 06/07/2021    CL 99 06/07/2021    CO2 30 06/07/2021    BUN 11 06/07/2021    CREATININE 0.94 06/07/2021    CALCIUM 10.0 06/07/2021     INR/Prothrombin Time:   Lab Results   Component Value Date    INR 1.02 05/16/2021    INR 1.03 03/27/2021    INR 0.95 08/25/2011       Current Outpatient Medications:   ?  aspirin 81 MG EC tablet, Take 1 tablet (81 mg total) by mouth daily., Disp: 150 tablet, Rfl: 2  ?  atorvastatin (LIPITOR) 80 MG tablet, Take 1 tablet (80 mg total) by mouth daily. For cholesterol, Disp: 90 tablet, Rfl: 4  ?  clopidogreL (PLAVIX) 75 mg tablet, Take 75 mg by mouth daily., Disp: , Rfl:   ?  escitalopram oxalate (LEXAPRO) 10 MG tablet, Take 1 tablet (10 mg total) by mouth daily., Disp: 30 tablet, Rfl: 3  ?  hydroCHLOROthiazide (HYDRODIURIL) 25 MG tablet, Take 1 tablet (25 mg total) by mouth daily., Disp: 90 tablet, Rfl: 3  ?  isosorbide mononitrate (IMDUR) 30 MG 24 hr tablet, Take 2 tablets (60 mg total) by mouth daily., Disp: 90 tablet, Rfl: 3  ?  melatonin 5 mg Tab tablet, Take 5 mg by mouth at bedtime  as needed., Disp: , Rfl:   ?  nitroglycerin (NITROSTAT) 0.4 MG SL tablet, Place 1 tablet (0.4 mg total) under the tongue every 5 (five) minutes as needed for chest pain., Disp: 1 Bottle, Rfl: 0  ?  QUEtiapine (SEROQUEL) 25 MG tablet, Take 1/2 to 1 tablet every evening around dinner, Disp: 30 tablet, Rfl: 0  ?  MULTIVITAMIN ORAL, Take 1 capsule by mouth daily., Disp: , Rfl:   ?  VITAMIN A/VITAMIN D3 (NATURAL VITAMIN D ORAL), Take 1,000 Units by mouth daily. , Disp: , Rfl:   Time spent in clinical evaluation including counseling was 60 minutes.  Part of this chart was created using a dictation software.  Typographic errors, word substitutions, and Grammatical errors may unintentionally occur.

## 2021-07-05 NOTE — ED PROVIDER NOTES
ED Provider Notes by Prince León MD at 7/4/2021  9:02 PM     Author: Prince León MD Service: -- Author Type: Physician    Filed: 7/4/2021  9:43 PM Date of Service: 7/4/2021  9:02 PM Status: Signed    : Prince León MD (Physician)       EMERGENCY DEPARTMENT ENCOUNTER      NAME: Crista Harman  AGE: 86 y.o. female  YOB: 1935  MRN: [id number]  EVALUATION DATE & TIME: 7/4/2021  8:50 PM    PCP: Ryann Hodge DO    ED PROVIDER: LANIE León    Chief Complaint   Patient presents with   ? Fall   ? Shoulder Injury     FINAL IMPRESSION:  1. Sprain of right shoulder, unspecified shoulder sprain type, initial encounter        ED COURSE & MEDICAL DECISION MAKING:    Pertinent Labs & Imaging studies reviewed. (See chart for details)  86 y.o. female presents to the Emergency Department for evaluation of a shoulder injury.   Patient was pulled over by a dog leash landing on the top of her shoulder.  Denied striking her head.  She had isolated pain to the superior and anterior aspect of the shoulder.  Worse with any attempts at range of motion.  Denies headache denies neck pain.  Is not on anticoagulation.  No chest pain or shortness of breath or rib pain.  Denies other trauma or pain.  On clinical examination she had pain with any attempted passive or active range of motion of the shoulder.  The rest of the humerus the rest of the upper extremity on examination was unremarkable with distal neurovascular intact and no other bony tenderness.  She did interestingly have a contusion on the outer aspect of the right orbit relatively small in size.  In discussion with the patient and her son apparently this was noted earlier today prior to the origination would suggest potentially an old injury or that the patient does not recall it.  Could potentially be related to the fall today other the patient states she did not hit her head.  She has no headache she has no neck pain she had no  tenderness to the area and given these clinical exam findings and history as well as lack of any blood thinning medication I do not feel that CT scan imaging of that area is indicated.  She went on to have x-ray of her shoulder which demonstrated a subtle irregularity to the right glenoid that per radiology is favored to be degenerative but could not exclude a nondisplaced fracture.  I discussed this with the patient.  Offered CT scan imaging for further assessment to definitively answer question if there was a fracture there she declined at wanting to be more conservative initially and I think that is reasonable as she is improved after administration of Tylenol ibuprofen.  Her overall plan of care at this point is for sling to assist with her discomfort and immobilization.  Over-the-counter pain management.  I recommended follow-up with orthopedics later this week.  Reviewed return precautions prior to discharge patient was comfortable this plan of care.    8:55PM I performed my initial history and physical exam as well as discussed ED course and plan.      Plan and all results were discussed  Time was taken to answer all questions. Patient and/or associated parties expressed understanding and were agreeable to the treatment plan.  Warning signs and ER return precautions provided. Discharged with discharge instructions outlining plan for further care and follow up.       MEDICATIONS GIVEN IN THE EMERGENCY:  Medications   acetaminophen tablet 1,000 mg (TYLENOL) (1,000 mg Oral Given 7/4/21 2110)   ibuprofen tablet 400 mg (ADVIL,MOTRIN) (400 mg Oral Given 7/4/21 2110)       NEW PRESCRIPTIONS STARTED AT TODAY'S ER VISIT  Current Discharge Medication List      CONTINUE these medications which have NOT CHANGED    Details   aluminum-magnesium hydroxide-simethicone (MAALOX MAX STRENGTH) 400-400-40 mg/5 mL suspension Take 5 mL by mouth 2 (two) times a day at 9am and 6pm.  Refills: 0    Associated Diagnoses: Chest pain,  unspecified type      aspirin 81 MG EC tablet Take 1 tablet (81 mg total) by mouth daily.  Qty: 150 tablet, Refills: 2      atorvastatin (LIPITOR) 80 MG tablet Take 1 tablet (80 mg total) by mouth daily. For cholesterol  Qty: 90 tablet, Refills: 4    Comments: Store on file. Discontinue simvastatin  Associated Diagnoses: Abnormal electrocardiogram      cholecalciferol, vitamin D3, 50 mcg (2,000 unit) capsule Take 1 capsule (2,000 Units total) by mouth daily.  Qty: 90 capsule, Refills: 4    Associated Diagnoses: Vitamin D deficiency      clopidogreL (PLAVIX) 75 mg tablet Take 75 mg by mouth daily.      donepeziL (ARICEPT) 5 MG tablet Take 1 tablet (5 mg total) by mouth at bedtime.  Qty: 30 tablet, Refills: 5    Associated Diagnoses: Memory deficits      escitalopram oxalate (LEXAPRO) 10 MG tablet Take 1 tablet (10 mg total) by mouth daily.  Qty: 90 tablet, Refills: 3    Associated Diagnoses: Memory deficits      isosorbide mononitrate (IMDUR) 30 MG 24 hr tablet Take 2 tablets (60 mg total) by mouth at bedtime.  Qty: 90 tablet, Refills: 3    Associated Diagnoses: Coronary artery disease due to lipid rich plaque      melatonin 5 mg Tab tablet Take 5 mg by mouth at bedtime as needed.      nitroglycerin (NITROSTAT) 0.4 MG SL tablet Place 1 tablet (0.4 mg total) under the tongue every 5 (five) minutes as needed for chest pain.  Qty: 1 Bottle, Refills: 0    Associated Diagnoses: Abnormal electrocardiogram      omeprazole (PRILOSEC) 20 MG capsule Take 1 capsule (20 mg total) by mouth daily before breakfast.  Qty: 60 capsule, Refills: 2    Associated Diagnoses: Chest pain, unspecified type      QUEtiapine (SEROQUEL) 25 MG tablet Take 1/2 to 1 tablet every evening around dinner  Qty: 90 tablet, Refills: 1    Associated Diagnoses: Sundowning                =================================================================    HPI    Patient information was obtained from: Patient    Use of Intrepreter: N/A         Crista Harman  is a 86 y.o. female who presents to the ED with relevant history of CKD, atherosclerosis of coronary artery, essential hypertension, peripheral vascular disease, major depressive disorder, anemia, and hypokalemia via private car for evaluation of a fall and shoulder injury     Patient reports tippin jennyfer in a lawn chair this evening and landing on the top of her right shoulder. She notes that her pain in concentrated on her shoulder as she can flex her elbow but cant ambulate her shoulder. Patient also has bruising to the right of her right eye but denies any pain. She denies being on blood thinners or taking anything for pain prior to arrival .Patient denies experiencing neck pain, head pain, or any additional symptoms at this time.    REVIEW OF SYSTEMS   Review of Systems   Musculoskeletal: Positive for arthralgias (right shoulder pain).   Skin:        Bruise to right of right eye   All other systems reviewed and are negative.       PAST MEDICAL HISTORY:  Past Medical History:   Diagnosis Date   ? Acute non-ST elevation myocardial infarction (NSTEMI) (H) 3/28/2021   ? CAD (coronary artery disease)    ? Coronary artery disease due to lipid rich plaque 12/18/2012   ? Dyslipidemia, goal LDL below 70 12/18/2012   ? Essential hypertension 12/18/2012   ? History of heart attack    ? hx C diff 2015 1/22/2015   ? Hyperlipidemia    ? Hypertension    ? Peripheral vascular disease (H)        PAST SURGICAL HISTORY:  Past Surgical History:   Procedure Laterality Date   ? APPENDECTOMY     ? BACK SURGERY     ? CORONARY STENT PLACEMENT      2    ? CV CORONARY ANGIOGRAM N/A 3/28/2021    Procedure: Coronary Angiogram;  Surgeon: Narinder Ryan MD;  Location: [facility] Cardiac Cath Lab;  Service: Cardiology   ? CV LEFT HEART CATHETERIZATION WITH LEFT VENTRICULOGRAM N/A 3/28/2021    Procedure: Left Heart Catheterization with Left Ventriculogram;  Surgeon: Narinder Ryan MD;  Location: [facility] Cardiac Cath Lab;  Service:  Cardiology   ? OOPHORECTOMY     ? VASCULAR SURGERY      stents legs            CURRENT MEDICATIONS:    No current facility-administered medications on file prior to encounter.      Current Outpatient Medications on File Prior to Encounter   Medication Sig   ? aluminum-magnesium hydroxide-simethicone (MAALOX MAX STRENGTH) 400-400-40 mg/5 mL suspension Take 5 mL by mouth 2 (two) times a day at 9am and 6pm.   ? aspirin 81 MG EC tablet Take 1 tablet (81 mg total) by mouth daily.   ? atorvastatin (LIPITOR) 80 MG tablet Take 1 tablet (80 mg total) by mouth daily. For cholesterol   ? cholecalciferol, vitamin D3, 50 mcg (2,000 unit) capsule Take 1 capsule (2,000 Units total) by mouth daily.   ? clopidogreL (PLAVIX) 75 mg tablet Take 75 mg by mouth daily.   ? donepeziL (ARICEPT) 5 MG tablet Take 1 tablet (5 mg total) by mouth at bedtime.   ? escitalopram oxalate (LEXAPRO) 10 MG tablet Take 1 tablet (10 mg total) by mouth daily.   ? isosorbide mononitrate (IMDUR) 30 MG 24 hr tablet Take 2 tablets (60 mg total) by mouth at bedtime.   ? melatonin 5 mg Tab tablet Take 5 mg by mouth at bedtime as needed.   ? nitroglycerin (NITROSTAT) 0.4 MG SL tablet Place 1 tablet (0.4 mg total) under the tongue every 5 (five) minutes as needed for chest pain.   ? omeprazole (PRILOSEC) 20 MG capsule Take 1 capsule (20 mg total) by mouth daily before breakfast.   ? QUEtiapine (SEROQUEL) 25 MG tablet Take 1/2 to 1 tablet every evening around dinner       ALLERGIES:  Allergies   Allergen Reactions   ? Nickel Rash       FAMILY HISTORY:  Family History   Problem Relation Age of Onset   ? Kidney disease Mother    ? Heart disease Father    ? Heart disease Brother    ? Hypertension Brother        SOCIAL HISTORY:   Social History     Socioeconomic History   ? Marital status:      Spouse name: Not on file   ? Number of children: 2   ? Years of education: Not on file   ? Highest education level: Not on file   Occupational History     Employer:  RETIRED   Social Needs   ? Financial resource strain: Not on file   ? Food insecurity     Worry: Not on file     Inability: Not on file   ? Transportation needs     Medical: Not on file     Non-medical: Not on file   Tobacco Use   ? Smoking status: Former Smoker     Types: Cigarettes     Quit date: 2007     Years since quittin.4   ? Smokeless tobacco: Never Used   Substance and Sexual Activity   ? Alcohol use: Yes     Frequency: 2-4 times a month     Drinks per session: 1 or 2     Binge frequency: Never     Comment: occasional beer or bloody randall   ? Drug use: No   ? Sexual activity: Not on file   Lifestyle   ? Physical activity     Days per week: Not on file     Minutes per session: Not on file   ? Stress: Not on file   Relationships   ? Social connections     Talks on phone: Not on file     Gets together: Not on file     Attends Bahai service: Not on file     Active member of club or organization: Not on file     Attends meetings of clubs or organizations: Not on file     Relationship status: Not on file   ? Intimate partner violence     Fear of current or ex partner: Not on file     Emotionally abused: Not on file     Physically abused: Not on file     Forced sexual activity: Not on file   Other Topics Concern   ? Not on file   Social History Narrative    She lives by herself with her dog.  She has neighbors around.  Her son and family live 10 minutes away.        Alva Zimmer MD  2019           PHYSICAL EXAM    VITAL SIGNS: BP (!) 190/90   Pulse 70   Temp 98.4  F (36.9  C) (Oral)   Resp 20   Wt 101 lb (45.8 kg)   SpO2 98%   BMI 17.34 kg/m    Constitutional:  Well developed, well nourished, peers uncomfortable., GCS = 15   Eyes:  PERRL, conjunctiva normal, anterior chambers clear, visual fields grossly normal   HENT: There is a small roughly 2 cm contusion noted just lateral to the right orbit no appreciable soft tissue swelling no tenderness to palpation.  Otherwise unremarkable and  otherwise atraumatic head examination  Respiratory:  Normal nonlabored respirations, normal pulmonary exam, no chest wall tenderness, no bruising, swelling, abrasion.  No crepitus  Cardiovascular:  Regular rate and Rhythm, no murmur, normal capillary refill and normal distal perfusion  GI:  Soft, nondistended, nontender to palpation,  No wounds, bruising or abrasions evident, no organomegaly   :  No CVA tenderness  Musculoskeletal: Tenderness palpation of the anterior superior aspect of the right shoulder with pain with both active and passive range of motion.  Distal neurovascular intact.  No other musculoskeletal trauma evident on clinical examination.  Integument:  No abrasions, lacerations  Neurologic:  Alert, oriented, no focal motor or sensory deficit appreciated  Psych: Mood and affect normal    RADIOLOGY:  Reviewed all pertinent imaging. Please see official radiology report.  Xr Shoulder Right 2 Or More Vws    Result Date: 7/4/2021  EXAM: XR SHOULDER RIGHT 2 OR MORE VWS LOCATION: [facility] DATE/TIME: 7/4/2021 9:19 PM INDICATION: Trauma and shoulder pain on the right. COMPARISON: None.     Subtle irregularity of the glenoid is likely degenerative, though not well evaluated from positioning. If continued or significant symptoms, additional imaging could be obtained or repeated (such as CT). Otherwise, no fracture or dislocation. Bony demineralization and mild degenerative changes.        I, Farshad Andrade, am serving as a scribe to document services personally performed by Dr. León based on my observation and the provider's statements to me. I, Prince León MD attest that Farshad Andrade is acting in a scribe capacity, has observed my performance of the services and has documented them in accordance with my direction.    Prince León M.D.  Emergency Medicine  [facility] EMERGENCY  DEPARTMENT  [address]  [facility] [address]  Dept: [phone]  Loc: [phone]     Prince León MD  07/04/21 2142

## 2021-07-05 NOTE — ED TRIAGE NOTES
ED Triage Notes by Maritza Salazar RN at 7/4/2021  8:44 PM     Author: Maritza Salazar RN Service: -- Author Type: Registered Nurse    Filed: 7/4/2021  8:58 PM Date of Service: 7/4/2021  8:44 PM Status: Addendum    : Maritza Salazar RN (Registered Nurse)    Related Notes: Original Note by Martiza Salazar RN (Registered Nurse) filed at 7/4/2021  8:54 PM       Pt ambulatory to triage with her son c/o right shoulder pain after a fall today. Pt was sitting in a chair holding a dog on a leash, when the dog pulled her off the chair and she landed on her right shoulder. Pt denies LOC and denies hitting her head, though has a small area of ecchymosis to right eyebrow region. Fall witnessed by her son who confirms she did not lose consciousness.   Pt denies any other injuries, including neck or back pain. Denies anticoagulants.

## 2021-07-06 VITALS
HEART RATE: 60 BPM | SYSTOLIC BLOOD PRESSURE: 132 MMHG | WEIGHT: 101 LBS | DIASTOLIC BLOOD PRESSURE: 62 MMHG | BODY MASS INDEX: 17.34 KG/M2 | RESPIRATION RATE: 16 BRPM

## 2021-07-06 VITALS — WEIGHT: 101 LBS | BODY MASS INDEX: 17.34 KG/M2

## 2021-07-14 ENCOUNTER — TELEPHONE (OUTPATIENT)
Dept: FAMILY MEDICINE | Facility: CLINIC | Age: 86
End: 2021-07-14

## 2021-07-14 ENCOUNTER — TRANSFERRED RECORDS (OUTPATIENT)
Dept: HEALTH INFORMATION MANAGEMENT | Facility: CLINIC | Age: 86
End: 2021-07-14

## 2021-07-14 DIAGNOSIS — R41.89 COGNITIVE AND BEHAVIORAL CHANGES: Primary | ICD-10-CM

## 2021-07-14 DIAGNOSIS — R46.89 COGNITIVE AND BEHAVIORAL CHANGES: Primary | ICD-10-CM

## 2021-07-14 PROBLEM — I21.4 ACUTE NON-ST ELEVATION MYOCARDIAL INFARCTION (NSTEMI) (H): Status: RESOLVED | Noted: 2021-03-28 | Resolved: 2021-04-15

## 2021-07-14 PROBLEM — I10 WHITE COAT SYNDROME WITH HYPERTENSION: Status: RESOLVED | Noted: 2018-04-02 | Resolved: 2021-03-29

## 2021-07-14 NOTE — TELEPHONE ENCOUNTER
Incoming call from patient's daughter in law Cynthia stating patient had a bad night last night and was found wondering at the neighbors house.  Cynthia wondering if patient's son Osito is assigned as patient's power of  if he is able to make medical decisions for patient?  Also needing resources to get patient admitted to memory care ASAP.  Care management referral pending for PCP review.

## 2021-07-15 ENCOUNTER — PATIENT OUTREACH (OUTPATIENT)
Dept: CARE COORDINATION | Facility: CLINIC | Age: 86
End: 2021-07-15

## 2021-07-15 NOTE — PROGRESS NOTES
Clinic Care Coordination Contact  [facility]/Voicemail    Clinical Data: Care Coordinator Outreach  Outreach attempted x 1.  Left message on patients daughter in law Aniyah Harman voicemail with call back information and requested return call.  Plan: Care Coordinator will try to reach patient again in 1-2 business days.    CHW found consent to communicate with daughter mara Harman.

## 2021-07-15 NOTE — TELEPHONE ENCOUNTER
Unfortunately we do not have patients advance care directive on file on our new or old system    Nithya can you please call to help with placement resources.     Call daughter in law traci  [phone]

## 2021-07-15 NOTE — LETTER
[facility] CARE COORDINATION  [address]  [facility] [address]  July 16, 2021    Crista Harman  [address]  [facility] [address]      Dear Crista,    I am a clinic community health worker who works with Ryann Hodge MD at [facility]. I have been trying to reach you recently to introduce Clinic Care Coordination and to see if there was anything I could assist you with.  Below is a description of clinic care coordination and how I can further assist you.      The clinic care coordination team is made up of a registered nurse,  and community health worker who understand the health care system. The goal of clinic care coordination is to help you manage your health and improve access to the health care system in the most efficient manner. The team can assist you in meeting your health care goals by providing education, coordinating services, strengthening the communication among your providers and supporting you with any resource needs.    Please feel free to contact me at [phone] with any questions or concerns. We are focused on providing you with the highest-quality healthcare experience possible and that all starts with you.     Sincerely,   Margaret LARA, Community Health Worker  Clinic Care Coordination  [facility] : [place], [place] & [place]  Phone: [phone]

## 2021-07-16 NOTE — PROGRESS NOTES
Clinic Care Coordination Contact  [facility]/Voicemail       Clinical Data: CHW Outreach  Outreach attempted x 2. Left message on patient's voicemail with call back information and requested return call.    Plan: CHW will send care coordination introduction letter with care coordinator contact information and explanation of care coordination services via [employer].     CHWr will do no further outreaches at this time.    Margaret LARA Community Health Worker  Clinic Care Coordination  [facility] : [place] & [place]  Phone: [phone]    Patient/Caregiver Suport:

## 2021-07-29 ENCOUNTER — TRANSFERRED RECORDS (OUTPATIENT)
Dept: HEALTH INFORMATION MANAGEMENT | Facility: CLINIC | Age: 86
End: 2021-07-29

## 2022-04-15 ENCOUNTER — APPOINTMENT (OUTPATIENT)
Dept: RADIOLOGY | Facility: HOSPITAL | Age: 87
End: 2022-04-15
Payer: COMMERCIAL

## 2022-04-15 ENCOUNTER — HOSPITAL ENCOUNTER (OUTPATIENT)
Facility: HOSPITAL | Age: 87
Setting detail: OBSERVATION
Discharge: HOME | End: 2022-04-17
Attending: EMERGENCY MEDICINE | Admitting: INTERNAL MEDICINE
Payer: COMMERCIAL

## 2022-04-15 ENCOUNTER — APPOINTMENT (OUTPATIENT)
Dept: CT IMAGING | Facility: HOSPITAL | Age: 87
End: 2022-04-15
Payer: COMMERCIAL

## 2022-04-15 DIAGNOSIS — R55 SYNCOPE, UNSPECIFIED SYNCOPE TYPE: ICD-10-CM

## 2022-04-15 LAB
ALBUMIN SERPL-MCNC: 4.2 G/DL (ref 3.5–5)
ALP SERPL-CCNC: 46 U/L (ref 45–120)
ALT SERPL W P-5'-P-CCNC: 14 U/L (ref 0–45)
ANION GAP SERPL CALCULATED.3IONS-SCNC: 9 MMOL/L (ref 5–18)
AST SERPL W P-5'-P-CCNC: 22 U/L (ref 0–40)
BASOPHILS # BLD AUTO: 0 10E3/UL (ref 0–0.2)
BASOPHILS NFR BLD AUTO: 0 %
BILIRUB DIRECT SERPL-MCNC: 0.3 MG/DL
BILIRUB SERPL-MCNC: 0.8 MG/DL (ref 0–1)
BUN SERPL-MCNC: 10 MG/DL (ref 8–28)
CALCIUM SERPL-MCNC: 9.1 MG/DL (ref 8.5–10.5)
CHLORIDE BLD-SCNC: 105 MMOL/L (ref 98–107)
CO2 SERPL-SCNC: 27 MMOL/L (ref 22–31)
CREAT SERPL-MCNC: 0.8 MG/DL (ref 0.6–1.1)
EOSINOPHIL # BLD AUTO: 0 10E3/UL (ref 0–0.7)
EOSINOPHIL NFR BLD AUTO: 0 %
ERYTHROCYTE [DISTWIDTH] IN BLOOD BY AUTOMATED COUNT: 19.3 % (ref 10–15)
GFR SERPL CREATININE-BSD FRML MDRD: 72 ML/MIN/1.73M2
GLUCOSE BLD-MCNC: 94 MG/DL (ref 70–125)
HCT VFR BLD AUTO: 35.5 % (ref 35–47)
HGB BLD-MCNC: 12.1 G/DL (ref 11.7–15.7)
HOLD SPECIMEN: NORMAL
IMM GRANULOCYTES # BLD: 0 10E3/UL
IMM GRANULOCYTES NFR BLD: 0 %
LYMPHOCYTES # BLD AUTO: 2 10E3/UL (ref 0.8–5.3)
LYMPHOCYTES NFR BLD AUTO: 36 %
MAGNESIUM SERPL-MCNC: 2.3 MG/DL (ref 1.8–2.6)
MCH RBC QN AUTO: 31.2 PG (ref 26.5–33)
MCHC RBC AUTO-ENTMCNC: 34.1 G/DL (ref 31.5–36.5)
MCV RBC AUTO: 92 FL (ref 78–100)
MONOCYTES # BLD AUTO: 0.5 10E3/UL (ref 0–1.3)
MONOCYTES NFR BLD AUTO: 8 %
NEUTROPHILS # BLD AUTO: 3.1 10E3/UL (ref 1.6–8.3)
NEUTROPHILS NFR BLD AUTO: 56 %
NRBC # BLD AUTO: 0 10E3/UL
NRBC BLD AUTO-RTO: 0 /100
PLATELET # BLD AUTO: 193 10E3/UL (ref 150–450)
POTASSIUM BLD-SCNC: 3.4 MMOL/L (ref 3.5–5)
PROT SERPL-MCNC: 6.7 G/DL (ref 6–8)
RBC # BLD AUTO: 3.88 10E6/UL (ref 3.8–5.2)
SODIUM SERPL-SCNC: 141 MMOL/L (ref 136–145)
T4 FREE SERPL-MCNC: 1 NG/DL (ref 0.7–1.8)
TROPONIN I SERPL-MCNC: <0.01 NG/ML (ref 0–0.29)
TSH SERPL DL<=0.005 MIU/L-ACNC: 9.15 UIU/ML (ref 0.3–5)
WBC # BLD AUTO: 5.6 10E3/UL (ref 4–11)

## 2022-04-15 PROCEDURE — 82248 BILIRUBIN DIRECT: CPT | Performed by: STUDENT IN AN ORGANIZED HEALTH CARE EDUCATION/TRAINING PROGRAM

## 2022-04-15 PROCEDURE — 96361 HYDRATE IV INFUSION ADD-ON: CPT

## 2022-04-15 PROCEDURE — 85025 COMPLETE CBC W/AUTO DIFF WBC: CPT | Performed by: PHYSICIAN ASSISTANT

## 2022-04-15 PROCEDURE — 84480 ASSAY TRIIODOTHYRONINE (T3): CPT | Performed by: STUDENT IN AN ORGANIZED HEALTH CARE EDUCATION/TRAINING PROGRAM

## 2022-04-15 PROCEDURE — 70450 CT HEAD/BRAIN W/O DYE: CPT

## 2022-04-15 PROCEDURE — 93005 ELECTROCARDIOGRAM TRACING: CPT | Performed by: PHYSICIAN ASSISTANT

## 2022-04-15 PROCEDURE — 99285 EMERGENCY DEPT VISIT HI MDM: CPT | Mod: 25

## 2022-04-15 PROCEDURE — G0378 HOSPITAL OBSERVATION PER HR: HCPCS

## 2022-04-15 PROCEDURE — 84484 ASSAY OF TROPONIN QUANT: CPT | Performed by: PHYSICIAN ASSISTANT

## 2022-04-15 PROCEDURE — 258N000003 HC RX IP 258 OP 636: Performed by: PHYSICIAN ASSISTANT

## 2022-04-15 PROCEDURE — 87635 SARS-COV-2 COVID-19 AMP PRB: CPT | Performed by: PHYSICIAN ASSISTANT

## 2022-04-15 PROCEDURE — 84439 ASSAY OF FREE THYROXINE: CPT | Performed by: STUDENT IN AN ORGANIZED HEALTH CARE EDUCATION/TRAINING PROGRAM

## 2022-04-15 PROCEDURE — 71045 X-RAY EXAM CHEST 1 VIEW: CPT

## 2022-04-15 PROCEDURE — 83735 ASSAY OF MAGNESIUM: CPT | Performed by: PHYSICIAN ASSISTANT

## 2022-04-15 PROCEDURE — 36415 COLL VENOUS BLD VENIPUNCTURE: CPT | Performed by: EMERGENCY MEDICINE

## 2022-04-15 PROCEDURE — 80053 COMPREHEN METABOLIC PANEL: CPT | Performed by: PHYSICIAN ASSISTANT

## 2022-04-15 PROCEDURE — 84443 ASSAY THYROID STIM HORMONE: CPT | Performed by: PHYSICIAN ASSISTANT

## 2022-04-15 PROCEDURE — 84439 ASSAY OF FREE THYROXINE: CPT | Performed by: PHYSICIAN ASSISTANT

## 2022-04-15 PROCEDURE — 96360 HYDRATION IV INFUSION INIT: CPT

## 2022-04-15 PROCEDURE — 36415 COLL VENOUS BLD VENIPUNCTURE: CPT | Performed by: PHYSICIAN ASSISTANT

## 2022-04-15 PROCEDURE — C9803 HOPD COVID-19 SPEC COLLECT: HCPCS

## 2022-04-15 PROCEDURE — 99219 PR INITIAL OBSERVATION CARE,LEVEL II: CPT | Performed by: STUDENT IN AN ORGANIZED HEALTH CARE EDUCATION/TRAINING PROGRAM

## 2022-04-15 PROCEDURE — 250N000013 HC RX MED GY IP 250 OP 250 PS 637: Performed by: STUDENT IN AN ORGANIZED HEALTH CARE EDUCATION/TRAINING PROGRAM

## 2022-04-15 RX ORDER — NITROGLYCERIN 0.4 MG/1
0.4 TABLET SUBLINGUAL EVERY 5 MIN PRN
Status: DISCONTINUED | OUTPATIENT
Start: 2022-04-15 | End: 2022-04-17 | Stop reason: HOSPADM

## 2022-04-15 RX ORDER — ACETAMINOPHEN 650 MG/1
650 SUPPOSITORY RECTAL EVERY 6 HOURS PRN
Status: DISCONTINUED | OUTPATIENT
Start: 2022-04-15 | End: 2022-04-17 | Stop reason: HOSPADM

## 2022-04-15 RX ORDER — DONEPEZIL HYDROCHLORIDE 5 MG/1
5 TABLET, FILM COATED ORAL AT BEDTIME
Status: DISCONTINUED | OUTPATIENT
Start: 2022-04-16 | End: 2022-04-17 | Stop reason: HOSPADM

## 2022-04-15 RX ORDER — CLOPIDOGREL BISULFATE 75 MG/1
75 TABLET ORAL DAILY
COMMUNITY
End: 2022-07-11

## 2022-04-15 RX ORDER — ASPIRIN 81 MG/1
81 TABLET ORAL DAILY
COMMUNITY

## 2022-04-15 RX ORDER — ISOSORBIDE MONONITRATE 30 MG/1
60 TABLET, EXTENDED RELEASE ORAL EVERY EVENING
COMMUNITY
End: 2022-04-29

## 2022-04-15 RX ORDER — DONEPEZIL HYDROCHLORIDE 5 MG/1
5 TABLET, FILM COATED ORAL AT BEDTIME
COMMUNITY
End: 2022-04-27

## 2022-04-15 RX ORDER — ACETAMINOPHEN 325 MG/1
650 TABLET ORAL EVERY 6 HOURS PRN
Status: DISCONTINUED | OUTPATIENT
Start: 2022-04-15 | End: 2022-04-17 | Stop reason: HOSPADM

## 2022-04-15 RX ORDER — ASPIRIN 81 MG/1
81 TABLET ORAL DAILY
Status: DISCONTINUED | OUTPATIENT
Start: 2022-04-16 | End: 2022-04-17 | Stop reason: HOSPADM

## 2022-04-15 RX ORDER — POTASSIUM CHLORIDE 1500 MG/1
40 TABLET, EXTENDED RELEASE ORAL ONCE
Status: COMPLETED | OUTPATIENT
Start: 2022-04-15 | End: 2022-04-15

## 2022-04-15 RX ORDER — PANTOPRAZOLE SODIUM 40 MG/1
40 TABLET, DELAYED RELEASE ORAL
Status: DISCONTINUED | OUTPATIENT
Start: 2022-04-16 | End: 2022-04-17 | Stop reason: HOSPADM

## 2022-04-15 RX ORDER — ATORVASTATIN CALCIUM 80 MG/1
80 TABLET, FILM COATED ORAL DAILY
COMMUNITY
End: 2022-07-11

## 2022-04-15 RX ORDER — CLOPIDOGREL BISULFATE 75 MG/1
75 TABLET ORAL DAILY
Status: DISCONTINUED | OUTPATIENT
Start: 2022-04-16 | End: 2022-04-17 | Stop reason: HOSPADM

## 2022-04-15 RX ORDER — QUETIAPINE FUMARATE 25 MG/1
25 TABLET, FILM COATED ORAL AT BEDTIME
Status: DISCONTINUED | OUTPATIENT
Start: 2022-04-16 | End: 2022-04-17 | Stop reason: HOSPADM

## 2022-04-15 RX ORDER — ISOSORBIDE MONONITRATE 30 MG/1
60 TABLET, EXTENDED RELEASE ORAL EVERY EVENING
Status: DISCONTINUED | OUTPATIENT
Start: 2022-04-16 | End: 2022-04-17 | Stop reason: HOSPADM

## 2022-04-15 RX ORDER — LANOLIN ALCOHOL/MO/W.PET/CERES
3 CREAM (GRAM) TOPICAL
Status: DISCONTINUED | OUTPATIENT
Start: 2022-04-15 | End: 2022-04-17 | Stop reason: HOSPADM

## 2022-04-15 RX ORDER — ATORVASTATIN CALCIUM 40 MG/1
80 TABLET, FILM COATED ORAL DAILY
Status: DISCONTINUED | OUTPATIENT
Start: 2022-04-16 | End: 2022-04-17 | Stop reason: HOSPADM

## 2022-04-15 RX ADMIN — POTASSIUM CHLORIDE 40 MEQ: 1500 TABLET, EXTENDED RELEASE ORAL at 23:34

## 2022-04-15 RX ADMIN — SODIUM CHLORIDE 500 ML: 9 INJECTION, SOLUTION INTRAVENOUS at 22:05

## 2022-04-15 ASSESSMENT — ENCOUNTER SYMPTOMS
FREQUENCY: 0
SORE THROAT: 0
DIARRHEA: 0
NAUSEA: 0
HEADACHES: 0
MYALGIAS: 0
CHILLS: 0
ABDOMINAL PAIN: 0
NERVOUS/ANXIOUS: 0
DYSURIA: 0
SHORTNESS OF BREATH: 0
COUGH: 0
VOMITING: 0
FEVER: 0
LIGHT-HEADEDNESS: 1
HEMATURIA: 0

## 2022-04-16 ENCOUNTER — APPOINTMENT (OUTPATIENT)
Dept: CARDIOLOGY | Facility: HOSPITAL | Age: 87
End: 2022-04-16
Attending: STUDENT IN AN ORGANIZED HEALTH CARE EDUCATION/TRAINING PROGRAM
Payer: COMMERCIAL

## 2022-04-16 LAB
ATRIAL RATE - MUSE: 60 BPM
DIASTOLIC BLOOD PRESSURE - MUSE: NORMAL MMHG
INTERPRETATION ECG - MUSE: NORMAL
P AXIS - MUSE: 72 DEGREES
PR INTERVAL - MUSE: 156 MS
QRS DURATION - MUSE: 84 MS
QT - MUSE: 470 MS
QTC - MUSE: 470 MS
R AXIS - MUSE: -5 DEGREES
SARS-COV-2 RNA RESP QL NAA+PROBE: NEGATIVE
SYSTOLIC BLOOD PRESSURE - MUSE: NORMAL MMHG
T AXIS - MUSE: -45 DEGREES
T3 SERPL-MCNC: 87 NG/DL (ref 60–181)
T4 FREE SERPL-MCNC: 1.02 NG/DL (ref 0.7–1.8)
TROPONIN I SERPL-MCNC: 0.01 NG/ML (ref 0–0.29)
TROPONIN I SERPL-MCNC: <0.01 NG/ML (ref 0–0.29)
VENTRICULAR RATE- MUSE: 60 BPM

## 2022-04-16 PROCEDURE — 96361 HYDRATE IV INFUSION ADD-ON: CPT

## 2022-04-16 PROCEDURE — 99214 OFFICE O/P EST MOD 30 MIN: CPT | Mod: 25 | Performed by: INTERNAL MEDICINE

## 2022-04-16 PROCEDURE — 84132 ASSAY OF SERUM POTASSIUM: CPT | Performed by: STUDENT IN AN ORGANIZED HEALTH CARE EDUCATION/TRAINING PROGRAM

## 2022-04-16 PROCEDURE — 250N000013 HC RX MED GY IP 250 OP 250 PS 637: Performed by: STUDENT IN AN ORGANIZED HEALTH CARE EDUCATION/TRAINING PROGRAM

## 2022-04-16 PROCEDURE — 84484 ASSAY OF TROPONIN QUANT: CPT | Mod: 91 | Performed by: STUDENT IN AN ORGANIZED HEALTH CARE EDUCATION/TRAINING PROGRAM

## 2022-04-16 PROCEDURE — 99225 PR SUBSEQUENT OBSERVATION CARE,LEVEL II: CPT | Performed by: INTERNAL MEDICINE

## 2022-04-16 PROCEDURE — 93306 TTE W/DOPPLER COMPLETE: CPT

## 2022-04-16 PROCEDURE — 258N000003 HC RX IP 258 OP 636: Performed by: STUDENT IN AN ORGANIZED HEALTH CARE EDUCATION/TRAINING PROGRAM

## 2022-04-16 PROCEDURE — 36415 COLL VENOUS BLD VENIPUNCTURE: CPT | Performed by: STUDENT IN AN ORGANIZED HEALTH CARE EDUCATION/TRAINING PROGRAM

## 2022-04-16 PROCEDURE — 93306 TTE W/DOPPLER COMPLETE: CPT | Mod: 26 | Performed by: INTERNAL MEDICINE

## 2022-04-16 PROCEDURE — G0378 HOSPITAL OBSERVATION PER HR: HCPCS

## 2022-04-16 RX ORDER — SODIUM CHLORIDE 9 MG/ML
INJECTION, SOLUTION INTRAVENOUS CONTINUOUS
Status: ACTIVE | OUTPATIENT
Start: 2022-04-16 | End: 2022-04-16

## 2022-04-16 RX ADMIN — ASPIRIN 81 MG: 81 TABLET, COATED ORAL at 08:06

## 2022-04-16 RX ADMIN — PANTOPRAZOLE SODIUM 40 MG: 20 TABLET, DELAYED RELEASE ORAL at 06:52

## 2022-04-16 RX ADMIN — CLOPIDOGREL BISULFATE 75 MG: 75 TABLET ORAL at 08:06

## 2022-04-16 RX ADMIN — SODIUM CHLORIDE: 9 INJECTION, SOLUTION INTRAVENOUS at 04:05

## 2022-04-16 RX ADMIN — ATORVASTATIN CALCIUM 80 MG: 40 TABLET, FILM COATED ORAL at 08:08

## 2022-04-16 RX ADMIN — ISOSORBIDE MONONITRATE 60 MG: 60 TABLET, EXTENDED RELEASE ORAL at 00:46

## 2022-04-16 RX ADMIN — QUETIAPINE 25 MG: 25 TABLET ORAL at 20:35

## 2022-04-16 RX ADMIN — ISOSORBIDE MONONITRATE 60 MG: 60 TABLET, EXTENDED RELEASE ORAL at 20:35

## 2022-04-16 ASSESSMENT — ACTIVITIES OF DAILY LIVING (ADL): DEPENDENT_IADLS:: TRANSPORTATION;SHOPPING;CLEANING;LAUNDRY

## 2022-04-16 NOTE — ED NOTES
"[facility] ED Handoff Report    ED Chief Complaint: syncope    ED Diagnosis:  (R55) Syncope, unspecified syncope type  Comment:   Plan: cardilogy consult and observation       PMH:    Past Medical History:   Diagnosis Date     Acute non-ST elevation myocardial infarction (NSTEMI) (H) 3/28/2021     CAD (coronary artery disease)      CAD (coronary artery disease)      Colitis 1/22/2015     Coronary artery disease due to lipid rich plaque 12/18/2012     Dyslipidemia, goal LDL below 70 12/18/2012     Essential hypertension 12/18/2012     History of heart attack      History of tobacco abuse      HTN (hypertension)      Hyperlipidemia      Hypertension      Peripheral vascular disease (H)         Code Status:  No CPR- Do NOT Intubate     Falls Risk: Yes Band: Applied    Current Living Situation/Residence: lives with their son or daughter     Elimination Status: Continent: Yes     Activity Level: SBA    Patients Preferred Language:  English     Needed: No    Vital Signs:  BP (!) 149/65   Pulse 57   Temp 98.4  F (36.9  C) (Oral)   Resp 17   Ht 1.549 m (5' 1\")   Wt 47.6 kg (105 lb)   SpO2 98%   BMI 19.84 kg/m       Cardiac Rhythm: Sinus alex    Pain Score: 0/10    Is the Patient Confused:  No    Last Food or Drink: 04/16/22 at breakfast    Focused Assessment:  Pt with witnessed syncopal episode while on couch at home, no recent falls.  At present pt denies any lightheadedness/dizziness and or weakness.  Pt is a/o to  Self and place but forgetful with other.  States doesn't know president and or exact date as she doesn't keep track of  Those.  Pt knew it wasn't quite May but unsure of date.      Tests Performed: Done: Labs and Imaging    Treatments Provided:  Fluids, potassium replacement and echo    Family Dynamics/Concerns: No    Family Updated On Visitor Policy: Yes    Plan of Care Communicated to Family: Yes    Who Was Updated about Plan of Care: son, by pt.    Belongings Checklist Done and " Signed by Patient: Yes    Medications sent with patient: none    Covid: asymptomatic , negative    Additional Information: Up with sba assist, pt steady on feet no syncope and or dizziness and or lightheadedness.    RN: Joceline Hidalgo   4/16/2022 1:03 PM

## 2022-04-16 NOTE — ED TRIAGE NOTES
Patient experienced a witnessed syncopal episode while sitting on a couch at home. Syncopal episode lasted for 2 minutes according to the son. Denies recent falls. Patient is on Plavix.

## 2022-04-16 NOTE — ED NOTES
Bed: JNED-01  Expected date: 4/15/22  Expected time: 8:04 PM  Means of arrival: Ambulance  Comments:  MIQUEL  85yo F syncope

## 2022-04-16 NOTE — PROGRESS NOTES
[facility]    Medicine Progress Note - Hospitalist Service    Date of Admission:  4/15/2022    Assessment & Plan          86-year-old female with history of coronary artery disease, hypertension, neurocognitive decline presents with syncopal episode at home.      Syncope: Etiology include cardiac arrhythmia, orthostatic hypotension and vasovagal syncope. CT head negative for acute changes, EKG sinus bradycardia. Troponin negative.  - Continue telemetry monitoring  - Echocardiogram  - Cardiology input appreciated  - BP shows orthostatic hypotension from lying to sitting.  Received 500 mL bolus initially. Continue IV hydration with NS     Hypokalemia: Potassium 3.4 on admission. Will replace and recheck.     Essential hypertension: BP controlled. Continue PTA imdur     CAD: No anginal chest pain. Coronary angiogram in April 2021 showed severe right coronary artery ostial stenosis unamenable to PCI and recommended medical therapy.   - Continue Plavix and aspirin, Imdur, statin.       Subclincal hypothyroidism: TSH elevated to 9.1 with normal total T3 and free T4.  - Outpatient follow up     Neurocognitive decline: Holding pta donepezil given bradycardia with hx of syncope       Diet: Low Saturated Fat Na <2400 mg    DVT Prophylaxis: Low Risk/Ambulatory with no VTE prophylaxis indicated  Chauhan Catheter: Not present  Central Lines: None  Cardiac Monitoring: None  Code Status: No CPR- Do NOT Intubate      Disposition Plan   Expected Discharge: 04/17/2022     Anticipated discharge location:Home       The patient's care was discussed with the Bedside Nurse, Care Coordinator/, Patient and Patient's Family (her son).    Bam Marcelino MD  Hospitalist Service  [facility]  Securely message with the Vocera Web Console (learn more here)  Text page via [employer] Paging/Directory       ______________________________________________________________________    Interval History    Patient reports feeling well today. No chest pain and no SOB. No dizziness. Her son was by the bedside.Plan of care discussed.     Data reviewed today: I reviewed all medications, new labs and imaging results over the last 24 hours.     Physical Exam   Vital Signs: Temp: 98.3  F (36.8  C) Temp src: Oral BP: (!) 160/75 Pulse: 50   Resp: 18 SpO2: 97 % O2 Device: None (Room air)    Weight: 96 lbs 14.4 oz    General appearance: not in acute distress  HEENT: PERRL, EOMI  Lungs: Clear breath sounds in bilateral lung fields  Cardiovascular: Regular rate and rhythm, normal S1-S2  Abdomen: Soft, non tender, no distension  Musculoskeletal: No joint swelling  Skin: No rash and no edema  Neurology: AAO ×3.  Cranial nerves II - XII normal.  Normal muscle strength in all four extremities.    Data   Recent Labs   Lab 04/15/22  2026   WBC 5.6   HGB 12.1   MCV 92         POTASSIUM 3.4*   CHLORIDE 105   CO2 27   BUN 10   CR 0.80   ANIONGAP 9   ELEANOR 9.1   GLC 94   ALBUMIN 4.2   PROTTOTAL 6.7   BILITOTAL 0.8   ALKPHOS 46   ALT 14   AST 22

## 2022-04-16 NOTE — PHARMACY-ADMISSION MEDICATION HISTORY
Pharmacy Note - Admission Medication History    Pertinent Provider Information: None     ______________________________________________________________________    Prior To Admission (PTA) med list completed and updated in EMR.       PTA Med List   Medication Sig Last Dose     aspirin 81 MG EC tablet Take 81 mg by mouth daily 4/14/2022     atorvastatin (LIPITOR) 80 MG tablet Take 80 mg by mouth daily 4/15/2022     clopidogrel (PLAVIX) 75 MG tablet Take 75 mg by mouth daily 4/15/2022     donepezil (ARICEPT) 5 MG tablet Take 5 mg by mouth At Bedtime 4/14/2022     isosorbide mononitrate (IMDUR) 30 MG 24 hr tablet Take 60 mg by mouth every evening 4/14/2022     omeprazole (PRILOSEC) 20 MG DR capsule TAKE 1 CAPSULE(20 MG) BY MOUTH DAILY BEFORE BREAKFAST 4/15/2022     QUEtiapine (SEROQUEL) 25 MG tablet TAKE 1/2 TO 1 TABLET EVERY EVENING AROUND DINNER 4/14/2022 at 25 mg       Information source(s): Family member and CareEverywhere/SureScripts  Method of interview communication: in-person and phone    Summary of Changes to PTA Med List  New: atorvastatin, Plavix, Imdur, donepezil  Discontinued: hydrochlorothiazide, simvastatin  Changed: aspirin dose    Patient was asked about OTC/herbal products specifically.  PTA med list reflects this.    Allergies were reviewed, assessed, and updated with the patient.      Patient does not use any multi-dose medications prior to admission.    The information provided in this note is only as accurate as the sources available at the time of the update(s).    Thank you for the opportunity to participate in the care of this patient.    Yolie Trinh [facility]  4/15/2022 11:05 PM

## 2022-04-16 NOTE — ED NOTES
Up to bathroom with sba, pt steady on feet, denies feeling dizzy lightheaded and hoping to go home.

## 2022-04-16 NOTE — CONSULTS
"Care Management Initial Consult    General Information  Assessment completed with: PatientCrista  Type of CM/SW Visit: Initial Assessment    Primary Care Provider verified and updated as needed: Yes   Readmission within the last 30 days: no previous admission in last 30 days      Reason for Consult: discharge planning  Advance Care Planning: Advance Care Planning Reviewed: no concerns identified          Communication Assessment  Patient's communication style: spoken language (English or Bilingual)    Hearing Difficulty or Deaf: no   Wear Glasses or Blind: no                 Living Environment:   People in home: child(geoffrey), adult  son Guillermo and daughter in law  Current living Arrangements: house      Able to return to prior arrangements: yes       Family/Social Support:  Care provided by: self  Provides care for: no one, unable/limited ability to care for self  Marital Status:   Children          Description of Support System: Supportive, Involved    Support Assessment: Adequate family and caregiver support, Adequate social supports, Patient communicates needs well met    Current Resources:   Patient receiving home care services: No     Community Resources: None  Equipment currently used at home: none  Supplies currently used at home: Other (\"dentures, reading glasses\")    Employment/Financial:  Employment Status: retired     Employment/ Comments: \"no  history\"  Financial Concerns:     Referral to Financial Worker: No       Lifestyle & Psychosocial Needs:  Social Determinants of Health     Tobacco Use: Medium Risk     Smoking Tobacco Use: Former Smoker     Smokeless Tobacco Use: Never Used   Alcohol Use: Not on file   Financial Resource Strain: Not on file   Food Insecurity: Not on file   Transportation Needs: Not on file   Physical Activity: Not on file   Stress: Not on file   Social Connections: Not on file   Intimate Partner Violence: Not on file   Depression: At risk     PHQ-2 Score: " "4   Housing Stability: Not on file       Functional Status:  Prior to admission patient needed assistance:   Dependent ADLs:: Independent, Ambulation-no assistive device  Dependent IADLs:: Transportation, Shopping, Cleaning, Laundry (\"family helps\")       Mental Health Status:          Chemical Dependency Status:                Values/Beliefs:  Spiritual, Cultural Beliefs, Tenriism Practices, Values that affect care:                 Additional Information:  Crista lives in a house with her son Guillermo and daughter in law.     She is independent with ADLs at baseline. She does not drive and family helps with transportation, housekeeping, laundry, and shopping.    Likely no discharge needs at this time.    Family to transport at discharge.    Gina Rhodes RN      "

## 2022-04-16 NOTE — ED PROVIDER NOTES
Emergency Department Midlevel Supervisory Note     I personally saw the patient and performed a substantive portion of the visit including all aspects of the medical decision making.    ED Course:  8:30 PM Liat Ugalde PA-C staffed patient with me. I agree with their assessment and plan of management, and I will see the patient.      86 year old female, history of CAD with NSTEMI (on Plavix), HTN, HLD, PVD, CKD stage 3 and colitis, who presents for evaluation after a syncopal episode with LOC of ~5 minutes; she spontaneously regained consciousness, but was confused and somewhat amnestic to the events surrounding the episode. She did have pre-syncopal prodrome. Son was present and helped prevent any injuries. She denies any headache, chest pain, SOB.    Exam unremarkable.    Patient placed on cardiac monitor, IV access established and blood sent for labs.    EKG performed and demonstrated NSR with poor R wave progression, T wave inversions seen in the inferolateral leads with no ST-T wave elevation consistent with ACS; troponin WNL (<0.01).    CXR performed and demonstrated normal cardiomediastinal silhouette. Atherosclerotic vascular calcification of the aortic knob. No suspicious focal pulmonary opacities. No significant pleural effusion or pneumothorax.    Head CT performed and demonstrated:  1. No acute intracranial process.  2. Mild to moderate chronic small vessel ischemic disease and mild generalized brain parenchymal volume loss.    Nothing in history to suggest seizure.     Labs otherwise remarkable for no leukocytosis, anemia, significant electrolyte derangements or renal impairment.  TSH elevated (9.15) with additional studies pending.    Given age and syncopal episode, decision made to admit patient for further evaluation.  Patient stable throughout ED course.    10:24 PM Patient admitted to Dr. Sanford, hospitalist.    FINAL IMPRESSION:    ICD-10-CM    1. Syncope, unspecified syncope type  R55         MEDICATIONS GIVEN IN THE EMERGENCY DEPARTMENT:  Medications   clopidogrel (PLAVIX) tablet 75 mg (75 mg Oral Given 4/16/22 0806)   atorvastatin (LIPITOR) tablet 80 mg (80 mg Oral Given 4/16/22 0808)   aspirin EC tablet 81 mg (81 mg Oral Given 4/16/22 0806)   donepezil (ARICEPT) tablet 5 mg ( Oral Automatically Held 4/19/22 2200)   isosorbide mononitrate (IMDUR) 24 hr tablet 60 mg (60 mg Oral Given 4/16/22 0046)   QUEtiapine (SEROquel) tablet 25 mg ( Oral Automatically Held 4/19/22 2200)   melatonin tablet 3 mg (has no administration in time range)   acetaminophen (TYLENOL) tablet 650 mg (has no administration in time range)     Or   acetaminophen (TYLENOL) Suppository 650 mg (has no administration in time range)   nitroGLYcerin (NITROSTAT) sublingual tablet 0.4 mg (has no administration in time range)   pantoprazole (PROTONIX) EC tablet 40 mg (40 mg Oral Given 4/16/22 0652)   sodium chloride 0.9% infusion (100 mL/hr Intravenous Rate/Dose Verify 4/16/22 0805)   0.9% sodium chloride BOLUS (0 mLs Intravenous Stopped 4/15/22 2337)   potassium chloride ER (KLOR-CON M) CR tablet 40 mEq (40 mEq Oral Given 4/15/22 2334)       NEW PRESCRIPTIONS STARTED AT TODAY'S ED VISIT:  New Prescriptions    No medications on file           CHIEF COMPLAINT:  Syncope      Triage Note:Patient experienced a witnessed syncopal episode while sitting on a couch at home. Syncopal episode lasted for 2 minutes according to the son. Denies recent falls. Patient is on Plavix.        HPI     The history is provided by the patient. No  was used.        Crista Harman is a 86 year old female with a pertinent history of CAD with NSTEMI (on Plavix), HTN, HLD, PVD, CKD stage 3 and colitis, who presents to this ED by ambulance for evaluation after a syncopal episode. The patient was walking from her bedroom to the living room when she began to feel lightheaded. She sat down and then lost consciousness. She was unresponsive and her  son lowered her to the floor. She remained unconscious for ~5 minutes and thus her son called 911. She returned to baseline when she spontaneously regained consciousness, however is confused and amnestic to the event thinking that she had been outside.     She took her normal evening medications about 20 minutes before the syncopal episode. She does not take blood pressure medication in the evening.    She did have a fall in the shower last weekend.    She has otherwise been in her normal state of health and denies headache, chest pain, SOB, abdominal pain, nausea, vomiting, diarrhea, cough, fever or other concerns.    REVIEW OF SYSTEMS:  All other systems reviewed and are negative.      Medical History     Past Medical History:   Diagnosis Date     Acute non-ST elevation myocardial infarction (NSTEMI) (H) 3/28/2021     CAD (coronary artery disease)      CAD (coronary artery disease)      Colitis 1/22/2015     Coronary artery disease due to lipid rich plaque 12/18/2012     Dyslipidemia, goal LDL below 70 12/18/2012     Essential hypertension 12/18/2012     History of heart attack      History of tobacco abuse      HTN (hypertension)      Hyperlipidemia      Hypertension      Peripheral vascular disease (H)        Past Surgical History:   Procedure Laterality Date     APPENDECTOMY       BACK SURGERY       CORONARY STENT PLACEMENT      2      CV CORONARY ANGIOGRAM N/A 3/28/2021    Procedure: Coronary Angiogram;  Surgeon: Narinder Ryan MD;  Location: [facility] Cardiac Cath Lab;  Service: Cardiology     CV LEFT HEART CATHETERIZATION WITH LEFT VENTRICULOGRAM N/A 3/28/2021    Procedure: Left Heart Catheterization with Left Ventriculogram;  Surgeon: Narinder Ryan MD;  Location: [facility] Cardiac Cath Lab;  Service: Cardiology     femoral stents  2011     IR ABDOMINAL AORTOGRAM  8/25/2011     IR MISCELLANEOUS PROCEDURE  8/25/2011     OOPHORECTOMY       STENT,CORONARY, S660 24/30 2007     VASCULAR SURGERY       "stents legs        Family History   Problem Relation Age of Onset     Coronary Artery Disease Father      Prostate Cancer Father      Diabetes No family hx of      Hypertension No family hx of      Breast Cancer No family hx of      Colon Cancer No family hx of      Other Cancer No family hx of      Kidney Disease Mother      Heart Disease Father      Heart Disease Brother      Hypertension Brother        Social History     Tobacco Use     Smoking status: Former Smoker     Types: Cigarettes, Cigarettes     Quit date: 1/22/2007     Years since quitting: 15.2     Smokeless tobacco: Never Used   Substance Use Topics     Alcohol use: Yes     Comment: Alcoholic Drinks/day: occasional beer or bloody randall     Drug use: No       aspirin 81 MG EC tablet  atorvastatin (LIPITOR) 80 MG tablet  clopidogrel (PLAVIX) 75 MG tablet  donepezil (ARICEPT) 5 MG tablet  isosorbide mononitrate (IMDUR) 30 MG 24 hr tablet  omeprazole (PRILOSEC) 20 MG DR capsule  QUEtiapine (SEROQUEL) 25 MG tablet        Physical Exam     First Vitals:  Patient Vitals for the past 24 hrs:   BP Temp Temp src Pulse Resp SpO2 Height Weight   04/16/22 0800 (!) 150/70 -- -- 54 17 98 % -- --   04/16/22 0400 117/59 98.4  F (36.9  C) Oral 52 22 97 % -- --   04/16/22 0200 101/52 -- -- 57 18 98 % -- --   04/16/22 0045 118/56 -- -- 59 -- -- -- --   04/16/22 0000 125/58 -- -- 55 22 98 % -- --   04/15/22 2344 135/89 97.6  F (36.4  C) Oral 76 20 99 % -- --   04/15/22 2245 (!) 159/73 -- -- 62 (!) 36 99 % -- --   04/15/22 2230 (!) 150/64 -- -- 69 (!) 42 98 % -- --   04/15/22 2215 (!) 142/63 -- -- 51 17 98 % -- --   04/15/22 2206 125/61 -- -- 57 22 99 % -- --   04/15/22 2016 (!) 159/92 97.6  F (36.4  C) Oral 59 18 99 % -- --   04/15/22 2011 -- -- -- -- -- -- 1.549 m (5' 1\") 47.6 kg (105 lb)       PHYSICAL EXAM:   Physical Exam    GENERAL: Awake, alert.  In no acute distress.   HEENT: Normocephalic, atraumatic. Pupils equal, round and reactive. Conjunctiva normal. EOMI. "   NECK: No stridor.  PULMONARY: Symmetrical breath sounds without distress.  Lungs clear to auscultation bilaterally without wheezes, rhonchi or rales.  CARDIO: Regular rate and rhythm.  No significant murmur, rub or gallop.  Radial pulses strong and symmetrical.  ABDOMINAL: Abdomen soft, non-distended and non-tender to palpation.    EXTREMITIES: No lower extremity swelling or edema.      NEURO: Alert and oriented to person, place and time.  Cranial nerves grossly intact.  No focal motor deficit.  PSYCH: Normal mood and affect.  SKIN: No rashes.     Results     LAB:  All pertinent labs reviewed and interpreted  Labs Ordered and Resulted from Time of ED Arrival to Time of ED Departure   BASIC METABOLIC PANEL - Abnormal       Result Value    Sodium 141      Potassium 3.4 (*)     Chloride 105      Carbon Dioxide (CO2) 27      Anion Gap 9      Urea Nitrogen 10      Creatinine 0.80      Calcium 9.1      Glucose 94      GFR Estimate 72     TSH WITH FREE T4 REFLEX - Abnormal    TSH 9.15 (*)    CBC WITH PLATELETS AND DIFFERENTIAL - Abnormal    WBC Count 5.6      RBC Count 3.88      Hemoglobin 12.1      Hematocrit 35.5      MCV 92      MCH 31.2      MCHC 34.1      RDW 19.3 (*)     Platelet Count 193      % Neutrophils 56      % Lymphocytes 36      % Monocytes 8      % Eosinophils 0      % Basophils 0      % Immature Granulocytes 0      NRBCs per 100 WBC 0      Absolute Neutrophils 3.1      Absolute Lymphocytes 2.0      Absolute Monocytes 0.5      Absolute Eosinophils 0.0      Absolute Basophils 0.0      Absolute Immature Granulocytes 0.0      Absolute NRBCs 0.0     MAGNESIUM - Normal    Magnesium 2.3     TROPONIN I - Normal    Troponin I <0.01     T4 FREE - Normal    Free T4 1.00     T4 FREE - Normal    Free T4 1.02     T3 TOTAL - Normal    T3 Total 87     COVID-19 VIRUS (CORONAVIRUS) BY PCR - Normal    SARS CoV2 PCR Negative     HEPATIC FUNCTION PANEL - Normal    Bilirubin Total 0.8      Bilirubin Direct 0.3      Protein  Total 6.7      Albumin 4.2      Alkaline Phosphatase 46      AST 22      ALT 14     TROPONIN I - Normal    Troponin I 0.01     TROPONIN I - Normal    Troponin I <0.01         RADIOLOGY:  XR Chest 1 View   Final Result   IMPRESSION: Single AP view of the chest were obtained. Cardiomediastinal silhouette is within normal limits. Atherosclerotic vascular calcification of the aortic knob. No suspicious focal pulmonary opacities. No significant pleural effusion or    pneumothorax.      Head CT w/o contrast   Final Result   IMPRESSION:   1.  No acute intracranial process.   2.  Mild to moderate chronic small vessel ischemic disease and mild generalized brain parenchymal volume loss.      Echocardiogram Complete    (Results Pending)     EC/15/2022, 20:19; NSR with rate of 60 bpm; normal intervals; normal conduction; poor R wave progression; ST-T wave abnormality with T wave inversions inferolateral leads with no ST-T wave elevation consistent with ACS; compared to previous EKG dated 2021, ST-T wave abnormality with T wave inversions more pronounced in inferior leads    EKG independently reviewed and interpreted by Mary Lou Carr MD      I, Carlos Hall, am serving as a scribe to document services personally performed by Mary Lou Carr MD based on my observation and the provider's statements to me. I, Mary Lou Carr MD attest that Carlos Hall is acting in a scribe capacity, has observed my performance of the services and has documented them in accordance with my direction.    Mary Lou Carr MD  Emergency Medicine  [facility] EMERGENCY DEPARTMENT     Mary Lou Carr MD  [phone]

## 2022-04-16 NOTE — PLAN OF CARE
PRIMARY DIAGNOSIS: SYNCOPE/TIA  OUTPATIENT/OBSERVATION GOALS TO BE MET BEFORE DISCHARGE:  1. Orthostatic performed: Yes:          Lying Orthostatic BP: 118/56         Sitting Orthostatic BP: 109/69         Standing Orthostatic BP: 136/61     2. Diagnostic testing complete & at baseline neurologic testing: No, echocardiogram in the morning. Cardiology consult.  Troponin level Q6    3. Cleared by consultants (if involved): No    4. Interpretation of cardiac rhythm per telemetry tech: Sinus bradycardia, HR in 40 to 50s.    5. Tolerating adequate PO diet and medications: Yes    6. Return to near baseline physical activity or neurologic status: Yes    Discharge Planner Nurse   Safe discharge environment identified: Yes  Barriers to discharge: Yes       Entered by: Mag Parry 04/16/2022 6:24 AM     Please review provider order for any additional goals.   Nurse to notify provider when observation goals have been met and patient is ready for discharge.Goal Outcome Evaluation:    Pt denies pain. Up with stand by assist. No syncope episode during the shift.

## 2022-04-16 NOTE — CONSULTS
" [facility] HEART [facility]   [address], [place] [address]   [email or web address]   OFFICE: [phone]        Impression and Plan     1.  Syncope.  As noted below, Crista had an episode of syncope while sitting on the sofa at her home.  She does have history of bradycardia for which beta-blocker therapy had to be discontinued.  Holter monitor 3 Dai 2021 had documented tendency toward lower heart rate (see Cardiac Diagnostic section below).  She historically has had well-preserved left ventricular systolic performance with ejection fraction of 60 to 65% by echocardiogram 8 June 2021.  She was noted initially to have some evidence of orthostasis though has responded to crystalloid administration with repeat orthostatic check unremarkable.  I am somewhat suspicious that perhaps bradycardia may have been a contributor though thus far no profound bradycardia, advanced heart block, or marked pauses reported.  Echocardiogram has already been ordered by primary service and will follow up results accordingly.  Continue to monitor on telemetry.    2.  Coronary artery disease.  Crista has known coronary artery disease.  She is known to have subtotal severe right coronary artery stenosis felt unamenable to percutaneous intervention.  Nuclear perfusion imaging study 17 May 2021 revealed a mild area of ischemia in the distal apical/inferior segments.  Troponins this admission x2 suggests against any recent myocardial injury.  Crista states that her angina has been \"good\".  Specifically, she denies any recent episodes and is comfortable with how she is performing in this regard.    3.  Hypertension.  Blood pressure this morning reasonable at 117/59 mmHg.    4.  Dyslipidemia.  Lipid profile 28 March 2021 was at/near target with LDL 82 mg/dL and HDL 55 mg/dL.  Continue statin therapy.    Primary Cardiologist: Dr. Narinder Ryan    History of Present Illness    Crista Harman is a " "86 year old female with known coronary artery disease.  She is known to have subtotal severe right coronary artery stenosis felt unamenable to percutaneous intervention.  Nuclear perfusion imaging study 17 May 2021 revealed a mild area of ischemia in the distal apical/inferior segments.    Crista now presents after having had a syncopal spell at her home.  Her son was with her at the time of the episode patient had reported that she was not \"feeling right\" and felt \"dizzy\".  She had sat down on the sofa.  A few moments later she had loss of consciousness while sitting on the sofa.  Son tried to rouse her though she remained unresponsive.  As a consequence, he called 911.  Under instruction of EMS, he lowered her to the floor and lifted her legs and after approximately 4-5 minutes she regained consciousness.  No tonic/clonic type activity noted.  No trauma was suffered.  Parenthetically, patient had been on beta-blocker therapy in the past though this required discontinuation due to bradycardia.    Further review of systems is otherwise negative/noncontributory (medical record and 13 point review of systems reviewed as well and pertinent positives noted).    Cardiac Diagnostics   Telemetry (personally reviewed): Sinus rhythm with heart rate currently in the 50s.    Echocardiogram 8 June 2021:  Normal left ventricular size and systolic performance with ejection fraction of 60 to 65%.  Trivial aortic insufficiency.  Normal right ventricular size and systolic performance.  Mild left atrial enlargement.  Region of normal dimension.  Right ventricular systolic pressure relative to right to pressure is mildly increased.  Pulmonary artery pressure estimated at 35-40 mmHg plus right to pressure.    Nuclear perfusion imaging study 17 May 2021:  There is mild ischemia in the distal apical and inferior segment(s) of the left ventricle.   The left ventricular ejection fraction at stress is 66%.  The patient is at a low risk of " future cardiac ischemic events.    Holter monitor 3 Dai 2021:  Monitoring started at 8:35 AM and continued for 23 hr 59 min. The average heart rate was 56 BPM. The minimum heart rate was 36 BPM, occurring at 11:24:13 PM. The maximum heart rate was 109 BPM, occurring at 10:29:43 AM.   The patient's rhythm included 17 hr 43 min 31 sec of bradycardia. The slowest single episode of bradycardia occurred at 9:38:23 PM, lasting 1 hr 51 min 2 sec, with minimum heart rate of 36 BPM.   Supraventricular ectopic activity consisted of 1588 beats, of which, 150 were in atrial couplets, 3 were late beats, 1235 were single PACs, 6 were in bigeminy, 194 were in trigeminy. The longest R-R interval was 2.0 seconds occurring at 11:43:06 PM. The longest N-N interval was 2.0 seconds occurring at 11:43:06 PM.    Twelve-lead ECG (personally reviewed) 15 April 2022: Sinus rhythm with heart rate of 60 bpm.  Mild nonspecific T wave changes.    Medical History  Surgical History   Past Medical History:   Diagnosis Date     Acute non-ST elevation myocardial infarction (NSTEMI) (H) 3/28/2021     CAD (coronary artery disease)      CAD (coronary artery disease)      Colitis 1/22/2015     Coronary artery disease due to lipid rich plaque 12/18/2012     Dyslipidemia, goal LDL below 70 12/18/2012     Essential hypertension 12/18/2012     History of heart attack      History of tobacco abuse      HTN (hypertension)      Hyperlipidemia      Hypertension      Peripheral vascular disease (H)       Past Surgical History:   Procedure Laterality Date     APPENDECTOMY       BACK SURGERY       CORONARY STENT PLACEMENT      2      CV CORONARY ANGIOGRAM N/A 3/28/2021    Procedure: Coronary Angiogram;  Surgeon: Narinder Ryan MD;  Location: [facility] Cardiac Cath Lab;  Service: Cardiology     CV LEFT HEART CATHETERIZATION WITH LEFT VENTRICULOGRAM N/A 3/28/2021    Procedure: Left Heart Catheterization with Left Ventriculogram;  Surgeon: Narinder Ryan MD;   "Location: [facility] Cardiac Cath Lab;  Service: Cardiology     femoral stents  2011     IR ABDOMINAL AORTOGRAM  8/25/2011     IR MISCELLANEOUS PROCEDURE  8/25/2011     OOPHORECTOMY       STENT,CORONARY, S660 24/30 2007     VASCULAR SURGERY      stents legs           Family History/Social History/Risk Factors   Patient does not smoke.  Family history reviewed, and pertinent positives noted.  Family History   Problem Relation Age of Onset     Coronary Artery Disease Father      Prostate Cancer Father      Diabetes No family hx of      Hypertension No family hx of      Breast Cancer No family hx of      Colon Cancer No family hx of      Other Cancer No family hx of      Kidney Disease Mother      Heart Disease Father      Heart Disease Brother      Hypertension Brother         Physical Examination   /59 (BP Location: Left arm, Patient Position: Supine)   Pulse 52   Temp 98.4  F (36.9  C) (Oral)   Resp 22   Ht 1.549 m (5' 1\")   Wt 47.6 kg (105 lb)   SpO2 97%   BMI 19.84 kg/m    Wt Readings from Last 5 Encounters:   04/15/22 47.6 kg (105 lb)   07/06/21 46.4 kg (102 lb 6.4 oz)   06/14/21 46.5 kg (102 lb 9.6 oz)   06/10/21 45.8 kg (101 lb)   04/15/21 48.1 kg (106 lb)     Wt Readings from Last 3 Encounters:   04/15/22 47.6 kg (105 lb)   07/06/21 46.4 kg (102 lb 6.4 oz)   06/14/21 46.5 kg (102 lb 9.6 oz)       Intake/Output Summary (Last 24 hours) at 4/16/2022 0712  Last data filed at 4/16/2022 0651  Gross per 24 hour   Intake 364 ml   Output --   Net 364 ml     The patient is alert and oriented times three. Sclerae are anicteric. Mucosal membranes are moist. Jugular venous pressure is normal. No significant adenopathy/thyromegally appreciated. Lungs are fairly clear with reasonable expansion. On cardiovascular exam, the patient has a regular S1 and S2. Abdomen is soft and non-tender. Extremities reveal no clubbing, cyanosis, or edema.         Imaging      Chest radiograph 15 April 2022:  Single AP view of the " chest were obtained.   Cardiomediastinal silhouette is within normal limits.   Atherosclerotic vascular calcification of the aortic knob.   No suspicious focal pulmonary opacities.   No significant pleural effusion or pneumothorax.    CT scan of head 15 April 2022:  No acute intracranial process.  Mild to moderate chronic small vessel ischemic disease and mild generalized brain parenchymal volume loss.    Lab Results     Recent Labs   Lab 04/15/22  2026   WBC 5.6   HGB 12.1   MCV 92         POTASSIUM 3.4*   CHLORIDE 105   CO2 27   BUN 10   CR 0.80   ANIONGAP 9   ELEANOR 9.1   GLC 94   ALBUMIN 4.2   PROTTOTAL 6.7   BILITOTAL 0.8   ALKPHOS 46   ALT 14   AST 22     Recent Labs   Lab Test 05/16/21  1130   *     No lab results found in last 7 days.    Invalid input(s): LDLCALC  Lab Results   Component Value Date     04/15/2022    .0 01/14/2016    CO2 27 04/15/2022    CO2 32.0 01/14/2016    BUN 10 04/15/2022    BUN 9.0 01/14/2016     Lab Results   Component Value Date    WBC 5.6 04/15/2022    HGB 12.1 04/15/2022    HCT 35.5 04/15/2022    MCV 92 04/15/2022     04/15/2022     Lab Results   Component Value Date    CHOL 148 03/28/2021    CHOL 198.0 01/14/2016    TRIG 57 03/28/2021    TRIG 187.0 01/14/2016    HDL 55 03/28/2021    HDL 72.0 01/14/2016     Recent Labs   Lab Test 03/28/21  0200 04/02/18  1422 01/14/16  0951   CHOL 148   < > 198.0   HDL 55   < > 72.0   LDL 82   < > 88.0   TRIG 57   < > 187.0*   CHOLHDLRATIO  --   --  2.7    < > = values in this interval not displayed.     Lab Results   Component Value Date    INR 1.02 05/16/2021     Lab Results   Component Value Date    TROPONINI 0.01 04/16/2022    TROPONINI <0.01 04/15/2022    TROPONINI 0.02 06/07/2021     Lab Results   Component Value Date    TSH 9.15 04/15/2022         Current Inpatient Scheduled Medications   Scheduled Meds:    aspirin  81 mg Oral Daily     atorvastatin  80 mg Oral Daily     clopidogrel  75 mg Oral Daily      [Held by provider] donepezil  5 mg Oral At Bedtime     isosorbide mononitrate  60 mg Oral QPM     pantoprazole  40 mg Oral QAM AC     [Held by provider] QUEtiapine  25 mg Oral At Bedtime     Continuous Infusions:    sodium chloride 100 mL/hr at 04/16/22 0621          Medications Prior to Admission   Prior to Admission medications    Medication Sig Start Date End Date Taking? Authorizing Provider   aspirin 81 MG EC tablet Take 81 mg by mouth daily   Yes Unknown, Entered By History   atorvastatin (LIPITOR) 80 MG tablet Take 80 mg by mouth daily   Yes Unknown, Entered By History   clopidogrel (PLAVIX) 75 MG tablet Take 75 mg by mouth daily   Yes Unknown, Entered By History   donepezil (ARICEPT) 5 MG tablet Take 5 mg by mouth At Bedtime   Yes Unknown, Entered By History   isosorbide mononitrate (IMDUR) 30 MG 24 hr tablet Take 60 mg by mouth every evening   Yes Unknown, Entered By History   omeprazole (PRILOSEC) 20 MG DR capsule TAKE 1 CAPSULE(20 MG) BY MOUTH DAILY BEFORE BREAKFAST 11/26/21  Yes Ryann Hodge MD   QUEtiapine (SEROQUEL) 25 MG tablet TAKE 1/2 TO 1 TABLET EVERY EVENING AROUND DINNER 1/24/22  Yes Ryann Hodge MD

## 2022-04-16 NOTE — ED PROVIDER NOTES
Emergency Department Encounter   NAME: Crista Harman ; AGE: 86 year old female ; YOB: 1935 ; MRN: [id number] ; EVALUATION DATE & TIME: 4/15/2022  8:21 PM ; PCP: Ryann Hodge   ED PROVIDER: Liat Ugalde PA-C    Chief Complaint   Patient presents with     Syncope       Medical Decision Making & Final Diagnosis     1. Syncope, unspecified syncope type         ED Course as of 04/15/22 2322   Fri Apr 15, 2022   2058 Crista is a 86 year old female with a relevant PMH of CAD s/p stenting, HLD, CKD, HTN, h/o tobacco use who presents to the ED for evaluation of syncope.     My exam is notable for /92 and otherwise nl vital signs in a nontoxic appearing elderly woman. Murmur appreciated.    2059 I considered multiple diagnoses including:  ACS, cardiac arrhthymias, aortic dissection, massive PE, aortic stenosis, vasovagal syncope, orthostatic hypotension, volume depletion (dehydration, GI bleed, sepsis), basilar CVA,SAH, seizure, medication effect, and other      2319 Lower suspicion for ACS as source of syncope.  She denies any chest pain, palpitations, lightheadedness, or persistence of symptoms.  EKG is nonischemic and initial troponin negative.  Electrolytes grossly normal.  Mild hypokalemia with potassium 3.4 but no other significant derangement.  No severe anemia or anything on my exam to suggest acute heart failure.  Initial TSH is elevated but free T4 normal.  No history of thyroid disease.   chest x-ray without acute abnormality to explain syncope.  I did obtain a CT scan of her head given she endorsed a history that was not consistent with the patient's son's history and she may be fell 2 weeks ago.  This is reassuringly negative for acute abnormality.  No focal neurologic deficits on my exam.  Nothing to suggest SAH on CT and she presented less than an hour after symptoms started.  Clinically, she is slightly dehydrated.  She was given a 500 cc bag of fluids for that.  Considered  medication effect, she does not take any blood pressure medications in the evening but this certainly still on the differential.  Low suspicion of aortic dissection, no ripping chest or abdominal pain.  No risk factors for PE.  No pleuritic chest pain or shortness of breath.  No hemoptysis.  No tachycardia, tachypnea, or hypotension.  Do not believe there is indication for D-dimer.  No clear vasovagal source and she syncopized after sitting down.  No murmur suggestive of aortic stenosis.  Cardiac arrhythmia still certainly on the differential.  Given her age and comorbidities I do think observation admission for telemetry monitoring is indicated.  Discussed with the patient and her son at the bedside and they are agreeable to the plan.            ED Course   8:33 PM I met and introduced myself to the patient. I gathered initial history and performed my physical exam. We discussed plan for initial workup.   I did see the patient while wearing full COVID-compliant PPE.  9:17 PM Staffed with Dr. Carr  10 PM updated pt and her son. They are agreeable to obs admit but refuse transfer  10:24 PM Discussed with Dr. Sanford who is agreeable to admit and boarding in the ED    MEDICATIONS GIVEN IN THE EMERGENCY:   Medications   potassium chloride ER (KLOR-CON M) CR tablet 40 mEq (has no administration in time range)   0.9% sodium chloride BOLUS (500 mLs Intravenous New Bag 4/15/22 2205)      NEW PRESCRIPTIONS STARTED AT TODAY'S ER VISIT:  New Prescriptions    No medications on file     =================================================================   History   Patient information was obtained from: patient and patients son Osito   Use of Intrepreter: N/A    Crista Harman is a 86 year old female with a relevant PMH of CAD s/p stenting, HLD, CKD, HTN, h/o tobacco use who presents to the ED for evaluation of syncope.   Son reports around 7:15P his mom was walking from her room down the smith and into the living room.  She  sat on the couch and told him that she did not feel good and then she became unresponsive for him.  He called 911 and at their direction laid her down flat on the couch.  He notes about 5 minutes where she was not responsive prior to regaining consciousness.  He denied any cyanosis or limb jerking.  No incontinence.  She reports that they were walking outside when she began to feel lightheaded and like she might pass out and sat down.  She denies chest pain, difficulty breathing, palpitations, vision changes, or severe headache prior to syncope.  She initially reported that she did not think she lost consciousness.  She had taken her evening medications about 15 to 20 minutes prior to this event.  No recent medication changes or other complaints.  She reports she has not ate as much frequently as she is not been hungry.  She reports she has been feeling well for the last few days.  About a week and a half ago she states she slipped getting out of the tub and may have hit her head. Currently she denies any complaints outside of brain fog. No h/o VTE, recent surgery or immobilization, or cancer diagnosis or treatment in the last 6 months.   ______________________________________________________________________  Past Medical History:   Diagnosis Date     Acute non-ST elevation myocardial infarction (NSTEMI) (H) 3/28/2021     CAD (coronary artery disease)      CAD (coronary artery disease)      Colitis 1/22/2015     Coronary artery disease due to lipid rich plaque 12/18/2012     Dyslipidemia, goal LDL below 70 12/18/2012     Essential hypertension 12/18/2012     History of heart attack      History of tobacco abuse      HTN (hypertension)      Hyperlipidemia      Hypertension      Peripheral vascular disease (H)         Past Surgical History:   Procedure Laterality Date     APPENDECTOMY       BACK SURGERY       CORONARY STENT PLACEMENT      2      CV CORONARY ANGIOGRAM N/A 3/28/2021    Procedure: Coronary Angiogram;   Surgeon: Narinder Ryan MD;  Location: [facility] Cardiac Cath Lab;  Service: Cardiology     CV LEFT HEART CATHETERIZATION WITH LEFT VENTRICULOGRAM N/A 3/28/2021    Procedure: Left Heart Catheterization with Left Ventriculogram;  Surgeon: Narinder Ryan MD;  Location: [facility] Cardiac Cath Lab;  Service: Cardiology     femoral stents  2011     IR ABDOMINAL AORTOGRAM  8/25/2011     IR MISCELLANEOUS PROCEDURE  8/25/2011     OOPHORECTOMY       STENT,CORONARY, S660 24/30 2007     VASCULAR SURGERY      stents legs        Family History   Problem Relation Age of Onset     Coronary Artery Disease Father      Prostate Cancer Father      Diabetes No family hx of      Hypertension No family hx of      Breast Cancer No family hx of      Colon Cancer No family hx of      Other Cancer No family hx of      Kidney Disease Mother      Heart Disease Father      Heart Disease Brother      Hypertension Brother        Social History     Tobacco Use     Smoking status: Former Smoker     Types: Cigarettes, Cigarettes     Quit date: 1/22/2007     Years since quitting: 15.2     Smokeless tobacco: Never Used   Substance Use Topics     Alcohol use: Yes     Comment: Alcoholic Drinks/day: occasional beer or bloody randall     Drug use: No       REVIEW OF SYSTEMS:    Review of Systems   Constitutional: Negative for chills and fever.   HENT: Negative for sore throat.    Eyes: Negative for visual disturbance.   Respiratory: Negative for cough and shortness of breath.    Cardiovascular: Negative for chest pain.   Gastrointestinal: Negative for abdominal pain, diarrhea, nausea and vomiting.   Genitourinary: Negative for dysuria, frequency, hematuria and urgency.   Musculoskeletal: Negative for myalgias.   Skin: Negative for rash.   Neurological: Positive for syncope and light-headedness. Negative for headaches.   Psychiatric/Behavioral: The patient is not nervous/anxious.    All other systems reviewed and are negative.        Physical Exam  "  BP (!) 159/73   Pulse 62   Temp 97.6  F (36.4  C) (Oral)   Resp (!) 36   Ht 1.549 m (5' 1\")   Wt 47.6 kg (105 lb)   SpO2 99%   BMI 19.84 kg/m      Physical Exam  Constitutional:       General: She is not in acute distress.     Appearance: Normal appearance.      Comments: /92 and otherwise nl vital signs in a nontoxic appearing elderly woman.    HENT:      Head: Normocephalic.   Eyes:      Conjunctiva/sclera: Conjunctivae normal.   Cardiovascular:      Rate and Rhythm: Normal rate and regular rhythm.      Heart sounds: Murmur heard.      Comments: No JVD  Pulmonary:      Effort: Pulmonary effort is normal. No respiratory distress.      Breath sounds: Normal breath sounds. No wheezing, rhonchi or rales.   Abdominal:      General: There is no distension.      Palpations: Abdomen is soft.      Tenderness: There is no abdominal tenderness. There is no guarding or rebound.   Musculoskeletal:         General: No swelling or deformity. Normal range of motion.      Cervical back: Normal range of motion.      Right lower leg: No edema.      Left lower leg: No edema.   Skin:     General: Skin is warm.   Neurological:      General: No focal deficit present.      Mental Status: She is alert.   Psychiatric:         Mood and Affect: Mood normal.         Lab Work (Reviewed and Interpreted):   Labs Ordered and Resulted from Time of ED Arrival to Time of ED Departure   BASIC METABOLIC PANEL - Abnormal       Result Value    Sodium 141      Potassium 3.4 (*)     Chloride 105      Carbon Dioxide (CO2) 27      Anion Gap 9      Urea Nitrogen 10      Creatinine 0.80      Calcium 9.1      Glucose 94      GFR Estimate 72     TSH WITH FREE T4 REFLEX - Abnormal    TSH 9.15 (*)    CBC WITH PLATELETS AND DIFFERENTIAL - Abnormal    WBC Count 5.6      RBC Count 3.88      Hemoglobin 12.1      Hematocrit 35.5      MCV 92      MCH 31.2      MCHC 34.1      RDW 19.3 (*)     Platelet Count 193      % Neutrophils 56      % Lymphocytes 36  "     % Monocytes 8      % Eosinophils 0      % Basophils 0      % Immature Granulocytes 0      NRBCs per 100 WBC 0      Absolute Neutrophils 3.1      Absolute Lymphocytes 2.0      Absolute Monocytes 0.5      Absolute Eosinophils 0.0      Absolute Basophils 0.0      Absolute Immature Granulocytes 0.0      Absolute NRBCs 0.0     MAGNESIUM - Normal    Magnesium 2.3     TROPONIN I - Normal    Troponin I <0.01     T4 FREE - Normal    Free T4 1.00     HEPATIC FUNCTION PANEL - Normal    Bilirubin Total 0.8      Bilirubin Direct 0.3      Protein Total 6.7      Albumin 4.2      Alkaline Phosphatase 46      AST 22      ALT 14     T4 FREE   T3 TOTAL   COVID-19 VIRUS (CORONAVIRUS) BY PCR       Imaging (Reviewed and Interpreted):   XR Chest 1 View   Final Result   IMPRESSION: Single AP view of the chest were obtained. Cardiomediastinal silhouette is within normal limits. Atherosclerotic vascular calcification of the aortic knob. No suspicious focal pulmonary opacities. No significant pleural effusion or    pneumothorax.      Head CT w/o contrast   Final Result   IMPRESSION:   1.  No acute intracranial process.   2.  Mild to moderate chronic small vessel ischemic disease and mild generalized brain parenchymal volume loss.          EKG (Reviewed and Interpreted):   NSR  No acute ST elevation or depression  No pathologic arrhythmia  No significant change from prior EKG Jun 2021  EKG results reviewed and interpreted by Dr. Carr, ED MD.     Liat Ugalde PA-C   Emergency Medicine   [facility] EMERGENCY DEPARTMENT  [address] [facility] [id number] [phone]  Dept: [phone]        Liat Ugalde PA-C  04/15/22 4957

## 2022-04-16 NOTE — PLAN OF CARE
PRIMARY DIAGNOSIS: SYNCOPE/TIA  OUTPATIENT/OBSERVATION GOALS TO BE MET BEFORE DISCHARGE:  1. Orthostatic performed: Yes:          Lying Orthostatic BP: 118/56         Sitting Orthostatic BP: 109/69         Standing Orthostatic BP: 136/61     2. Diagnostic testing complete & at baseline neurologic testing: Yes    3. Cleared by consultants (if involved): No    4. Interpretation of cardiac rhythm per telemetry tech: Sinus bradycardia.     5. Tolerating adequate PO diet and medications: Yes    6. Return to near baseline physical activity or neurologic status: Yes    Discharge Planner Nurse   Safe discharge environment identified: Yes  Barriers to discharge: Yes, not cleared for discharge, cardiology consult in the morning.        Entered by: Mag Parry 04/16/2022 3:05 AM     Please review provider order for any additional goals.   Nurse to notify provider when observation goals have been met and patient is ready for discharge.Goal Outcome Evaluation:

## 2022-04-17 VITALS
DIASTOLIC BLOOD PRESSURE: 76 MMHG | BODY MASS INDEX: 17.97 KG/M2 | TEMPERATURE: 97.4 F | WEIGHT: 95.2 LBS | HEIGHT: 61 IN | OXYGEN SATURATION: 96 % | RESPIRATION RATE: 18 BRPM | HEART RATE: 53 BPM | SYSTOLIC BLOOD PRESSURE: 154 MMHG

## 2022-04-17 LAB — POTASSIUM BLD-SCNC: 3.6 MMOL/L (ref 3.5–5)

## 2022-04-17 PROCEDURE — 250N000013 HC RX MED GY IP 250 OP 250 PS 637: Performed by: STUDENT IN AN ORGANIZED HEALTH CARE EDUCATION/TRAINING PROGRAM

## 2022-04-17 PROCEDURE — 99217 PR OBSERVATION CARE DISCHARGE: CPT | Performed by: INTERNAL MEDICINE

## 2022-04-17 PROCEDURE — G0378 HOSPITAL OBSERVATION PER HR: HCPCS

## 2022-04-17 PROCEDURE — 99214 OFFICE O/P EST MOD 30 MIN: CPT | Performed by: INTERNAL MEDICINE

## 2022-04-17 RX ADMIN — PANTOPRAZOLE SODIUM 40 MG: 20 TABLET, DELAYED RELEASE ORAL at 06:18

## 2022-04-17 RX ADMIN — CLOPIDOGREL BISULFATE 75 MG: 75 TABLET ORAL at 07:57

## 2022-04-17 RX ADMIN — ASPIRIN 81 MG: 81 TABLET, COATED ORAL at 07:57

## 2022-04-17 RX ADMIN — ATORVASTATIN CALCIUM 80 MG: 40 TABLET, FILM COATED ORAL at 07:57

## 2022-04-17 NOTE — PLAN OF CARE
PRIMARY DIAGNOSIS: SYNCOPE/TIA  OUTPATIENT/OBSERVATION GOALS TO BE MET BEFORE DISCHARGE:  1. Orthostatic performed: Yes:          Lying Orthostatic BP: 118/56         Sitting Orthostatic BP: 109/69         Standing Orthostatic BP: 136/61     2. Diagnostic testing complete & at baseline neurologic testing: Yes    3. Cleared by consultants (if involved): No    4. Interpretation of cardiac rhythm per telemetry tech: sinus bradycardia    5. Tolerating adequate PO diet and medications: Yes    6. Return to near baseline physical activity or neurologic status:  patient was still getting some light headedness when going from lying to sitting.    Discharge Planner Nurse   Safe discharge environment identified: Yes  Barriers to discharge: Yes, positive orthostatics, sinus bradycardia       Entered by: Cecy Shea 04/17/2022 1:47 AM     Please review provider order for any additional goals.   Nurse to notify provider when observation goals have been met and patient is ready for discharge.Goal Outcome Evaluation:

## 2022-04-17 NOTE — DISCHARGE INSTRUCTIONS
Heart Clinic: [phone]  Please call Tuesday morning if you have not heard from them on Monday, 4/18

## 2022-04-17 NOTE — PLAN OF CARE
PRIMARY DIAGNOSIS: SYNCOPE/TIA  OUTPATIENT/OBSERVATION GOALS TO BE MET BEFORE DISCHARGE:  1. Orthostatic performed: No    2. Diagnostic testing complete & at baseline neurologic testing: Yes    3. Cleared by consultants (if involved): Yes    4. Interpretation of cardiac rhythm per telemetry tech: sinus alex    5. Tolerating adequate PO diet and medications: Yes    6. Return to near baseline physical activity or neurologic status: Yes    Discharge Planner Nurse   Safe discharge environment identified: Yes  Barriers to discharge: Yes- outpatient event monitor       Entered by: Cherelle Clemons 04/17/2022 8:50 AM     Please review provider order for any additional goals.   Nurse to notify provider when observation goals have been met and patient is ready for discharge.

## 2022-04-17 NOTE — PROGRESS NOTES
Patient reports feeling well today. No chest pain and no SOB. No dizziness. She feels comfortable going home today.    Plan to discharge and outpatient heart monitor discussed with patient's Son, Osito. He agrees with the plan.      [facility] medicine service  Bam Marcelino MD

## 2022-04-17 NOTE — PLAN OF CARE
PRIMARY DIAGNOSIS: SYNCOPE/TIA  OUTPATIENT/OBSERVATION GOALS TO BE MET BEFORE DISCHARGE:  1. Orthostatic performed: Yes:          Lying Orthostatic BP: 118/56         Sitting Orthostatic BP: 109/69         Standing Orthostatic BP: 136/61     2. Diagnostic testing complete & at baseline neurologic testing: Yes    3. Cleared by consultants (if involved): N/A    4. Interpretation of cardiac rhythm per telemetry tech: sinus bradycardia    5. Tolerating adequate PO diet and medications: Yes    6. Return to near baseline physical activity or neurologic status:  had some symptoms going from lying to sitting    Discharge Planner Nurse   Safe discharge environment identified: Yes  Barriers to discharge: Yes       Entered by: Cecy Shea 04/17/2022 4:33 AM     Please review provider order for any additional goals.   Nurse to notify provider when observation goals have been met and patient is ready for discharge.Goal Outcome Evaluation:

## 2022-04-17 NOTE — PROGRESS NOTES
PRIMARY DIAGNOSIS: SYNCOPE/TIA  OUTPATIENT/OBSERVATION GOALS TO BE MET BEFORE DISCHARGE:  1. Orthostatic performed: Yes:          Lying Orthostatic BP: 118/56         Sitting Orthostatic BP: 109/69         Standing Orthostatic BP: 136/61     2. Diagnostic testing complete & at baseline neurologic testing: Yes    3. Cleared by consultants (if involved): No    4. Interpretation of cardiac rhythm per telemetry tech: SB.  Trending upper 40's to low 50's.    5. Tolerating adequate PO diet and medications: Yes    6. Return to near baseline physical activity or neurologic status: No    Discharge Planner Nurse   Safe discharge environment identified: No  Barriers to discharge: Yes       Entered by: Gus Montesinos 04/16/2022 10:09 PM   House Resident text messaged regarding low HR.  No S&S secondary to bradycardia, observed.   Please review provider order for any additional goals.   Nurse to notify provider when observation goals have been met and patient is ready for discharge.

## 2022-04-17 NOTE — PLAN OF CARE
Problem: Plan of Care - These are the overarching goals to be used throughout the patient stay.    Goal: Readiness for Transition of Care  Outcome: Adequate for Care Transition   Discharging home this morning, son to transport. Plan for outpatient event monitor, heart clinic phone number given. Follow up appointment made with PCP. AVS reviewed, no medication changes at this time. All belongings returned. All questions/ concerns addressed at this time.

## 2022-04-17 NOTE — DISCHARGE SUMMARY
[facility]  Hospitalist Discharge Summary      Date of Admission:  4/15/2022  Date of Discharge:  4/17/2022  Discharging Provider: Bam Marcelino MD  Discharge Service: Hospitalist Service    Discharge Diagnoses     Principal Problem:    Syncope, unspecified syncope type  Active Problems:    Essential hypertension, benign    Coronary atherosclerosis  Subclinical hypothyroidism  Hypokalemia  Sinus bradycardia    Follow-ups Needed After Discharge   Follow-up Appointments     Follow-up and recommended labs and tests       You are recommended to wear a heart monitor for 30 days. You will be   contacted about the appointment to have the heart monitor connected. After   the heart monitor is completed, follow up with cardiologist in about 2-4   weeks to review the result.   [facility]  Dr Adrian Torres  For questions, please call .           Discharge Disposition   Discharged to home  Condition at discharge: Stable      Hospital Course     Patient is an 86-year-old female with history of coronary artery disease, hypertension, neurocognitive decline presents with syncopal episode at home. In ER, CT head shows no acute changes.  EKG indicates sinus bradycardia.  Her troponin was within normal range.  After admission, patient did not have recurrent syncope episode. She was monitored with telemetry. Telemetry reveals some tendency towards sinus bradycardia, though slowest heart rates primarily occurred at night.  There are no evidence of conduction disease/AV block and no significant pauses.  At this time, there is insufficient findings to recommend permanent pacemaker placement.  Cardiology recommended patient to complete 30-day 1 patch cardiac monitor and follow-up outpatient.  Patient had hypokalemia on admission. Potassium returned to normal range after replacement. Patient was also found to have elevated TSH to 9.1 with normal total T3 and free T4.  Patient is  recommended to follow-up with PCP to recheck thyroid function test.  Patient was discharged home in a stable condition.      Consultations This Hospital Stay   CARDIOLOGY IP CONSULT  SOCIAL WORK IP CONSULT    Code Status   Prior    Time Spent on this Encounter   I, Bam Marcelino MD, personally saw the patient today and spent greater than 30 minutes discharging this patient.       Bam Marcelino MD  [facility]  [address] [place] [id number]  Phone: [phone]  Fax: [phone]  ______________________________________________________________________    Physical Exam   Vital Signs: Temp: 97.4  F (36.3  C) Temp src: Oral BP: (!) 154/76 Pulse: 53   Resp: 18 SpO2: 96 % O2 Device: None (Room air)    Weight: 95 lbs 3.2 oz    General appearance: not in acute distress  HEENT: PERRL, EOMI  Lungs: Clear breath sounds in bilateral lung fields  Cardiovascular: Regular rate and rhythm, normal S1-S2  Abdomen: Soft, non tender, no distension  Musculoskeletal: No joint swelling  Skin: No rash and no edema  Neurology: AAO ×3.  Cranial nerves II - XII normal.  Normal muscle strength in all four extremities.       Primary Care Physician   Ryann Hodge    Discharge Orders      Reason for your hospital stay    * Admitted for syncope.     Activity    Your activity upon discharge: activity as tolerated     Follow-up and recommended labs and tests     You are recommended to wear a heart monitor for 30 days. You will be contacted about the appointment to have the heart monitor connected. After the heart monitor is completed, follow up with cardiologist in about 2-4 weeks to review the result.   [facility]  Dr Adrian Torres  For questions, please call .     Diet    Follow this diet upon discharge: Low Saturated Fat Na <2400 mg       Significant Results and Procedures   Most Recent 3 CBC's:Recent Labs   Lab Test 04/15/22  2026 06/07/21  0120  05/16/21  1130   WBC 5.6 5.7 5.0   HGB 12.1 13.2 12.7   MCV 92 91 93    240 234     Most Recent 3 BMP's:Recent Labs   Lab Test 04/16/22  2359 04/15/22  2026 06/14/21  1352 06/10/21  1114 06/07/21  0120 05/16/21  1130   NA  --  141  --   --  140 141   POTASSIUM 3.6 3.4* 4.5   < > 2.6* 3.4*   CHLORIDE  --  105  --   --  99 102   CO2  --  27  --   --  30 24   BUN  --  10  --   --  11 14   CR  --  0.80  --   --  0.94 0.78   ANIONGAP  --  9  --   --  11 15   ELEANOR  --  9.1  --   --  10.0 9.0   GLC  --  94  --   --  110 129*    < > = values in this interval not displayed.       CT HEAD W/O CONTRAST    FINDINGS:  INTRACRANIAL CONTENTS: No intracranial hemorrhage, extraaxial collection, or mass effect.  No CT evidence of acute infarct. Mild to moderate presumed chronic small vessel ischemic changes throughout the cerebral hemispheric white matter bilaterally.   Chronic left thalamic lacunar infarct. Mild generalized volume loss. No hydrocephalus.      VISUALIZED ORBITS/SINUSES/MASTOIDS: No intraorbital abnormality. No paranasal sinus mucosal disease. No middle ear or mastoid effusion.     BONES/SOFT TISSUES: No acute abnormality.                                                                    IMPRESSION:  1.  No acute intracranial process.  2.  Mild to moderate chronic small vessel ischemic disease and mild generalized brain parenchymal volume loss.      Echocardiogram:    Interpretation Summary     1. Normal left ventricular size and systolic performance with a visually  estimated ejection fraction of 60%.  2. There is mild to moderate aortic insufficiency.  3. There is mild, to perhaps mild-moderate, tricuspid insufficiency.  4. Normal right ventricular size and systolic performance.  5. There is mild to moderate left atrial enlargement.      Discharge Medications   Discharge Medication List as of 4/17/2022  9:42 AM      CONTINUE these medications which have NOT CHANGED    Details   aspirin 81 MG EC tablet Take 81  mg by mouth daily, Historical      atorvastatin (LIPITOR) 80 MG tablet Take 80 mg by mouth daily, Historical      clopidogrel (PLAVIX) 75 MG tablet Take 75 mg by mouth daily, Historical      donepezil (ARICEPT) 5 MG tablet Take 5 mg by mouth At Bedtime, Historical      isosorbide mononitrate (IMDUR) 30 MG 24 hr tablet Take 60 mg by mouth every evening, Historical      omeprazole (PRILOSEC) 20 MG DR capsule TAKE 1 CAPSULE(20 MG) BY MOUTH DAILY BEFORE BREAKFAST, Disp-90 capsule, R-1, E-Prescribe      QUEtiapine (SEROQUEL) 25 MG tablet TAKE 1/2 TO 1 TABLET EVERY EVENING AROUND DINNER, Disp-90 tablet, R-0, E-PrescribeAdvise to schedule med check           Allergies   No Known Allergies

## 2022-04-17 NOTE — PROGRESS NOTES
"[facility] HEART [facility]   [address], [place] [address]   [email or web address]   OFFICE: [phone]            Impression and Plan     1.  Syncope.  As noted below, Crista had an episode of syncope while sitting on the sofa at her home.  She does have history of bradycardia for which beta-blocker therapy had to be discontinued.  Holter monitor 3 Dai 2021 had documented tendency toward lower heart rate (see Cardiac Diagnostic section below).  She historically has had well-preserved left ventricular systolic performance with ejection fraction of 60 to 65% by echocardiogram 8 June 2021.  Echocardiogram this admission again confirms normal left ventricular function with ejection fraction of 60%.     On presentation, Crista was noted to have some evidence of orthostasis though has responded to crystalloid administration with repeat orthostatic check unremarkable.     Telemetry reviewed and does reveal some tendency toward sinus bradycardia, though slowest heart rates primarily at night.  No evidence of conduction disease/AV block.  No significant pauses.  At this time, feel there is insufficient findings to recommend permanent pacemaker placement.  Plan:    Outpatient 30-day one-patch monitor (discussed with patient and I will arrange).    2.  Coronary artery disease.  Crista has known coronary artery disease.  She is known to have subtotal severe right coronary artery stenosis felt unamenable to percutaneous intervention.  Nuclear perfusion imaging study 17 May 2021 revealed a mild area of ischemia in the distal apical/inferior segments.  Troponins this admission x3 suggests against any recent myocardial injury.  Crista states that her angina has been \"good\".  Specifically, she denies any recent episodes and is comfortable with how she is performing in this regard.  Crista has been maintained on dual antiplatelet therapy.     3.  Hypertension.  Blood pressure this " morning 154/69 mmHg.     4.  Dyslipidemia.  Lipid profile 28 March 2021 was at/near target with LDL 82 mg/dL and HDL 55 mg/dL.    Continue statin therapy.     I would be comfortable with dismissal from hospital from cardiac standpoint.  Again, I will make arrangements for Crista to have an outpatient 30-day one-patch monitor.    Primary Cardiologist: Dr. Narinder Ryan    Subjective     Crista states that she is feeling well this morning.  Denies lightheadedness.  No subjective palpitations.  No chest pain or shortness of breath.    Cardiac Diagnostics   Telemetry (personally reviewed): Sinus rhythm.    Echocardiogram 16 April 2022 (personally reviewed):  1. Normal left ventricular size and systolic performance with a visually estimated ejection fraction of 60%.  2. There is mild to moderate aortic insufficiency.  3. There is mild, to perhaps mild-moderate, tricuspid insufficiency.  4. Normal right ventricular size and systolic performance.  5. There is mild to moderate left atrial enlargement.     Echocardiogram 8 June 2021:  1. Normal left ventricular size and systolic performance with ejection fraction of 60 to 65%.  2. Trivial aortic insufficiency.  3. Normal right ventricular size and systolic performance.  4. Mild left atrial enlargement.  Region of normal dimension.  5. Right ventricular systolic pressure relative to right to pressure is mildly increased.  Pulmonary artery pressure estimated at 35-40 mmHg plus right to pressure.     Nuclear perfusion imaging study 17 May 2021:  1. There is mild ischemia in the distal apical and inferior segment(s) of the left ventricle.   2. The left ventricular ejection fraction at stress is 66%.  3. The patient is at a low risk of future cardiac ischemic events.     Holter monitor 3 Dai 2021:  1. Monitoring started at 8:35 AM and continued for 23 hr 59 min. The average heart rate was 56 BPM. The minimum heart rate was 36 BPM, occurring at 11:24:13 PM. The maximum heart  "rate was 109 BPM, occurring at 10:29:43 AM.   2. The patient's rhythm included 17 hr 43 min 31 sec of bradycardia. The slowest single episode of bradycardia occurred at 9:38:23 PM, lasting 1 hr 51 min 2 sec, with minimum heart rate of 36 BPM.   3. Supraventricular ectopic activity consisted of 1588 beats, of which, 150 were in atrial couplets, 3 were late beats, 1235 were single PACs, 6 were in bigeminy, 194 were in trigeminy. The longest R-R interval was 2.0 seconds occurring at 11:43:06 PM. The longest N-N interval was 2.0 seconds occurring at 11:43:06 PM.     Twelve-lead ECG (personally reviewed) 15 April 2022: Sinus rhythm with heart rate of 60 bpm.  Mild nonspecific T wave changes.       Physical Examination       BP (!) 154/69 (BP Location: Left arm)   Pulse 53   Temp 97.5  F (36.4  C) (Oral)   Resp 18   Ht 1.549 m (5' 1\")   Wt 43.2 kg (95 lb 3.2 oz)   SpO2 94%   BMI 17.99 kg/m          Vitals:    04/15/22 2011 04/16/22 1441 04/17/22 0623   Weight: 47.6 kg (105 lb) 44 kg (96 lb 14.4 oz) 43.2 kg (95 lb 3.2 oz)              Intake/Output Summary (Last 24 hours) at 4/17/2022 0701  Last data filed at 4/17/2022 0600  Gross per 24 hour   Intake 420 ml   Output 700 ml   Net -280 ml       The patient is alert and oriented times three. Sclerae are anicteric. Mucosal membranes are moist. Jugular venous pressure is normal. No significant adenopathy/thyromegally appreciated. Lungs are clear with good expansion. On cardiovascular exam, the patient has a regular S1 and S2. Abdomen is soft and non-tender. Extremities reveal no clubbing, cyanosis, or edema.         Imaging       Chest radiograph 15 April 2022:  1. Single AP view of the chest were obtained.   2. Cardiomediastinal silhouette is within normal limits.   3. Atherosclerotic vascular calcification of the aortic knob.   4. No suspicious focal pulmonary opacities.   5. No significant pleural effusion or pneumothorax.     CT scan of head 15 April 2022:  1. No " acute intracranial process.  2. Mild to moderate chronic small vessel ischemic disease and mild generalized brain parenchymal volume loss.       Lab Results     Recent Labs   Lab 04/16/22  2359 04/15/22  2026   WBC  --  5.6   HGB  --  12.1   MCV  --  92   PLT  --  193   NA  --  141   POTASSIUM 3.6 3.4*   CHLORIDE  --  105   CO2  --  27   BUN  --  10   CR  --  0.80   ANIONGAP  --  9   ELEANOR  --  9.1   GLC  --  94   ALBUMIN  --  4.2   PROTTOTAL  --  6.7   BILITOTAL  --  0.8   ALKPHOS  --  46   ALT  --  14   AST  --  22     Recent Labs   Lab Test 05/16/21  1130   *     No lab results found in last 7 days.    Invalid input(s): LDLCALC  Lab Results   Component Value Date     04/15/2022    .0 01/14/2016    CO2 27 04/15/2022    CO2 32.0 01/14/2016    BUN 10 04/15/2022    BUN 9.0 01/14/2016     Lab Results   Component Value Date    WBC 5.6 04/15/2022    HGB 12.1 04/15/2022    HCT 35.5 04/15/2022    MCV 92 04/15/2022     04/15/2022     Lab Results   Component Value Date    CHOL 148 03/28/2021    CHOL 198.0 01/14/2016    TRIG 57 03/28/2021    TRIG 187.0 01/14/2016    HDL 55 03/28/2021    HDL 72.0 01/14/2016     Lab Results   Component Value Date    INR 1.02 05/16/2021     Lab Results   Component Value Date    TROPONINI <0.01 04/16/2022    TROPONINI 0.01 04/16/2022    TROPONINI <0.01 04/15/2022     Lab Results   Component Value Date    TSH 9.15 04/15/2022           Current Inpatient Scheduled Medications   Scheduled Meds:    aspirin  81 mg Oral Daily     atorvastatin  80 mg Oral Daily     clopidogrel  75 mg Oral Daily     [Held by provider] donepezil  5 mg Oral At Bedtime     isosorbide mononitrate  60 mg Oral QPM     pantoprazole  40 mg Oral QAM AC     QUEtiapine  25 mg Oral At Bedtime     Continuous Infusions:         Medications Prior to Admission   Prior to Admission medications    Medication Sig Start Date End Date Taking? Authorizing Provider   aspirin 81 MG EC tablet Take 81 mg by mouth daily    Yes Unknown, Entered By History   atorvastatin (LIPITOR) 80 MG tablet Take 80 mg by mouth daily   Yes Unknown, Entered By History   clopidogrel (PLAVIX) 75 MG tablet Take 75 mg by mouth daily   Yes Unknown, Entered By History   donepezil (ARICEPT) 5 MG tablet Take 5 mg by mouth At Bedtime   Yes Unknown, Entered By History   isosorbide mononitrate (IMDUR) 30 MG 24 hr tablet Take 60 mg by mouth every evening   Yes Unknown, Entered By History   omeprazole (PRILOSEC) 20 MG DR capsule TAKE 1 CAPSULE(20 MG) BY MOUTH DAILY BEFORE BREAKFAST 11/26/21  Yes Ryann Hodge MD   QUEtiapine (SEROQUEL) 25 MG tablet TAKE 1/2 TO 1 TABLET EVERY EVENING AROUND DINNER 1/24/22  Yes Ryann Hodge MD

## 2022-04-17 NOTE — PLAN OF CARE
Problem: Dysrhythmia  Goal: Normalized Cardiac Rhythm  Outcome: Ongoing, Progressing     Problem: Fall Injury Risk  Goal: Absence of Fall and Fall-Related Injury  Outcome: Ongoing, Progressing  Intervention: Promote Injury-Free Environment  Recent Flowsheet Documentation  Taken 4/16/2022 1900 by Shruti Cornelius RN  Safety Promotion/Fall Prevention: bed alarm on  Taken 4/16/2022 1520 by Shruti Corneilus RN  Safety Promotion/Fall Prevention: bed alarm on     Problem: Anxiety  Goal: Anxiety Reduction or Resolution  Outcome: Ongoing, Progressing     Problem: Syncope  Goal: Absence of Syncopal Symptoms  Outcome: Ongoing, Progressing   Goal Outcome Evaluation:    Admitted this 86 year old female after family brought her to ER after a syncopal attack @ home.   Patient is on tele monitor & is on sinus bradycardia with HR of 50's. Patient is disoriented with why she is admitted. She could not remember the passing out. She says 'maybe I pass out, not a big deal I wonder why I'm here' To closely monitor pt as she brought out her shoes & hoodies from the cabinet.  Stating when is she going home. Patient's son made aware during his visit.

## 2022-04-29 ENCOUNTER — OFFICE VISIT (OUTPATIENT)
Dept: FAMILY MEDICINE | Facility: CLINIC | Age: 87
End: 2022-04-29
Payer: COMMERCIAL

## 2022-04-29 VITALS
SYSTOLIC BLOOD PRESSURE: 145 MMHG | HEART RATE: 58 BPM | WEIGHT: 96.2 LBS | BODY MASS INDEX: 18.18 KG/M2 | TEMPERATURE: 96.8 F | DIASTOLIC BLOOD PRESSURE: 60 MMHG | OXYGEN SATURATION: 99 % | RESPIRATION RATE: 18 BRPM

## 2022-04-29 DIAGNOSIS — E78.00 PURE HYPERCHOLESTEROLEMIA: ICD-10-CM

## 2022-04-29 DIAGNOSIS — K21.9 GASTROESOPHAGEAL REFLUX DISEASE WITHOUT ESOPHAGITIS: ICD-10-CM

## 2022-04-29 DIAGNOSIS — I10 ESSENTIAL HYPERTENSION: ICD-10-CM

## 2022-04-29 DIAGNOSIS — F03.91 DEMENTIA WITH BEHAVIORAL DISTURBANCE, UNSPECIFIED DEMENTIA TYPE: ICD-10-CM

## 2022-04-29 DIAGNOSIS — F32.5 MAJOR DEPRESSIVE DISORDER, SINGLE EPISODE, IN REMISSION (H): ICD-10-CM

## 2022-04-29 DIAGNOSIS — Z09 HOSPITAL DISCHARGE FOLLOW-UP: Primary | ICD-10-CM

## 2022-04-29 DIAGNOSIS — I10 ESSENTIAL HYPERTENSION, BENIGN: ICD-10-CM

## 2022-04-29 DIAGNOSIS — R55 SYNCOPE, UNSPECIFIED SYNCOPE TYPE: ICD-10-CM

## 2022-04-29 DIAGNOSIS — R94.6 THYROID FUNCTION TEST ABNORMAL: ICD-10-CM

## 2022-04-29 DIAGNOSIS — I73.9 PERIPHERAL VASCULAR DISEASE (H): ICD-10-CM

## 2022-04-29 DIAGNOSIS — E87.6 HYPOKALEMIA: ICD-10-CM

## 2022-04-29 DIAGNOSIS — F05 SUNDOWNING: ICD-10-CM

## 2022-04-29 LAB
ANION GAP SERPL CALCULATED.3IONS-SCNC: 12 MMOL/L (ref 5–18)
BUN SERPL-MCNC: 12 MG/DL (ref 8–28)
CALCIUM SERPL-MCNC: 10 MG/DL (ref 8.5–10.5)
CHLORIDE BLD-SCNC: 104 MMOL/L (ref 98–107)
CO2 SERPL-SCNC: 27 MMOL/L (ref 22–31)
CREAT SERPL-MCNC: 0.76 MG/DL (ref 0.6–1.1)
GFR SERPL CREATININE-BSD FRML MDRD: 77 ML/MIN/1.73M2
GLUCOSE BLD-MCNC: 110 MG/DL (ref 70–125)
POTASSIUM BLD-SCNC: 4.4 MMOL/L (ref 3.5–5)
SODIUM SERPL-SCNC: 143 MMOL/L (ref 136–145)
TSH SERPL DL<=0.005 MIU/L-ACNC: 1.84 UIU/ML (ref 0.3–5)

## 2022-04-29 PROCEDURE — 36415 COLL VENOUS BLD VENIPUNCTURE: CPT | Performed by: FAMILY MEDICINE

## 2022-04-29 PROCEDURE — 90714 TD VACC NO PRESV 7 YRS+ IM: CPT | Performed by: FAMILY MEDICINE

## 2022-04-29 PROCEDURE — 99214 OFFICE O/P EST MOD 30 MIN: CPT | Mod: 25 | Performed by: FAMILY MEDICINE

## 2022-04-29 PROCEDURE — 80048 BASIC METABOLIC PNL TOTAL CA: CPT | Performed by: FAMILY MEDICINE

## 2022-04-29 PROCEDURE — 90471 IMMUNIZATION ADMIN: CPT | Performed by: FAMILY MEDICINE

## 2022-04-29 PROCEDURE — 84443 ASSAY THYROID STIM HORMONE: CPT | Performed by: FAMILY MEDICINE

## 2022-04-29 RX ORDER — QUETIAPINE FUMARATE 25 MG/1
TABLET, FILM COATED ORAL
Qty: 90 TABLET | Refills: 3 | Status: SHIPPED | OUTPATIENT
Start: 2022-04-29 | End: 2023-09-25

## 2022-04-29 RX ORDER — DONEPEZIL HYDROCHLORIDE 10 MG/1
10 TABLET, FILM COATED ORAL AT BEDTIME
Qty: 90 TABLET | Refills: 3 | Status: SHIPPED | OUTPATIENT
Start: 2022-04-29 | End: 2023-09-25

## 2022-04-29 RX ORDER — ISOSORBIDE MONONITRATE 30 MG/1
60 TABLET, EXTENDED RELEASE ORAL EVERY EVENING
Qty: 90 TABLET | Refills: 1 | Status: SHIPPED | OUTPATIENT
Start: 2022-04-29 | End: 2022-05-02

## 2022-04-29 NOTE — PROGRESS NOTES
Assessment & Plan   Problem List Items Addressed This Visit     Essential hypertension     Take isosorbide 60 mg daily           Relevant Medications    isosorbide mononitrate (IMDUR) 30 MG 24 hr tablet    Major depressive disorder, single episode, in remission (H)     Appears to be in remission.  Was on Lexapro last patient was seen but I cannot find any documentation as to why this could have been canceled and it was not on medication list which was noticed after today's visit.  Could always reinitiate if there is any relapse or if family does indicate she is still on it and needs refill I am happy to refill           Pure hypercholesterolemia    Peripheral vascular disease (H)     Continue with isosorbide and Plavix and aspirin therapy.           Syncope, unspecified syncope type     No remarkable findings seen in the hospital.  Negative work-up but will complete work-up outpatient with 30-day Zio patch           Dementia with behavioral disturbance, unspecified dementia type (H)     Increased dose of donepezil 10 mg daily.  Family will watch for any reported GI discomfort and we can always cut back dose if needed           Relevant Medications    donepezil (ARICEPT) 10 MG tablet    QUEtiapine (SEROQUEL) 25 MG tablet    Hypokalemia     We will recheck potassium level to make sure not continuing to be hypokalemic.  Advised potassium rich foods            Relevant Orders    Basic metabolic panel  (Ca, Cl, CO2, Creat, Gluc, K, Na, BUN) (Completed)    Gastroesophageal reflux disease without esophagitis     Refill omeprazole as needed           Relevant Medications    omeprazole (PRILOSEC) 20 MG DR capsule    Sundowning     Continue with Seroquel  12.5 to 25 mg every evening.  So far no adverse effect           Relevant Medications    QUEtiapine (SEROQUEL) 25 MG tablet      Other Visit Diagnoses     Hospital discharge follow-up    -  Primary    Thyroid function test abnormal        Relevant Orders    TSH with free  T4 reflex (Completed)           Review of prior external note(s) from - Hospital records from [facility]  Review of the result(s) of each unique test - - Imaging of the head, EKG  Ordering of each unique test  Prescription drug management  30 minutes spent on the date of the encounter doing chart review, history and exam, documentation and further activities per the note        See Patient Instructions  Patient Instructions   Use up the donepezil you just picked up and increase that to 2 tablets daily (total ) 10mg daily. And there will be a new prescription at the pharmacy for 10mg daily (one pill daily for future).     Check thyroid level today       No follow-ups on file.    Ryann Hodge MD  [facility]    Jovanni Tobin is a 86 year old who presents for the following health issues follow-up hospital discharge    HPI   Here with son Osito who she lives with (in the past have seen his wife Aniyah).   Was coming down hallway and indicated wasn't feeling well, then slumped over. Non responsive. Was sitting on couch, called 911.   Monday will have Zio patch placed.   No recent illnesses, drinks a little water with pills. Coffee and soda, 1-2 .   Tolerating donepezil.   Quetipaine 25mg nightly, not waking in middle of night.       Hospital Follow-up Visit:    Hospital/Nursing Home/IP Rehab Facility: [facility]  Date of Admission: 4/15/2022  Date of Discharge: 4/17/2022  Reason(s) for Admission: Syncope      Was your hospitalization related to COVID-19? No   Problems taking medications regularly:  None  Medication changes since discharge: None  Problems adhering to non-medication therapy:  None    Summary of hospitalization:  [facility] discharge summary reviewed  Diagnostic Tests/Treatments reviewed.  Follow up needed: Follow-up potassium and event Zio patch monitor  Other Healthcare Providers Involved in Patient s Care:          Cardiology  Update since discharge: improved.    Post Discharge Medication Reconciliation: discharge medications reconciled and changed, per note/orders.  Plan of care communicated with patient         Patient is an 86-year-old female with history of coronary artery disease, hypertension, neurocognitive decline presents with syncopal episode at home. In ER, CT head shows no acute changes.  EKG indicates sinus bradycardia.  Her troponin was within normal range.  After admission, patient did not have recurrent syncope episode. She was monitored with telemetry. Telemetry reveals some tendency towards sinus bradycardia, though slowest heart rates primarily occurred at night.  There are no evidence of conduction disease/AV block and no significant pauses.  At this time, there is insufficient findings to recommend permanent pacemaker placement.  Cardiology recommended patient to complete 30-day 1 patch cardiac monitor and follow-up outpatient.  Patient had hypokalemia on admission. Potassium returned to normal range after replacement. Patient was also found to have elevated TSH to 9.1 with normal total T3 and free T4.  Patient is recommended to follow-up with PCP to recheck thyroid function test.  Patient was discharged home in a stable condition.         Review of Systems complete review of systems reviewed in HPI or otherwise negative      Objective    BP (!) 145/60   Pulse 58   Temp 96.8  F (36  C) (Oral)   Resp 18   Wt 43.6 kg (96 lb 3.2 oz)   SpO2 99%   BMI 18.18 kg/m    Body mass index is 18.18 kg/m .  Physical Exam   GENERAL: healthy, alert and no distress  EYES: Eyes grossly normal to inspection, PERRL and conjunctivae and sclerae normal  NECK: no adenopathy, no asymmetry, masses, or scars and thyroid normal to palpation  RESP: lungs clear to auscultation - no rales, rhonchi or wheezes  CV: Mildly bradycardic rate and rhythm, normal S1 S2, no S3 or S4, no murmur, click or rub, no peripheral edema and  peripheral pulses strong  ABDOMEN: soft, nontender, no hepatosplenomegaly, no masses and bowel sounds normal  MS: no gross musculoskeletal defects noted, no edema  SKIN: no suspicious lesions or rashes  NEURO: Normal strength and tone, mentation intact and speech normal.  Baseline cognitive changes  PSYCH: mentation appears normal, affect normal/bright       Results for orders placed or performed in visit on 04/29/22   Basic metabolic panel  (Ca, Cl, CO2, Creat, Gluc, K, Na, BUN)     Status: Normal   Result Value Ref Range    Sodium 143 136 - 145 mmol/L    Potassium 4.4 3.5 - 5.0 mmol/L    Chloride 104 98 - 107 mmol/L    Carbon Dioxide (CO2) 27 22 - 31 mmol/L    Anion Gap 12 5 - 18 mmol/L    Urea Nitrogen 12 8 - 28 mg/dL    Creatinine 0.76 0.60 - 1.10 mg/dL    Calcium 10.0 8.5 - 10.5 mg/dL    Glucose 110 70 - 125 mg/dL    GFR Estimate 77 >60 mL/min/1.73m2   TSH with free T4 reflex     Status: Normal   Result Value Ref Range    TSH 1.84 0.30 - 5.00 uIU/mL

## 2022-04-29 NOTE — LETTER
May 3, 2022      Crista Harman  [address]  [place] [address]        Dear ,    We are writing to inform you of your test results.    Your test results fall within the expected range(s) or remain unchanged from previous results.  Please continue with current treatment plan.   Follow-up labs were normal.  Thyroid level has resolved.  No follow-up needed in regards to that    Resulted Orders   Basic metabolic panel  (Ca, Cl, CO2, Creat, Gluc, K, Na, BUN)   Result Value Ref Range    Sodium 143 136 - 145 mmol/L    Potassium 4.4 3.5 - 5.0 mmol/L    Chloride 104 98 - 107 mmol/L    Carbon Dioxide (CO2) 27 22 - 31 mmol/L    Anion Gap 12 5 - 18 mmol/L    Urea Nitrogen 12 8 - 28 mg/dL    Creatinine 0.76 0.60 - 1.10 mg/dL    Calcium 10.0 8.5 - 10.5 mg/dL    Glucose 110 70 - 125 mg/dL    GFR Estimate 77 >60 mL/min/1.73m2      Comment:      Effective December 21, 2021 eGFRcr in adults is calculated using the 2021 CKD-EPI creatinine equation which includes age and gender (Hillary et al., NEJM, DOI: 10.1056/HWDSxp0689915)   TSH with free T4 reflex   Result Value Ref Range    TSH 1.84 0.30 - 5.00 uIU/mL       If you have any questions or concerns, please call the clinic at the number listed above.       Sincerely,      Ryann Hodge MD

## 2022-04-29 NOTE — PATIENT INSTRUCTIONS
Use up the donepezil you just picked up and increase that to 2 tablets daily (total ) 10mg daily. And there will be a new prescription at the pharmacy for 10mg daily (one pill daily for future).     Check thyroid level today

## 2022-04-30 PROBLEM — K21.9 GASTROESOPHAGEAL REFLUX DISEASE WITHOUT ESOPHAGITIS: Status: ACTIVE | Noted: 2022-04-30

## 2022-04-30 PROBLEM — F03.91 DEMENTIA WITH BEHAVIORAL DISTURBANCE, UNSPECIFIED DEMENTIA TYPE: Status: ACTIVE | Noted: 2022-04-30

## 2022-04-30 PROBLEM — E87.6 HYPOKALEMIA: Status: ACTIVE | Noted: 2022-04-30

## 2022-04-30 PROBLEM — F05 SUNDOWNING: Status: ACTIVE | Noted: 2022-04-30

## 2022-04-30 NOTE — ASSESSMENT & PLAN NOTE
Appears to be in remission.  Was on Lexapro last patient was seen but I cannot find any documentation as to why this could have been canceled and it was not on medication list which was noticed after today's visit.  Could always reinitiate if there is any relapse or if family does indicate she is still on it and needs refill I am happy to refill

## 2022-04-30 NOTE — ASSESSMENT & PLAN NOTE
We will recheck potassium level to make sure not continuing to be hypokalemic.  Advised potassium rich foods

## 2022-04-30 NOTE — ASSESSMENT & PLAN NOTE
No remarkable findings seen in the hospital.  Negative work-up but will complete work-up outpatient with 30-day Zio patch

## 2022-04-30 NOTE — ASSESSMENT & PLAN NOTE
Increased dose of donepezil 10 mg daily.  Family will watch for any reported GI discomfort and we can always cut back dose if needed

## 2022-05-02 ENCOUNTER — HOSPITAL ENCOUNTER (OUTPATIENT)
Dept: CARDIOLOGY | Facility: HOSPITAL | Age: 87
Discharge: HOME | End: 2022-05-02
Attending: INTERNAL MEDICINE | Admitting: INTERNAL MEDICINE
Payer: COMMERCIAL

## 2022-05-02 DIAGNOSIS — R00.1 SINUS BRADYCARDIA: ICD-10-CM

## 2022-05-02 PROCEDURE — 93270 REMOTE 30 DAY ECG REV/REPORT: CPT

## 2022-05-02 RX ORDER — ISOSORBIDE MONONITRATE 30 MG/1
TABLET, EXTENDED RELEASE ORAL
Qty: 180 TABLET | Refills: 1 | Status: SHIPPED | OUTPATIENT
Start: 2022-05-02 | End: 2023-09-25

## 2022-05-02 NOTE — TELEPHONE ENCOUNTER
"Routing refill request to provider for review/approval because:  Refill too soon.   Blood pressure.     Last Written Prescription Date:  4/29/2022  Last Fill Quantity: 90,  # refills: 1   Last office visit provider:  4/29/2022     Requested Prescriptions   Pending Prescriptions Disp Refills     isosorbide mononitrate (IMDUR) 30 MG 24 hr tablet [Pharmacy Med Name: ISOSORBIDE MONONITRATE 30MG ER TABS] 180 tablet      Sig: TAKE 2 TABLETS(60 MG) BY MOUTH EVERY EVENING       Nitrates Failed - 4/29/2022  3:09 PM        Failed - Blood pressure under 140/90 in past 12 months     BP Readings from Last 3 Encounters:   04/29/22 (!) 145/60   04/17/22 (!) 154/76   07/06/21 110/66                 Passed - Pt is not on erectile dysfunction medications        Passed - Recent (12 mo) or future (30 days) visit within the authorizing provider's specialty     Patient has had an office visit with the authorizing provider or a provider within the authorizing providers department within the previous 12 mos or has a future within next 30 days. See \"Patient Info\" tab in inbasket, or \"Choose Columns\" in Meds & Orders section of the refill encounter.              Passed - Medication is active on med list        Passed - Patient is age 18 or older             Ruby Rodas RN 05/02/22 4:14 PM    "

## 2022-05-19 ENCOUNTER — OFFICE VISIT (OUTPATIENT)
Dept: FAMILY MEDICINE | Facility: CLINIC | Age: 87
End: 2022-05-19

## 2022-05-19 ENCOUNTER — ANCILLARY PROCEDURE (OUTPATIENT)
Dept: GENERAL RADIOLOGY | Facility: CLINIC | Age: 87
End: 2022-05-19
Attending: FAMILY MEDICINE
Payer: COMMERCIAL

## 2022-05-19 VITALS
HEART RATE: 61 BPM | RESPIRATION RATE: 20 BRPM | SYSTOLIC BLOOD PRESSURE: 192 MMHG | WEIGHT: 96.4 LBS | BODY MASS INDEX: 18.21 KG/M2 | DIASTOLIC BLOOD PRESSURE: 73 MMHG

## 2022-05-19 DIAGNOSIS — Z91.81 PERSONAL HISTORY OF FALL: ICD-10-CM

## 2022-05-19 DIAGNOSIS — M54.50 ACUTE RIGHT-SIDED LOW BACK PAIN WITHOUT SCIATICA: ICD-10-CM

## 2022-05-19 DIAGNOSIS — S32.030A CLOSED WEDGE COMPRESSION FRACTURE OF L3 VERTEBRA, INITIAL ENCOUNTER (H): Primary | ICD-10-CM

## 2022-05-19 DIAGNOSIS — M25.551 HIP PAIN, RIGHT: ICD-10-CM

## 2022-05-19 PROCEDURE — 99214 OFFICE O/P EST MOD 30 MIN: CPT | Performed by: FAMILY MEDICINE

## 2022-05-19 PROCEDURE — 72100 X-RAY EXAM L-S SPINE 2/3 VWS: CPT | Mod: TC | Performed by: RADIOLOGY

## 2022-05-19 RX ORDER — HYDROCODONE BITARTRATE AND ACETAMINOPHEN 5; 325 MG/1; MG/1
1 TABLET ORAL EVERY 6 HOURS PRN
Qty: 18 TABLET | Refills: 0 | Status: SHIPPED | OUTPATIENT
Start: 2022-05-19 | End: 2022-05-22

## 2022-05-19 ASSESSMENT — PATIENT HEALTH QUESTIONNAIRE - PHQ9
SUM OF ALL RESPONSES TO PHQ QUESTIONS 1-9: 0
SUM OF ALL RESPONSES TO PHQ QUESTIONS 1-9: 0

## 2022-05-19 ASSESSMENT — ENCOUNTER SYMPTOMS: BACK PAIN: 1

## 2022-05-19 NOTE — PROGRESS NOTES
Assessment & Plan     Closed wedge compression fracture of L3 vertebra, initial encounter (H)  Discussed with patient and her son that typically surgery or bracing is not recommended for compression fractures unless the pain is unable to be controlled.  She has no concerning neurologic findings.  Gave a small supply of Norco for acute pain, discussed use of ibuprofen on a full stomach and Tylenol after completion of Norco.  Recommend follow-up in 1-2 weeks to recheck pain.  - HYDROcodone-acetaminophen (NORCO) 5-325 MG tablet; Take 1 tablet by mouth every 6 hours as needed for pain    Personal history of fall  Acute right-sided low back pain without sciatica  Hip pain, right  XR of the pelvis is negative for pelvic or hip fracture.  She sustained no other injuries related to her fall and did not lose consciousness per her report.  - XR Lumbar Spine 2/3 Views; Future                 Return in about 1 week (around 5/26/2022) for Follow up compression fracture/pain.    Shanthi Danielle MD  [facility]    Jovanni Tobin is a 87 year old who presents for the following health issues  accompanied by her son Osito.    Back Pain     History of Present Illness       Back Pain:  She presents for follow up of back pain. Patient's back pain is a new problem.    Original cause of back pain: a fall  First noticed back pain: in the last week  Patient feels back pain: constantlyLocation of back pain:  Right lower back  Description of back pain: sharp  Back pain spreads: nowhere    Since patient first noticed back pain, pain is: unchanged  Does back pain interfere with her job:  Not applicable  On a scale of 1-10 (10 being the worst), patient describes pain as:  8  What makes back pain worse: standing  Acupuncture: not tried  Acetaminophen: not helpful  Activity or exercise: not tried  Chiropractor:  Not tried  Cold: not tried  Heat: not tried  Massage: not tried  Muscle relaxants: not  helpful  NSAIDS: not helpful  Opioids: not tried  Physical Therapy: not tried  Rest: not helpful  Steroid Injection: not tried  Stretching: not tried  Surgery: not tried  TENS unit: not tried  Topical pain relievers: not tried  Other healthcare providers patient is seeing for back pain: None    She eats 0-1 servings of fruits and vegetables daily.She consumes 2 sweetened beverage(s) daily.She exercises with enough effort to increase her heart rate 9 or less minutes per day.  She exercises with enough effort to increase her heart rate 3 or less days per week.   She is taking medications regularly.    Today's PHQ-9         PHQ-9 Total Score: 0    PHQ-9 Q9 Thoughts of better off dead/self-harm past 2 weeks :   Not at all         Patient presents today with her son Osito for evaluation of right-sided back pain.  The patient is a bit unsure of when she fell, however her son, with whom she actually lives, says that she fell on Saturday, 5 days ago.  Patient says that she was walking into a room and slipped on the floor and fell onto her bottom.  She denies any other injuries including her hands, wrists, arms, ankles or knees.  She denies hitting her head or losing consciousness.  She denies neck pain or headache.  She claims that she felt fine when she got up initially, however the following day when she got up from bed she started to feel pain in her back.  The pain has remained severe since onset and finally her son saw her in pain and convinced her to come in to clinic to be evaluated.  He was in the home with her when she fell and confirms that to his knowledge she did not lose consciousness.  She denies numbness, weakness or tingling in the legs or groin.  She has some pain that seems to radiate into the right hip.    Review of Systems   Musculoskeletal: Positive for back pain.      Constitutional, HEENT, cardiovascular, pulmonary, gi and gu systems are negative, except as otherwise noted.      Objective    BP (!)  192/73 (BP Location: Left arm, Patient Position: Sitting, Cuff Size: Adult Regular)   Pulse 61   Resp 20   Wt 43.7 kg (96 lb 6.4 oz)   BMI 18.21 kg/m    Body mass index is 18.21 kg/m .  Physical Exam   GENERAL: appears uncomfortable secondary to pain, but pleasant and cooperative  EYES: Eyes grossly normal to inspection, PERRL and conjunctivae and sclerae normal  NECK: nontender to palpation of the cervical spine and paraspinous musculature; full pain-free range of motion  RESP: lungs clear to auscultation - no rales, rhonchi or wheezes  CV: regular rate and rhythm, normal S1 S2, no S3 or S4, no murmur, click or rub, no peripheral edema and peripheral pulses strong  MS: tenderness with palpation of the lumbar spine; no bruising or deformity visible; full ROM right hip without worsening of pain  SKIN: no bruising  NEURO: alert and oriented; strength 5/5 lower extremities, sensation intact to light touch throughout  PSYCH: answers questions appropriately, but recent memory appears moderately impaired    Results for orders placed or performed in visit on 05/19/22   XR Pelvis w Hip Right 1 View     Status: None    Narrative    EXAM: XR PELVIS AND HIP RIGHT 1 VIEW  LOCATION: [facility]  DATE/TIME: 5/19/2022 8:26 AM    INDICATION: right pelvic hip pain after a fall from standing height  COMPARISON: None.      Impression    IMPRESSION: No fracture. Mild degenerative changes in both hips. Osteopenia.   Results for orders placed or performed in visit on 05/19/22   XR Lumbar Spine 2/3 Views     Status: None    Narrative    EXAM: XR LUMBAR SPINE 2-3 VIEWS  LOCATION: [facility]  DATE/TIME: 5/19/2022 8:27 AM    INDICATION: Fall on buttocks from standing height, right low back pelvis pain.  COMPARISON: 01/22/2015 CT abdomen pelvis.  TECHNIQUE: CR Lumbar Spine.      Impression    IMPRESSION: Five non-rib-bearing lumbar type vertebrae. Rightward curvature to the lumbar  spine. 3 mm anterolisthesis of L4 on L5 has progressed since previous. 2 mm anterolisthesis of L2 on L3 with minimal anterolisthesis of L3 on L4. Diffuse   osteopenia. Unchanged mild chronic height loss along the superior endplate of L4. Anterior wedging compression deformities of the L1 and L3 vertebral bodies with approximately 20% loss of vertebral body height anteriorly at L1 and approximately 50% loss   of vertebral body height anteriorly at L3. These reflect age-indeterminate L1 and L3 vertebral body compression fractures, favored to be acute if the patient endorses pain on palpation. Diffuse osteopenia. Severe L5-S1 degenerative disc disease with mild   L4-L5 degenerative disc disease. Moderate L4-L5 and L5-S1 facet arthropathy with additional moderate L2-L3 facet arthropathy. Mild facet arthropathy elsewhere. 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 Scattered vascular   calcification. Bilateral sacroiliac joint degenerative change.

## 2022-05-25 ENCOUNTER — TELEPHONE (OUTPATIENT)
Dept: FAMILY MEDICINE | Facility: CLINIC | Age: 87
End: 2022-05-25
Payer: COMMERCIAL

## 2022-05-25 NOTE — TELEPHONE ENCOUNTER
----- Message from Shanthi Danielle MD sent at 5/20/2022  9:21 AM CDT -----  Please call patient or son:  The radiologist confirms the compression fracture that we discussed at the visit, and in addition another smaller compression fracture of another vertebra.  Please have patient follow up if her pain remains uncontrolled, or right away if she develops numbness, weakness or tingling in the legs.  SHAWN Danielle MD

## 2022-05-25 NOTE — TELEPHONE ENCOUNTER
Patient's son returning call.   Notified we need updated FLACO on file. Patient and son present and notified of results and recommendations.   She states her pain is ok. Will call back for appointment if worsening.

## 2022-05-30 PROBLEM — S32.030A: Status: ACTIVE | Noted: 2022-05-30

## 2022-06-02 PROCEDURE — 93272 ECG/REVIEW INTERPRET ONLY: CPT | Performed by: INTERNAL MEDICINE

## 2022-07-07 DIAGNOSIS — I21.4 ACUTE NON-ST ELEVATION MYOCARDIAL INFARCTION (NSTEMI) (H): Primary | ICD-10-CM

## 2022-07-08 NOTE — TELEPHONE ENCOUNTER
"Routing refill request to provider for review/approval because:  Medication is reported/historical    Last Written Prescription Date:    Last Fill Quantity: ,  # refills:    Last office visit provider:  4/29/22     Requested Prescriptions   Pending Prescriptions Disp Refills     atorvastatin (LIPITOR) 80 MG tablet [Pharmacy Med Name: ATORVASTATIN 80MG TABLETS] 90 tablet      Sig: TAKE 1 TABLET(80 MG) BY MOUTH DAILY FOR CHOLESTEROL       Statins Protocol Failed - 7/7/2022 11:24 AM        Failed - LDL on file in past 12 months     Recent Labs   Lab Test 03/28/21  0200   LDL 82             Passed - No abnormal creatine kinase in past 12 months     No lab results found.             Passed - Recent (12 mo) or future (30 days) visit within the authorizing provider's specialty     Patient has had an office visit with the authorizing provider or a provider within the authorizing providers department within the previous 12 mos or has a future within next 30 days. See \"Patient Info\" tab in inbasket, or \"Choose Columns\" in Meds & Orders section of the refill encounter.              Passed - Medication is active on med list        Passed - Patient is age 18 or older        Passed - No active pregnancy on record        Passed - No positive pregnancy test in past 12 months           clopidogrel (PLAVIX) 75 MG tablet [Pharmacy Med Name: CLOPIDOGREL 75MG TABLETS] 90 tablet      Sig: TAKE 1 TABLET(75 MG) BY MOUTH DAILY. START TOMORROW       Plavix Failed - 7/7/2022 11:24 AM        Failed - No active PPI on record unless is Protonix        Passed - Normal HGB on file in past 12 months     Recent Labs   Lab Test 04/15/22  2026   HGB 12.1               Passed - Normal Platelets on file in past 12 months     Recent Labs   Lab Test 04/15/22  2026                  Passed - Recent (12 mo) or future (30 days) visit within the authorizing provider's specialty     Patient has had an office visit with the authorizing provider or a " "provider within the authorizing providers department within the previous 12 mos or has a future within next 30 days. See \"Patient Info\" tab in inbasket, or \"Choose Columns\" in Meds & Orders section of the refill encounter.              Passed - Medication is active on med list        Passed - Patient is age 18 or older        Passed - No active pregnancy on record        Passed - No positive pregnancy test in past 12 months             Xi Malcolm RN 07/08/22 12:58 PM  "

## 2022-07-11 RX ORDER — ATORVASTATIN CALCIUM 80 MG/1
TABLET, FILM COATED ORAL
Qty: 90 TABLET | Refills: 4 | Status: SHIPPED | OUTPATIENT
Start: 2022-07-11 | End: 2023-09-25

## 2022-07-11 RX ORDER — CLOPIDOGREL BISULFATE 75 MG/1
TABLET ORAL
Qty: 90 TABLET | Refills: 0 | Status: SHIPPED | OUTPATIENT
Start: 2022-07-11 | End: 2022-11-14

## 2022-07-25 DIAGNOSIS — I25.10 ATHEROSCLEROSIS OF NATIVE CORONARY ARTERY OF NATIVE HEART WITHOUT ANGINA PECTORIS: Primary | ICD-10-CM

## 2022-07-25 RX ORDER — NITROGLYCERIN 0.4 MG/1
TABLET SUBLINGUAL
Qty: 100 TABLET | Refills: 0 | Status: SHIPPED | OUTPATIENT
Start: 2022-07-25 | End: 2023-09-25

## 2022-07-25 RX ORDER — NITROGLYCERIN 0.4 MG/1
TABLET SUBLINGUAL
Qty: 100 TABLET | OUTPATIENT
Start: 2022-07-25

## 2022-10-04 PROBLEM — F03.918 DEMENTIA WITH BEHAVIORAL DISTURBANCE (H): Status: ACTIVE | Noted: 2022-04-30

## 2023-09-25 ENCOUNTER — TELEPHONE (OUTPATIENT)
Dept: FAMILY MEDICINE | Facility: CLINIC | Age: 88
End: 2023-09-25

## 2023-09-25 ENCOUNTER — OFFICE VISIT (OUTPATIENT)
Dept: FAMILY MEDICINE | Facility: CLINIC | Age: 88
End: 2023-09-25
Payer: COMMERCIAL

## 2023-09-25 VITALS
HEIGHT: 61 IN | DIASTOLIC BLOOD PRESSURE: 63 MMHG | SYSTOLIC BLOOD PRESSURE: 156 MMHG | WEIGHT: 95.2 LBS | HEART RATE: 58 BPM | BODY MASS INDEX: 17.97 KG/M2 | RESPIRATION RATE: 18 BRPM | OXYGEN SATURATION: 97 %

## 2023-09-25 DIAGNOSIS — Z13.9 ENCOUNTER FOR SCREENING INVOLVING SOCIAL DETERMINANTS OF HEALTH (SDOH): Primary | ICD-10-CM

## 2023-09-25 DIAGNOSIS — S32.030A CLOSED WEDGE COMPRESSION FRACTURE OF L3 VERTEBRA, INITIAL ENCOUNTER (H): ICD-10-CM

## 2023-09-25 DIAGNOSIS — Z00.00 ENCOUNTER FOR MEDICARE ANNUAL WELLNESS EXAM: ICD-10-CM

## 2023-09-25 DIAGNOSIS — R46.89 COGNITIVE AND BEHAVIORAL CHANGES: ICD-10-CM

## 2023-09-25 DIAGNOSIS — I21.4 ACUTE NON-ST ELEVATION MYOCARDIAL INFARCTION (NSTEMI) (H): ICD-10-CM

## 2023-09-25 DIAGNOSIS — I73.9 PERIPHERAL VASCULAR DISEASE (H): ICD-10-CM

## 2023-09-25 DIAGNOSIS — F03.918 DEMENTIA WITH BEHAVIORAL DISTURBANCE (H): ICD-10-CM

## 2023-09-25 DIAGNOSIS — F32.5 MAJOR DEPRESSIVE DISORDER, SINGLE EPISODE, IN REMISSION (H): ICD-10-CM

## 2023-09-25 DIAGNOSIS — K21.9 GASTROESOPHAGEAL REFLUX DISEASE WITHOUT ESOPHAGITIS: ICD-10-CM

## 2023-09-25 DIAGNOSIS — I10 ESSENTIAL HYPERTENSION: ICD-10-CM

## 2023-09-25 DIAGNOSIS — I25.10 ATHEROSCLEROSIS OF NATIVE CORONARY ARTERY OF NATIVE HEART WITHOUT ANGINA PECTORIS: ICD-10-CM

## 2023-09-25 DIAGNOSIS — F05 SUNDOWNING: ICD-10-CM

## 2023-09-25 DIAGNOSIS — R41.89 COGNITIVE AND BEHAVIORAL CHANGES: ICD-10-CM

## 2023-09-25 DIAGNOSIS — R79.89 ELEVATED TSH: ICD-10-CM

## 2023-09-25 PROBLEM — R07.9 CHEST PAIN: Status: ACTIVE | Noted: 2021-05-16

## 2023-09-25 PROBLEM — G47.9 SLEEP DIFFICULTIES: Status: ACTIVE | Noted: 2023-09-25

## 2023-09-25 PROBLEM — R45.4 ANGER: Status: ACTIVE | Noted: 2023-09-25

## 2023-09-25 PROBLEM — R00.1 SINUS BRADYCARDIA: Status: ACTIVE | Noted: 2021-05-16

## 2023-09-25 LAB
ALBUMIN SERPL BCG-MCNC: 5 G/DL (ref 3.5–5.2)
ALP SERPL-CCNC: 41 U/L (ref 35–104)
ALT SERPL W P-5'-P-CCNC: 14 U/L (ref 0–50)
ANION GAP SERPL CALCULATED.3IONS-SCNC: 13 MMOL/L (ref 7–15)
AST SERPL W P-5'-P-CCNC: 28 U/L (ref 0–45)
BASOPHILS # BLD AUTO: 0 10E3/UL (ref 0–0.2)
BASOPHILS NFR BLD AUTO: 1 %
BILIRUB SERPL-MCNC: 0.4 MG/DL
BUN SERPL-MCNC: 16.3 MG/DL (ref 8–23)
CALCIUM SERPL-MCNC: 10.1 MG/DL (ref 8.8–10.2)
CHLORIDE SERPL-SCNC: 103 MMOL/L (ref 98–107)
CHOLEST SERPL-MCNC: 174 MG/DL
CREAT SERPL-MCNC: 0.8 MG/DL (ref 0.51–0.95)
DEPRECATED HCO3 PLAS-SCNC: 26 MMOL/L (ref 22–29)
EGFRCR SERPLBLD CKD-EPI 2021: 71 ML/MIN/1.73M2
EOSINOPHIL # BLD AUTO: 0 10E3/UL (ref 0–0.7)
EOSINOPHIL NFR BLD AUTO: 0 %
ERYTHROCYTE [DISTWIDTH] IN BLOOD BY AUTOMATED COUNT: 21.8 % (ref 10–15)
GLUCOSE SERPL-MCNC: 82 MG/DL (ref 70–99)
HCT VFR BLD AUTO: 38.9 % (ref 35–47)
HDLC SERPL-MCNC: 71 MG/DL
HGB BLD-MCNC: 13 G/DL (ref 11.7–15.7)
IMM GRANULOCYTES # BLD: 0 10E3/UL
IMM GRANULOCYTES NFR BLD: 0 %
LDLC SERPL CALC-MCNC: 66 MG/DL
LYMPHOCYTES # BLD AUTO: 1.6 10E3/UL (ref 0.8–5.3)
LYMPHOCYTES NFR BLD AUTO: 31 %
MCH RBC QN AUTO: 31.7 PG (ref 26.5–33)
MCHC RBC AUTO-ENTMCNC: 33.4 G/DL (ref 31.5–36.5)
MCV RBC AUTO: 95 FL (ref 78–100)
MONOCYTES # BLD AUTO: 0.7 10E3/UL (ref 0–1.3)
MONOCYTES NFR BLD AUTO: 13 %
NEUTROPHILS # BLD AUTO: 2.9 10E3/UL (ref 1.6–8.3)
NEUTROPHILS NFR BLD AUTO: 54 %
NONHDLC SERPL-MCNC: 103 MG/DL
PLATELET # BLD AUTO: 188 10E3/UL (ref 150–450)
POTASSIUM SERPL-SCNC: 4.6 MMOL/L (ref 3.4–5.3)
PROT SERPL-MCNC: 7.5 G/DL (ref 6.4–8.3)
RBC # BLD AUTO: 4.1 10E6/UL (ref 3.8–5.2)
SODIUM SERPL-SCNC: 142 MMOL/L (ref 136–145)
TRIGL SERPL-MCNC: 183 MG/DL
TSH SERPL DL<=0.005 MIU/L-ACNC: 2.61 UIU/ML (ref 0.3–4.2)
WBC # BLD AUTO: 5.3 10E3/UL (ref 4–11)

## 2023-09-25 PROCEDURE — 99214 OFFICE O/P EST MOD 30 MIN: CPT | Mod: 25 | Performed by: FAMILY MEDICINE

## 2023-09-25 PROCEDURE — G0008 ADMIN INFLUENZA VIRUS VAC: HCPCS | Performed by: FAMILY MEDICINE

## 2023-09-25 PROCEDURE — 36415 COLL VENOUS BLD VENIPUNCTURE: CPT | Performed by: FAMILY MEDICINE

## 2023-09-25 PROCEDURE — 90662 IIV NO PRSV INCREASED AG IM: CPT | Performed by: FAMILY MEDICINE

## 2023-09-25 PROCEDURE — 84443 ASSAY THYROID STIM HORMONE: CPT | Performed by: FAMILY MEDICINE

## 2023-09-25 PROCEDURE — 80061 LIPID PANEL: CPT | Performed by: FAMILY MEDICINE

## 2023-09-25 PROCEDURE — 80053 COMPREHEN METABOLIC PANEL: CPT | Performed by: FAMILY MEDICINE

## 2023-09-25 PROCEDURE — 85025 COMPLETE CBC W/AUTO DIFF WBC: CPT | Performed by: FAMILY MEDICINE

## 2023-09-25 PROCEDURE — G0439 PPPS, SUBSEQ VISIT: HCPCS | Performed by: FAMILY MEDICINE

## 2023-09-25 RX ORDER — CLOPIDOGREL BISULFATE 75 MG/1
75 TABLET ORAL DAILY
Qty: 90 TABLET | Refills: 3 | Status: SHIPPED | OUTPATIENT
Start: 2023-09-25

## 2023-09-25 RX ORDER — QUETIAPINE FUMARATE 25 MG/1
TABLET, FILM COATED ORAL
Qty: 90 TABLET | Refills: 3 | Status: SHIPPED | OUTPATIENT
Start: 2023-09-25

## 2023-09-25 RX ORDER — NITROGLYCERIN 0.4 MG/1
TABLET SUBLINGUAL
Qty: 100 TABLET | Refills: 0 | Status: SHIPPED | OUTPATIENT
Start: 2023-09-25

## 2023-09-25 RX ORDER — ISOSORBIDE MONONITRATE 30 MG/1
TABLET, EXTENDED RELEASE ORAL
Qty: 180 TABLET | Refills: 3 | Status: SHIPPED | OUTPATIENT
Start: 2023-09-25

## 2023-09-25 RX ORDER — DONEPEZIL HYDROCHLORIDE 10 MG/1
10 TABLET, FILM COATED ORAL AT BEDTIME
Qty: 90 TABLET | Refills: 3 | Status: SHIPPED | OUTPATIENT
Start: 2023-09-25

## 2023-09-25 RX ORDER — ATORVASTATIN CALCIUM 80 MG/1
TABLET, FILM COATED ORAL
Qty: 90 TABLET | Refills: 3 | Status: SHIPPED | OUTPATIENT
Start: 2023-09-25

## 2023-09-25 ASSESSMENT — ENCOUNTER SYMPTOMS
MYALGIAS: 0
SHORTNESS OF BREATH: 1
NAUSEA: 0
ARTHRALGIAS: 0
PARESTHESIAS: 0
PALPITATIONS: 1
HEADACHES: 0
JOINT SWELLING: 0
HEMATOCHEZIA: 0
SORE THROAT: 0
HEMATURIA: 0
FEVER: 0
DIZZINESS: 0
DIARRHEA: 0
COUGH: 0
ABDOMINAL PAIN: 0
NERVOUS/ANXIOUS: 1
BREAST MASS: 0
EYE PAIN: 0
CHILLS: 0
FREQUENCY: 0
HEARTBURN: 0
WEAKNESS: 1
DYSURIA: 0
CONSTIPATION: 0

## 2023-09-25 ASSESSMENT — PATIENT HEALTH QUESTIONNAIRE - PHQ9
10. IF YOU CHECKED OFF ANY PROBLEMS, HOW DIFFICULT HAVE THESE PROBLEMS MADE IT FOR YOU TO DO YOUR WORK, TAKE CARE OF THINGS AT HOME, OR GET ALONG WITH OTHER PEOPLE: SOMEWHAT DIFFICULT
SUM OF ALL RESPONSES TO PHQ QUESTIONS 1-9: 6
SUM OF ALL RESPONSES TO PHQ QUESTIONS 1-9: 6

## 2023-09-25 ASSESSMENT — ACTIVITIES OF DAILY LIVING (ADL)
CURRENT_FUNCTION: TRANSPORTATION REQUIRES ASSISTANCE
CURRENT_FUNCTION: SHOPPING REQUIRES ASSISTANCE
CURRENT_FUNCTION: MEDICATION ADMINISTRATION REQUIRES ASSISTANCE
CURRENT_FUNCTION: TELEPHONE REQUIRES ASSISTANCE

## 2023-09-25 NOTE — TELEPHONE ENCOUNTER
Pt filled out handicap parking certificate while here for a physical and would like it mailed to home address on file when it has been completed.     Placed in Dr. LARA's inbox on desk.

## 2023-09-25 NOTE — COMMUNITY RESOURCES LIST (ENGLISH)
09/25/2023   [facility]  N/A  For questions about this resource list or additional care needs, please contact your primary care clinic or care manager.  Phone: [phone]   Email: N/A   Address: [address] [place] [id number]   Hours: N/A        Financial Stability       Utility payment assistance  1  [facility] Distance: 5.58 miles      Phone/Virtual   [address] [place] [address]  Language: English  Hours: Mon - Fri 9:00 AM - 4:00 PM , Sun 9:30 AM - 12:00 PM  Fees: Free   Phone: (400) 644-5094 Email: [email or web address] Website: [email or web address]     2  [employer]) [facility] Supportive Housing Assistance Program (SHAP) Distance: 9.46 miles      Phone/Virtual   [address] [place] [address]  Language: English, Hmong, [place]  Hours: Mon - Fri 8:30 AM - 5:00 PM  Fees: Free   Phone: (527) 588-7025 Email: efren@[employer].org Website: http://www.[employer].org/hap-impact-areas/          Important Numbers & Websites       Emergency Services   911  City Services   311  Poison Control   (225) 253-9405  Suicide Prevention Lifeline   (275) 295-2700 (TALK)  Child Abuse Hotline   (254) 102-9863 (4-A-Child)  Sexual Assault Hotline   (744) 772-3171 (HOPE)  National Runaway Safeline   (754) 776-7049 (RUNAWAY)  All-Options Talkline   (675) 759-5586  Substance Abuse Referral   (732) 155-3821 (HELP)

## 2023-09-25 NOTE — PROGRESS NOTES
"The patient was provided with suggestions to help her develop a healthy physical lifestyle.  She is at risk for lack of exercise and has been provided with information to increase physical activity for the benefit of her well-being.  The patient was counseled and encouraged to consider modifying their diet and eating habits. She was provided with information on recommended healthy diet options.  The patient reports that she has difficulty with activities of daily living. I have asked that the patient make a follow up appointment if she desires further work-up, where this issue will be further evaluated and addressed.  The patient was provided with suggestions to help her develop a healthy emotional lifestyle.  The patient's PHQ-9 score is consistent with mild depression. She was provided with information regarding depression and was advised to schedule a follow up if she desires to go back on medication Lexapro.  Daughter will discuss this with family members to further address this issue.  She is at risk for falling and has been provided with information to reduce the risk of falling at home.Answers submitted by the patient for this visit:  Patient Health Questionnaire (Submitted on 9/25/2023)  If you checked off any problems, how difficult have these problems made it for you to do your work, take care of things at home, or get along with other people?: Somewhat difficult  PHQ9 TOTAL SCORE: 6  Annual Preventive Visit (Submitted on 9/25/2023)  Chief Complaint: Annual Exam:  In general, how would you rate your overall physical health?: fair  Frequency of exercise:: None  Do you usually eat at least 4 servings of fruit and vegetables a day, include whole grains & fiber, and avoid regularly eating high fat or \"junk\" foods? : No  Taking medications regularly:: Yes  Medication side effects:: None  Activities of Daily Living: telephone requires assistance, transportation requires assistance, shopping requires assistance, " medication administration requires assistance  Home safety: no safety concerns identified  Hearing Impairment:: no hearing concerns  In the past 6 months, have you been bothered by leaking of urine?: No  abdominal pain: No  Blood in stool: No  Blood in urine: No  chest pain: Yes  chills: No  congestion: No  constipation: No  cough: No  diarrhea: No  dizziness: No  ear pain: No  eye pain: No  nervous/anxious: Yes  fever: No  frequency: No  genital sores: No  headaches: No  hearing loss: No  heartburn: No  arthralgias: No  joint swelling: No  peripheral edema: No  mood changes: Yes  myalgias: No  nausea: No  dysuria: No  palpitations: Yes  Skin sensation changes: No  sore throat: No  urgency: No  rash: No  shortness of breath: Yes  visual disturbance: No  weakness: Yes  pelvic pain: No  vaginal bleeding: No  vaginal discharge: No  tenderness: No  breast mass: No  breast discharge: No  In general, how would you rate your overall mental or emotional health?: fair  Additional concerns today:: No

## 2023-09-25 NOTE — PROGRESS NOTES
ASSESSMENT / PLAN:       ICD-10-CM    1. Encounter for screening involving social determinants of health (SDoH)  Z13.9       2. Acute non-ST elevation myocardial infarction (NSTEMI) (H)  I21.4 atorvastatin (LIPITOR) 80 MG tablet     clopidogrel (PLAVIX) 75 MG tablet      3. Essential hypertension  I10 isosorbide mononitrate (IMDUR) 30 MG 24 hr tablet     Comprehensive metabolic panel (BMP + Alb, Alk Phos, ALT, AST, Total. Bili, TP)     Comprehensive metabolic panel (BMP + Alb, Alk Phos, ALT, AST, Total. Bili, TP)      4. Atherosclerosis of native coronary artery of native heart without angina pectoris  I25.10 nitroGLYcerin (NITROSTAT) 0.4 MG sublingual tablet     Lipid panel     Lipid panel      5. Gastroesophageal reflux disease without esophagitis  K21.9 omeprazole (PRILOSEC) 20 MG DR capsule      6. Sundowning  F05 QUEtiapine (SEROQUEL) 25 MG tablet      7. Cognitive and behavioral changes  R41.89 donepezil (ARICEPT) 10 MG tablet    R46.89 CBC with platelets and differential     CBC with platelets and differential      8. Encounter for Medicare annual wellness exam  Z00.00       9. Elevated TSH  R79.89 TSH with free T4 reflex     TSH with free T4 reflex      10. Dementia with behavioral disturbance (H)  F03.918       11. Major depressive disorder, single episode, in remission (H)  F32.5       12. Peripheral vascular disease (H)  I73.9         Patient here with multiple comorbidities presents for annual exam and med refills  .  Following issues addressed  1: Depression and anxiety: Used to be on Lexapro that she had discontinued.  Discussed about restarting.  Accompanying daughter will discuss with family members and let me know.  2: Atherosclerotic diseases.:  On aspirin and Plavix.  Does not follow with cardiology and defers  3: Compression fracture: Discussed osteoporosis and further management.  She defers.  4: Cognitive issues: Now lives with son-in-law and family.  They are providing cares.  She has not  "undergone any neuropsych testing.  She does take medication donezepil.  5: Peripheral vascular disease: Continues to smoke.  No acute issues  6: Smoking: No plans to discontinue.  7: Hypertension: Blood pressure in fair control.  According to accompanying daughter, may be better at home.  Does not like coming to the clinic.  8: Noted elevated TSH during her hospital visit.  I will check it again.      Overall the summary for this patient-patient with his current smoking, atherosclerotic cardiovascular disease with peripheral vascular disease, weight decline over the years, advancing dementia without behavioral disturbances is not interested in further work-up and does not like going to the clinic.  Family will update me regarding her medication list so that this can be accurately managed with current list on epic.  They will let me know if they are interested in restarting Lexapro for her anxiety.        COUNSELING:  Reviewed preventive health counseling, as reflected in patient instructions       Regular exercise       Healthy diet/nutrition       Vision screening       Fall risk prevention       Advanced Planning         She reports that she has been smoking cigarettes. She has never used smokeless tobacco.  Nicotine/Tobacco Cessation Plan:     SUBJECTIVE:   Crista is a 88 year old who presents for Preventive Visit.      9/25/2023     8:52 AM   Additional Questions   Roomed by Maria G Rose CMA   Accompanied by Daughter in law- Soni       Are you in the first 12 months of your Medicare coverage?  No    Healthy Habits:     In general, how would you rate your overall health?  Fair    Frequency of exercise:  None    Duration of exercise:  Other    Do you usually eat at least 4 servings of fruit and vegetables a day, include whole grains    & fiber and avoid regularly eating high fat or \"junk\" foods?  No    Taking medications regularly:  Yes    Barriers to taking medications:  None    Medication side effects:  " None    Ability to successfully perform activities of daily living:  Telephone requires assistance, transportation requires assistance, shopping requires assistance and medication administration requires assistance    Home Safety:  No safety concerns identified    Hearing Impairment:  No hearing concerns    In the past 6 months, have you been bothered by leaking of urine?  No    In general, how would you rate your overall mental or emotional health?  Fair    Additional concerns today:  No  Answers submitted by the patient for this visit:  Patient Health Questionnaire (Submitted on 9/25/2023)  If you checked off any problems, how difficult have these problems made it for you to do your work, take care of things at home, or get along with other people?: Somewhat difficult  PHQ9 TOTAL SCORE: 6      Today's PHQ-9 Score:       9/25/2023     8:19 AM   PHQ-9 SCORE   PHQ-9 Total Score MyChart 6 (Mild depression)   PHQ-9 Total Score 6           Have you ever done Advance Care Planning? (For example, a Health Directive, POLST, or a discussion with a medical provider or your loved ones about your wishes): No, advance care planning information given to patient to review.  Patient plans to discuss their wishes with loved ones or provider.         Fall risk  Fallen 2 or more times in the past year?: Yes  Any fall with injury in the past year?: Yes  click delete button to remove this line now  Cognitive Screening   1) Repeat 3 items (Leader, Season, Table)    2) Clock draw: ABNORMAL Numbers and arms wrong  3) 3 item recall: Recalls NO objects   Results: 0 items recalled: PROBABLE COGNITIVE IMPAIRMENT, **INFORM PROVIDER**    Mini-CogTM Alexis Erickson. Licensed by the author for use in [facility]; reprinted with permission ([email or web address]). All rights reserved.      Do you have sleep apnea, excessive snoring or daytime drowsiness? : no    Reviewed and updated as needed this visit by clinical staff   Tobacco   Allergies  Meds              Reviewed and updated as needed this visit by Provider                 Social History     Tobacco Use     Smoking status: Every Day     Types: Cigarettes     Last attempt to quit: 2007     Years since quittin.6     Smokeless tobacco: Never   Substance Use Topics     Alcohol use: Yes     Comment: Alcoholic Drinks/day: occasional beer or bloody randall             2023     8:26 AM   Alcohol Use   Prescreen: >3 drinks/day or >7 drinks/week? No          No data to display              Do you have a current opioid prescription? No  Do you use any other controlled substances or medications that are not prescribed by a provider? None    Current providers sharing in care for this patient include:   Patient Care Team:  Ryann Hodge MD as PCP - General (Family Medicine)  Ryann Hodge MD as Assigned PCP    The following health maintenance items are reviewed in Epic and correct as of today:  Health Maintenance   Topic Date Due     DEPRESSION ACTION PLAN  Never done     MEDICARE ANNUAL WELLNESS VISIT  Never done     ZOSTER IMMUNIZATION (2 of 2) 2018     COVID-19 Vaccine (4 - Pfizer series) 2022     DTAP/TDAP/TD IMMUNIZATION (1 - Tdap) 2022     ADVANCE CARE PLANNING  2023     ANNUAL REVIEW OF HM ORDERS  2023     INFLUENZA VACCINE (1) 2023     PHQ-9  2024     FALL RISK ASSESSMENT  2024     Pneumococcal Vaccine: 65+ Years  Completed     IPV IMMUNIZATION  Aged Out     HPV IMMUNIZATION  Aged Out     MENINGITIS IMMUNIZATION  Aged Out     BP Readings from Last 3 Encounters:   23 (!) 156/63   22 (!) 192/73   22 (!) 145/60    Wt Readings from Last 3 Encounters:   23 43.2 kg (95 lb 3.2 oz)   22 43.7 kg (96 lb 6.4 oz)   22 43.6 kg (96 lb 3.2 oz)                  Patient Active Problem List   Diagnosis     Essential hypertension     Major depressive disorder, single episode, in remission (H)      Coronary atherosclerosis     Pure hypercholesterolemia     Peripheral vascular disease (H)     Heart murmur     History of myocardial infarction     Syncope, unspecified syncope type     Dementia with behavioral disturbance, unspecified dementia type     Hypokalemia     Gastroesophageal reflux disease without esophagitis     Sundowning     Closed wedge compression fracture of L3 vertebra, initial encounter (H)     Anger     Chest pain     Cognitive and behavioral changes     Dyslipidemia, goal LDL below 70     Moderate recurrent major depression (H)     Sinus bradycardia     Sleep difficulties     Past Surgical History:   Procedure Laterality Date     APPENDECTOMY       BACK SURGERY       CORONARY STENT PLACEMENT      2      CV CORONARY ANGIOGRAM N/A 3/28/2021    Procedure: Coronary Angiogram;  Surgeon: Narinder Ryan MD;  Location: [facility] Cardiac Cath Lab;  Service: Cardiology     CV LEFT HEART CATHETERIZATION WITH LEFT VENTRICULOGRAM N/A 3/28/2021    Procedure: Left Heart Catheterization with Left Ventriculogram;  Surgeon: Narinder Ryan MD;  Location: [facility] Cardiac Cath Lab;  Service: Cardiology     femoral stents       IR ABDOMINAL AORTOGRAM  2011     IR MISCELLANEOUS PROCEDURE  2011     OOPHORECTOMY       STENT,CORONARY, S660 2007     VASCULAR SURGERY      stents legs        Social History     Tobacco Use     Smoking status: Every Day     Types: Cigarettes     Last attempt to quit: 2007     Years since quittin.6     Smokeless tobacco: Never   Substance Use Topics     Alcohol use: Yes     Comment: Alcoholic Drinks/day: occasional beer or bloody randall     Family History   Problem Relation Age of Onset     Coronary Artery Disease Father      Prostate Cancer Father      Diabetes No family hx of      Hypertension No family hx of      Breast Cancer No family hx of      Colon Cancer No family hx of      Other Cancer No family hx of      Kidney Disease Mother      Heart  Disease Father      Heart Disease Brother      Hypertension Brother          Current Outpatient Medications   Medication Sig Dispense Refill     atorvastatin (LIPITOR) 80 MG tablet TAKE 1 TABLET(80 MG) BY MOUTH DAILY FOR CHOLESTEROL 90 tablet 3     clopidogrel (PLAVIX) 75 MG tablet Take 1 tablet (75 mg) by mouth daily 90 tablet 3     donepezil (ARICEPT) 10 MG tablet Take 1 tablet (10 mg) by mouth At Bedtime Dose increase 90 tablet 3     isosorbide mononitrate (IMDUR) 30 MG 24 hr tablet TAKE 2 TABLETS(60 MG) BY MOUTH EVERY EVENING 180 tablet 3     nitroGLYcerin (NITROSTAT) 0.4 MG sublingual tablet PLACE ONE TABLET UNDER TONGUE AS NEEDED FOR CHEST PAIN EVERY 5 MINUTES UP TO 3 TIMES AND IF NO RELIEF CALL 911 100 tablet 0     omeprazole (PRILOSEC) 20 MG DR capsule TAKE 1 CAPSULE(20 MG) BY MOUTH DAILY BEFORE BREAKFAST 90 capsule 3     QUEtiapine (SEROQUEL) 25 MG tablet TAKE 1/2 TO 1 TABLET EVERY EVENING AROUND DINNER 90 tablet 3     aspirin 81 MG EC tablet Take 81 mg by mouth daily             Pertinent mammograms are reviewed under the imaging tab.    Review of Systems   Constitutional:  Negative for chills and fever.   HENT:  Negative for congestion, ear pain, hearing loss and sore throat.    Eyes:  Negative for pain and visual disturbance.   Respiratory:  Positive for shortness of breath. Negative for cough.    Cardiovascular:  Positive for chest pain and palpitations. Negative for peripheral edema.   Gastrointestinal:  Negative for abdominal pain, constipation, diarrhea, heartburn, hematochezia and nausea.   Breasts:  Negative for tenderness, breast mass and discharge.   Genitourinary:  Negative for dysuria, frequency, genital sores, hematuria, pelvic pain, urgency, vaginal bleeding and vaginal discharge.   Musculoskeletal:  Negative for arthralgias, joint swelling and myalgias.   Skin:  Negative for rash.   Neurological:  Positive for weakness. Negative for dizziness, headaches and paresthesias.  "  Psychiatric/Behavioral:  Positive for mood changes. The patient is nervous/anxious.          OBJECTIVE:   BP (!) 181/64 (BP Location: Left arm, Patient Position: Sitting, Cuff Size: Adult Regular)   Pulse 58   Resp 18   Ht 1.549 m (5' 1\")   Wt 43.2 kg (95 lb 3.2 oz)   SpO2 97%   BMI 17.99 kg/m   Estimated body mass index is 17.99 kg/m  as calculated from the following:    Height as of this encounter: 1.549 m (5' 1\").    Weight as of this encounter: 43.2 kg (95 lb 3.2 oz).  Physical Exam  GENERAL: alert, no distress, and elderly  NECK: no adenopathy, no asymmetry, masses, or scars and thyroid normal to palpation  RESP: lungs clear to auscultation - no rales, rhonchi or wheezes  CV: regular rate and rhythm, normal S1 S2, no S3 or S4, no murmur, click or rub, no peripheral edema and peripheral pulses strong  ABDOMEN: soft, nontender, no hepatosplenomegaly, no masses and bowel sounds normal  MS: no gross musculoskeletal defects noted, no edema    Diagnostic Test Results:  Labs reviewed in Epic  Results for orders placed or performed in visit on 09/25/23 (from the past 24 hour(s))   CBC with platelets and differential    Narrative    The following orders were created for panel order CBC with platelets and differential.  Procedure                               Abnormality         Status                     ---------                               -----------         ------                     CBC with platelets and d...[[id number]]                      In process                   Please view results for these tests on the individual orders.       Information offered: Patient not interested at this time      Appropriate preventive services were discussed with this patient, including applicable screening as appropriate for cardiovascular disease, diabetes, osteopenia/osteoporosis, and glaucoma.  As appropriate for age/gender, discussed screening for colorectal cancer, prostate cancer, breast cancer, and cervical " cancer. Checklist reviewing preventive services available has been given to the patient.    Reviewed patients plan of care and provided an AVS. The Basic Care Plan (routine screening as documented in Health Maintenance) for Crista meets the Care Plan requirement. This Care Plan has been established and reviewed with the Patient and daughter.          Alva Zimmer MD  [facility]    Identified Health Risks:  I have reviewed Opioid Use Disorder and Substance Use Disorder risk factors and made any needed referrals.

## 2023-09-25 NOTE — LETTER
September 27, 2023      Crista JANE Kimani  [address]  [place] [facility] [address]        Dear ,    We are writing to inform you of your test results.    Labs are stable.  Continue current medications.     Resulted Orders   Lipid panel   Result Value Ref Range    Cholesterol 174 <200 mg/dL    Triglycerides 183 (H) <150 mg/dL    Direct Measure HDL 71 >=50 mg/dL    LDL Cholesterol Calculated 66 <=100 mg/dL    Non HDL Cholesterol 103 <130 mg/dL    Narrative    Cholesterol  Desirable:  <200 mg/dL    Triglycerides  Normal:  Less than 150 mg/dL  Borderline High:  150-199 mg/dL  High:  200-499 mg/dL  Very High:  Greater than or equal to 500 mg/dL    Direct Measure HDL  Female:  Greater than or equal to 50 mg/dL   Male:  Greater than or equal to 40 mg/dL    LDL Cholesterol  Desirable:  <100mg/dL  Above Desirable:  100-129 mg/dL   Borderline High:  130-159 mg/dL   High:  160-189 mg/dL   Very High:  >= 190 mg/dL    Non HDL Cholesterol  Desirable:  130 mg/dL  Above Desirable:  130-159 mg/dL  Borderline High:  160-189 mg/dL  High:  190-219 mg/dL  Very High:  Greater than or equal to 220 mg/dL   Comprehensive metabolic panel (BMP + Alb, Alk Phos, ALT, AST, Total. Bili, TP)   Result Value Ref Range    Sodium 142 136 - 145 mmol/L    Potassium 4.6 3.4 - 5.3 mmol/L    Carbon Dioxide (CO2) 26 22 - 29 mmol/L    Anion Gap 13 7 - 15 mmol/L    Urea Nitrogen 16.3 8.0 - 23.0 mg/dL    Creatinine 0.80 0.51 - 0.95 mg/dL    GFR Estimate 71 >60 mL/min/1.73m2    Calcium 10.1 8.8 - 10.2 mg/dL    Chloride 103 98 - 107 mmol/L    Glucose 82 70 - 99 mg/dL    Alkaline Phosphatase 41 35 - 104 U/L    AST 28 0 - 45 U/L      Comment:      Reference intervals for this test were updated on 6/12/2023 to more accurately reflect our healthy population. There may be differences in the flagging of prior results with similar values performed with this method. Interpretation of those prior results can be made in the context of the updated reference  intervals.    ALT 14 0 - 50 U/L      Comment:      Reference intervals for this test were updated on 6/12/2023 to more accurately reflect our healthy population. There may be differences in the flagging of prior results with similar values performed with this method. Interpretation of those prior results can be made in the context of the updated reference intervals.      Protein Total 7.5 6.4 - 8.3 g/dL    Albumin 5.0 3.5 - 5.2 g/dL    Bilirubin Total 0.4 <=1.2 mg/dL   TSH with free T4 reflex   Result Value Ref Range    TSH 2.61 0.30 - 4.20 uIU/mL   CBC with platelets and differential   Result Value Ref Range    WBC Count 5.3 4.0 - 11.0 10e3/uL    RBC Count 4.10 3.80 - 5.20 10e6/uL    Hemoglobin 13.0 11.7 - 15.7 g/dL    Hematocrit 38.9 35.0 - 47.0 %    MCV 95 78 - 100 fL    MCH 31.7 26.5 - 33.0 pg    MCHC 33.4 31.5 - 36.5 g/dL    RDW 21.8 (H) 10.0 - 15.0 %    Platelet Count 188 150 - 450 10e3/uL    % Neutrophils 54 %    % Lymphocytes 31 %    % Monocytes 13 %    % Eosinophils 0 %    % Basophils 1 %    % Immature Granulocytes 0 %    Absolute Neutrophils 2.9 1.6 - 8.3 10e3/uL    Absolute Lymphocytes 1.6 0.8 - 5.3 10e3/uL    Absolute Monocytes 0.7 0.0 - 1.3 10e3/uL    Absolute Eosinophils 0.0 0.0 - 0.7 10e3/uL    Absolute Basophils 0.0 0.0 - 0.2 10e3/uL    Absolute Immature Granulocytes 0.0 <=0.4 10e3/uL       If you have any questions or concerns, please call the clinic at the number listed above.       Sincerely,      Alva Zimmer MD

## 2023-09-25 NOTE — COMMUNITY RESOURCES LIST (ENGLISH)
09/25/2023   [facility] Out[facility]  N/A  For additional resource needs, please contact your health insurance member services or your primary care team.  Phone: [phone]   Email: N/A   Address: [facility] [place] [address]   Hours: N/A        Financial Stability       Utility payment assistance  1  [place] [employer] - [place]'s Telephone Assistance Plan (TAP) and Federal Lifeline and Affordable Connectivity Program (ACP) Distance: 10.28 miles      Phone/Virtual   [address] [place] [address]  Language: English  Fees: Free   Phone: (546) 218-5540 Email: consumer.puc@[facility].mn. Website: https://[place].gov/puc/consumers/telephone/     2  [place] [employer] - Energy and Utilities Distance: 11.31 miles      In-Person, Phone/Virtual   [address] [place] [address]  Language: English  Hours: Mon - Fri 8:30 AM - 4:30 PM  Fees: Free   Phone: (249) 453-6151 Website: https://[place]/[employer]/energy/consumer-assistance/energy-assistance-program/          Important Numbers & Websites       [facility] [place]   [address] 211itedway.org  Poison Control   (511) 992-2410 [place]poison.[place]  Suicide and Crisis Lifeline   [address]  Childhelp [place] Child Abuse Hotline   246.814.2261 Childhelphotline.org  National Sexual Assault Hotline   (922) 392-2291 (HOPE) Rainn.org  National Runaway Safeline   (658) 199-5851 (RUNAWAY) 1800runaway.org  Pregnancy & Postpartum Support [place]   Call/text 069-452-1374 Ppsupport[place].org  Substance Abuse National Helpline ([facility]   592-684-HELP (6747) Findtreatment.gov  Emergency Services   911

## 2023-09-25 NOTE — PATIENT INSTRUCTIONS
Patient Education   Personalized Prevention Plan  You are due for the preventive services outlined below.  Your care team is available to assist you in scheduling these services.  If you have already completed any of these items, please share that information with your care team to update in your medical record.  Health Maintenance Due   Topic Date Due     Depression Action Plan  Never done     Zoster (Shingles) Vaccine (2 of 2) 09/11/2018     COVID-19 Vaccine (4 - Pfizer series) 01/13/2022     Diptheria Tetanus Pertussis (DTAP/TDAP/TD) Vaccine (1 - Tdap) 04/30/2022     ANNUAL REVIEW OF HM ORDERS  04/29/2023     Flu Vaccine (1) 09/01/2023     Your Health Risk Assessment indicates you feel you are not in good health    A healthy lifestyle helps keep the body fit and the mind alert. It helps protect you from disease, helps you fight disease, and helps prevent chronic disease (disease that doesn't go away) from getting worse. This is important as you get older and begin to notice twinges in muscles and joints and a decline in the strength and stamina you once took for granted. A healthy lifestyle includes good healthcare, good nutrition, weight control, recreation, and regular exercise. Avoid harmful substances and do what you can to keep safe. Another part of a healthy lifestyle is stay mentally active and socially involved.    Good healthcare   Have a wellness visit every year.   If you have new symptoms, let us know right away. Don't wait until the next checkup.   Take medicines exactly as prescribed and keep your medicines in a safe place. Tell us if your medicine causes problems.   Healthy diet and weight control   Eat 3 or 4 small, nutritious, low-fat, high-fiber meals a day. Include a variety of fruits, vegetables, and whole-grain foods.   Make sure you get enough calcium in your diet. Calcium, vitamin D, and exercise help prevent osteoporosis (bone thinning).   If you live alone, try eating with others when you  "can. That way you get a good meal and have company while you eat it.   Try to keep a healthy weight. If you eat more calories than your body uses for energy, it will be stored as fat and you will gain weight.     Recreation   Recreation is not limited to sports and team events. It includes any activity that provides relaxation, interest, enjoyment, and exercise. Recreation provides an outlet for physical, mental, and social energy. It can give a sense of worth and achievement. It can help you stay healthy.    Mental Exercise and Social Involvement  Mental and emotional health is as important as physical health. Keep in touch with friends and family. Stay as active as possible. Continue to learn and challenge yourself.   Things you can do to stay mentally active are:  Learn something new, like a foreign language or musical instrument.   Play SCRABBLE or do crossword puzzles. If you cannot find people to play these games with you at home, you can play them with others on your computer through the Internet.   Join a games club--anything from card games to chess or checkers or lawn bowling.   Start a new hobby.   Go back to school.   Volunteer.   Read.   Keep up with world events.  Learning About Being Physically Active  What is physical activity?     Being physically active means doing any kind of activity that gets your body moving.  The types of physical activity that can help you get fit and stay healthy include:  Aerobic or \"cardio\" activities. These make your heart beat faster and make you breathe harder, such as brisk walking, riding a bike, or running. They strengthen your heart and lungs and build up your endurance.  Strength training activities. These make your muscles work against, or \"resist,\" something. Examples include lifting weights or doing push-ups. These activities help tone and strengthen your muscles and bones.  Stretches. These let you move your joints and muscles through their full range of " motion. Stretching helps you be more flexible.  Reaching a balance between these three types of physical activity is important because each one contributes to your overall fitness.  What are the benefits of being active?  Being active is one of the best things you can do for your health. It helps you to:  Feel stronger and have more energy to do all the things you like to do.  Focus better at school or work.  Feel, think, and sleep better.  Reach and stay at a healthy weight.  Lose fat and build lean muscle.  Lower your risk for serious health problems, including diabetes, heart attack, high blood pressure, and some cancers.  Keep your heart, lungs, bones, muscles, and joints strong and healthy.  How can you make being active part of your life?  Start slowly. Make it your long-term goal to get at least 30 minutes of exercise on most days of the week. Walking is a good choice. You also may want to do other activities, such as running, swimming, cycling, or playing tennis or team sports.  Pick activities that you like--ones that make your heart beat faster, your muscles stronger, and your muscles and joints more flexible. If you find more than one thing you like doing, do them all. You don't have to do the same thing every day.  Get your heart pumping every day. Any activity that makes your heart beat faster and keeps it at that rate for a while counts.  Here are some great ways to get your heart beating faster:  Go for a brisk walk, run, or bike ride.  Go for a hike or swim.  Go in-line skating.  Play a game of touch football, basketball, or soccer.  Ride a bike.  Play tennis or racquetball.  Climb stairs.  Even some household chores can be aerobic--just do them at a faster pace. Vacuuming, raking or mowing the lawn, sweeping the garage, and washing and waxing the car all can help get your heart rate up.  Strengthen your muscles during the week. You don't have to lift heavy weights or grow big, bulky muscles to get  "stronger. Doing a few simple activities that make your muscles work against, or \"resist,\" something can help you get stronger.  For example, you can:  Do push-ups or sit-ups, which use your own body weight as resistance.  Lift weights or dumbbells or use stretch bands at home or in a gym or community center.  Stretch your muscles often. Stretching will help you as you become more active. It can help you stay flexible, loosen tight muscles, and avoid injury. It can also help improve your balance and posture and can be a great way to relax.  Be sure to stretch the muscles you'll be using when you work out. It's best to warm your muscles slightly before you stretch them. Walk or do some other light aerobic activity for a few minutes, and then start stretching.  When you stretch your muscles:  Do it slowly. Stretching is not about going fast or making sudden movements.  Don't push or bounce during a stretch.  Hold each stretch for at least 15 to 30 seconds, if you can. You should feel a stretch in the muscle, but not pain.  Breathe out as you do the stretch. Then breathe in as you hold the stretch. Don't hold your breath.  If you're worried about how more activity might affect your health, have a checkup before you start. Follow any special advice your doctor gives you for getting a smart start.  Where can you learn more?  Go to https://www.[employer].net/patiented  Enter W332 in the search box to learn more about \"Learning About Being Physically Active.\"  Current as of: October 10, 2022               Content Version: 13.7    9871-1458 [employer].   Care instructions adapted under license by your healthcare professional. If you have questions about a medical condition or this instruction, always ask your healthcare professional. [employer] disclaims any warranty or liability for your use of this information.      Learning About Dietary Guidelines  What are the Dietary Guidelines for " Americans?     Dietary Guidelines for Americans provide tips for eating well and staying healthy. This helps reduce the risk for long-term (chronic) diseases.  These guidelines recommend that you:  Eat and drink the right amount for you. The U.[place] government's food guide is called MyPlate. It can help you make your own well-balanced eating plan.  Try to balance your eating with your activity. This helps you stay at a healthy weight.  Drink alcohol in moderation, if at all.  Limit foods high in salt, saturated fat, trans fat, and added sugar.  These guidelines are from the U.S. Department of Agriculture and the U.S. Department of Health and Human Services. They are updated every 5 years.  What is MyPlate?  MyPlate is the U.[place] government's food guide. It can help you make your own well-balanced eating plan. A balanced eating plan means that you eat enough, but not too much, and that your food gives you the nutrients you need to stay healthy.  MyPlate focuses on eating plenty of whole grains, fruits, and vegetables, and on limiting fat and sugar. It is available online at www.ChooseMyPlate.gov.  How can you get started?  If you're trying to eat healthier, you can slowly change your eating habits over time. You don't have to make big changes all at once. Start by adding one or two healthy foods to your meals each day.  Grains  Choose whole-grain breads and cereals and whole-wheat pasta and whole-grain crackers.  Vegetables  Eat a variety of vegetables every day. They have lots of nutrients and are part of a heart-healthy diet.  Fruits  Eat a variety of fruits every day. Fruits contain lots of nutrients. Choose fresh fruit instead of fruit juice.  Protein foods  Choose fish and lean poultry more often. Eat red meat and fried meats less often. Dried beans, tofu, and nuts are also good sources of protein.  Dairy  Choose low-fat or fat-free products from this food group. If you have problems digesting milk, try eating cheese  "or yogurt instead.  Fats and oils  Limit fats and oils if you're trying to cut calories. Choose healthy fats when you cook. These include canola oil and olive oil.  Where can you learn more?  Go to https://www.[employer].net/patiented  Enter D676 in the search box to learn more about \"Learning About Dietary Guidelines.\"  Current as of: March 1, 2023               Content Version: 13.7    3434-7341 [employer].   Care instructions adapted under license by your healthcare professional. If you have questions about a medical condition or this instruction, always ask your healthcare professional. [employer] disclaims any warranty or liability for your use of this information.      Activities of Daily Living    Your Health Risk Assessment indicates you have difficulties with activities of daily living such as housework, bathing, preparing meals, taking medication, etc. Please make a follow up appointment for us to address this issue in more detail.  Your Health Risk Assessment indicates you feel you are not in good emotional health.    Recreation   Recreation is not limited to sports and team events. It includes any activity that provides relaxation, interest, enjoyment, and exercise. Recreation provides an outlet for physical, mental, and social energy. It can give a sense of worth and achievement. It can help you stay healthy.    Mental Exercise and Social Involvement  Mental and emotional health is as important as physical health. Keep in touch with friends and family. Stay as active as possible. Continue to learn and challenge yourself.   Things you can do to stay mentally active are:  Learn something new, like a foreign language or musical instrument.   Play SCRABBLE or do crossword puzzles. If you cannot find people to play these games with you at home, you can play them with others on your computer through the Internet.   Join a games club--anything from card games to chess or checkers or " lawn bowling.   Start a new hobby.   Go back to school.   Volunteer.   Read.   Keep up with world events.  Learning About Depression Screening  What is depression screening?  Depression screening is a way to see if you have depression symptoms. It may be done by a doctor or counselor. It's often part of a routine checkup. That's because your mental health is just as important as your physical health.  Depression is a mental health condition that affects how you feel, think, and act. You may:  Have less energy.  Lose interest in your daily activities.  Feel sad and grouchy for a long time.  Depression is very common. It affects people of all ages.  Many things can lead to depression. Some people become depressed after they have a stroke or find out they have a major illness like cancer or heart disease. The death of a loved one or a breakup may lead to depression. It can run in families. Most experts believe that a combination of inherited genes and stressful life events can cause it.  What happens during screening?  You may be asked to fill out a form about your depression symptoms. You and the doctor will discuss your answers. The doctor may ask you more questions to learn more about how you think, act, and feel.  What happens after screening?  If you have symptoms of depression, your doctor will talk to you about your options.  Doctors usually treat depression with medicines or counseling. Often, combining the two works best. Many people don't get help because they think that they'll get over the depression on their own. But people with depression may not get better unless they get treatment.  The cause of depression is not well understood. There may be many factors involved. But if you have depression, it's not your fault.  A serious symptom of depression is thinking about death or suicide. If you or someone you care about talks about this or about feeling hopeless, get help right away.  It's important to know  "that depression can be treated. Medicine, counseling, and self-care may help.  Where can you learn more?  Go to https://www.[employer].net/patiented  Enter T185 in the search box to learn more about \"Learning About Depression Screening.\"  Current as of: October 20, 2022               Content Version: 13.7    5100-2341 [employer].   Care instructions adapted under license by your healthcare professional. If you have questions about a medical condition or this instruction, always ask your healthcare professional. [employer] disclaims any warranty or liability for your use of this information.      Preventing Falls: Care Instructions    Talk to your doctor about the medicines you take. Ask if any of them increase the risk of falls and whether they can be changed or stopped.   Try to exercise regularly. It can help improve your strength and balance. This can help lower your risk of falling.     Practice fall safety and prevention.    Wear low-heeled shoes that fit well and give your feet good support. Talk to your doctor if you have foot problems that make this hard.  Carry a cellphone or wear a medical alert device that you can use to call for help.  Use stepladders instead of chairs to reach high objects. Don't climb if you're at risk for falls. Ask for help, if needed.  Wear the correct eyeglasses, if you need them.    Make your home safer.    Remove rugs, cords, clutter, and furniture from walkways.  Keep your house well lit. Use night-lights in hallways and bathrooms.  Install and use sturdy handrails on stairways.  Wear nonskid footwear, even inside. Don't walk barefoot or in socks without shoes.    Be safe outside.    Use handrails, curb cuts, and ramps whenever possible.  Keep your hands free by using a shoulder bag or backpack.  Try to walk in well-lit areas. Watch out for uneven ground, changes in pavement, and debris.  Be careful in the winter. Walk on the grass or gravel when " "sidewalks are slippery. Use de-icer on steps and walkways. Add non-slip devices to shoes.    Put grab bars and nonskid mats in your shower or tub and near the toilet. Try to use a shower chair or bath bench when bathing.   Get into a tub or shower by putting in your weaker leg first. Get out with your strong side first. Have a phone or medical alert device in the bathroom with you.   Where can you learn more?  Go to https://www.[employer].net/patiented  Enter G117 in the search box to learn more about \"Preventing Falls: Care Instructions.\"  Current as of: November 9, 2022               Content Version: 13.7 2006-2023 [employer].   Care instructions adapted under license by your healthcare professional. If you have questions about a medical condition or this instruction, always ask your healthcare professional. [employer] disclaims any warranty or liability for your use of this information.      How to Get Up Safely After a Fall: Care Instructions  Overview     If you have injuries, health problems, or other reasons that may make it easy for you to fall at home, it is a good idea to learn how to get up safely after a fall. Learning how to get up correctly can help you avoid making an injury worse.  Also, knowing what to do if you cannot get up can help you stay safe until help arrives.  Follow-up care is a key part of your treatment and safety. Be sure to make and go to all appointments, and call your doctor if you are having problems. It's also a good idea to know your test results and keep a list of the medicines you take.  How can you care for yourself after a fall?  If you think you can get up  First lie still for a few minutes and think about how you feel. If your body feels okay and you think you can get up safely, follow the rest of the steps below:  Look for a chair or other piece of furniture that is close to you.  Roll onto your side and rest. Roll by turning your head in the " direction you want to roll, move your shoulder and arm, then hip and leg in the same direction.  Lie still for a moment to let your blood pressure adjust.  Slowly push your upper body up, lift your head, and take a moment to rest.  Slowly get up on your hands and knees, and crawl to the chair or other stable piece of furniture.  Put your hands on the chair.  Move one foot forward, and place it flat on the floor. Your other leg should be bent with the knee on the floor.  Rise slowly, turn your body, and sit in the chair. Stay seated for a bit and think about how you feel. Call for help. Even if you feel okay, let someone know what happened to you. You might not know that you have a serious injury.  If you cannot get up  If you think you are injured after a fall or you cannot get up, try not to panic.  Call out for help.  If you have a phone within reach or you have an emergency call device, use it to call for help.  If you do not have a phone within reach, try to slide yourself toward it. If you cannot get to the phone, try to slide toward a door or window or a place where you think you can be heard.  [place] or use an object to make noise so someone might hear you.  If you can reach something that you can use for a pillow, place it under your head. Try to stay warm by covering yourself with a blanket or clothing while you wait for help.  When should you call for help?   Call 911 anytime you think you may need emergency care. For example, call if:    You passed out (lost consciousness).     You cannot get up after a fall.     You have severe pain.   Call your doctor now or seek immediate medical care if:    You have new or worse pain.     You are dizzy or lightheaded.     You hit your head.   Watch closely for changes in your health, and be sure to contact your doctor if:    You do not get better as expected.   Where can you learn more?  Go to https://www.healthwise.net/patiented  Enter G513 in the search box to learn  "more about \"How to Get Up Safely After a Fall: Care Instructions.\"  Current as of: November 14, 2022               Content Version: 13.7 2006-2023 [employer].   Care instructions adapted under license by your healthcare professional. If you have questions about a medical condition or this instruction, always ask your healthcare professional. [employer] disclaims any warranty or liability for your use of this information.         "

## 2023-12-05 ENCOUNTER — TELEPHONE (OUTPATIENT)
Dept: FAMILY MEDICINE | Facility: CLINIC | Age: 88
End: 2023-12-05
Payer: COMMERCIAL

## 2023-12-05 DIAGNOSIS — F03.918 DEMENTIA WITH BEHAVIORAL DISTURBANCE (H): Primary | ICD-10-CM

## 2023-12-05 NOTE — TELEPHONE ENCOUNTER
Order/Referral Request    Who is requesting: Daughter in Law, Crista (has same name as patient), no CC on file    Orders being requested: Referral for Neurology Eval    Reason service is needed/diagnosis: Sundowners symptoms have escalated within the last week    When are orders needed by: ASAP please    Has this been discussed with Provider: Yes, addressed last office visit 9/25/23    Does patient have a preference on a Group/Provider/Facility? [facility]    Does patient have an appointment scheduled?: No    Where to send orders: Place orders within Epic    Okay to leave a detailed message?: No at Other phone number:  [phone], Daughter in Law Crista. Stated that it's ok to leave  but we don't have CC on file to communicate with her

## 2023-12-05 NOTE — TELEPHONE ENCOUNTER
I have placed a referral.  The  services will be contacting.  Please leave message for patient on the preferred phone  Please asked daughter-in-law to get consent for future communication    Alva Zimmer MD

## 2023-12-06 NOTE — TELEPHONE ENCOUNTER
FYI - Status Update    Who is Calling: family member, Daughter in Law Crista    Update: Called back to check on status of referral, informed that referral/order has been placed and that we also need Consent to Communicate on file. Stated that she will stop in at clinic with mother in law on Friday to have her fill that out.    Does caller want a call/response back: No

## 2024-02-09 ENCOUNTER — TRANSFERRED RECORDS (OUTPATIENT)
Dept: HEALTH INFORMATION MANAGEMENT | Facility: CLINIC | Age: 89
End: 2024-02-09
Payer: COMMERCIAL

## 2024-03-18 ENCOUNTER — APPOINTMENT (OUTPATIENT)
Dept: RADIOLOGY | Facility: HOSPITAL | Age: 89
End: 2024-03-18
Attending: EMERGENCY MEDICINE
Payer: COMMERCIAL

## 2024-03-18 ENCOUNTER — HOSPITAL ENCOUNTER (EMERGENCY)
Facility: HOSPITAL | Age: 89
Discharge: HOME | End: 2024-03-18
Attending: EMERGENCY MEDICINE | Admitting: EMERGENCY MEDICINE
Payer: COMMERCIAL

## 2024-03-18 VITALS
HEART RATE: 57 BPM | RESPIRATION RATE: 27 BRPM | OXYGEN SATURATION: 98 % | TEMPERATURE: 97.6 F | SYSTOLIC BLOOD PRESSURE: 169 MMHG | DIASTOLIC BLOOD PRESSURE: 77 MMHG

## 2024-03-18 DIAGNOSIS — R07.9 CHEST PAIN, UNSPECIFIED TYPE: ICD-10-CM

## 2024-03-18 LAB
ANION GAP SERPL CALCULATED.3IONS-SCNC: 10 MMOL/L (ref 7–15)
BASOPHILS # BLD AUTO: 0 10E3/UL (ref 0–0.2)
BASOPHILS NFR BLD AUTO: 0 %
BUN SERPL-MCNC: 7.9 MG/DL (ref 8–23)
CALCIUM SERPL-MCNC: 9.3 MG/DL (ref 8.8–10.2)
CHLORIDE SERPL-SCNC: 105 MMOL/L (ref 98–107)
CREAT SERPL-MCNC: 0.85 MG/DL (ref 0.51–0.95)
DEPRECATED HCO3 PLAS-SCNC: 25 MMOL/L (ref 22–29)
EGFRCR SERPLBLD CKD-EPI 2021: 66 ML/MIN/1.73M2
EOSINOPHIL # BLD AUTO: 0 10E3/UL (ref 0–0.7)
EOSINOPHIL NFR BLD AUTO: 0 %
ERYTHROCYTE [DISTWIDTH] IN BLOOD BY AUTOMATED COUNT: 22.7 % (ref 10–15)
GLUCOSE SERPL-MCNC: 92 MG/DL (ref 70–99)
HCT VFR BLD AUTO: 33.4 % (ref 35–47)
HGB BLD-MCNC: 11.3 G/DL (ref 11.7–15.7)
HOLD SPECIMEN: NORMAL
IMM GRANULOCYTES # BLD: 0 10E3/UL
IMM GRANULOCYTES NFR BLD: 0 %
LYMPHOCYTES # BLD AUTO: 1.8 10E3/UL (ref 0.8–5.3)
LYMPHOCYTES NFR BLD AUTO: 32 %
MCH RBC QN AUTO: 31.3 PG (ref 26.5–33)
MCHC RBC AUTO-ENTMCNC: 33.8 G/DL (ref 31.5–36.5)
MCV RBC AUTO: 93 FL (ref 78–100)
MONOCYTES # BLD AUTO: 1 10E3/UL (ref 0–1.3)
MONOCYTES NFR BLD AUTO: 17 %
NEUTROPHILS # BLD AUTO: 2.8 10E3/UL (ref 1.6–8.3)
NEUTROPHILS NFR BLD AUTO: 51 %
NRBC # BLD AUTO: 0 10E3/UL
NRBC BLD AUTO-RTO: 0 /100
PLATELET # BLD AUTO: 174 10E3/UL (ref 150–450)
POTASSIUM SERPL-SCNC: 4.9 MMOL/L (ref 3.4–5.3)
RBC # BLD AUTO: 3.61 10E6/UL (ref 3.8–5.2)
SODIUM SERPL-SCNC: 140 MMOL/L (ref 135–145)
TROPONIN T SERPL HS-MCNC: 15 NG/L
TROPONIN T SERPL HS-MCNC: 16 NG/L
WBC # BLD AUTO: 5.6 10E3/UL (ref 4–11)

## 2024-03-18 PROCEDURE — 99285 EMERGENCY DEPT VISIT HI MDM: CPT | Mod: 25

## 2024-03-18 PROCEDURE — 82565 ASSAY OF CREATININE: CPT | Performed by: EMERGENCY MEDICINE

## 2024-03-18 PROCEDURE — 36415 COLL VENOUS BLD VENIPUNCTURE: CPT | Performed by: EMERGENCY MEDICINE

## 2024-03-18 PROCEDURE — 71046 X-RAY EXAM CHEST 2 VIEWS: CPT

## 2024-03-18 PROCEDURE — 84484 ASSAY OF TROPONIN QUANT: CPT | Performed by: EMERGENCY MEDICINE

## 2024-03-18 PROCEDURE — 93005 ELECTROCARDIOGRAM TRACING: CPT | Performed by: EMERGENCY MEDICINE

## 2024-03-18 PROCEDURE — 85025 COMPLETE CBC W/AUTO DIFF WBC: CPT | Performed by: EMERGENCY MEDICINE

## 2024-03-18 RX ORDER — ASPIRIN 81 MG/1
324 TABLET, CHEWABLE ORAL ONCE
Status: COMPLETED | OUTPATIENT
Start: 2024-03-18 | End: 2024-03-18

## 2024-03-18 ASSESSMENT — ACTIVITIES OF DAILY LIVING (ADL)
ADLS_ACUITY_SCORE: 38

## 2024-03-18 NOTE — ED TRIAGE NOTES
Pt presents to ED via [facility] from [facility] with c/o midsternal chest pressure that started an hour ago. Pt was given nitroglycerin at care facility that relieved pain. Pt denying pain at this time. Given 324 ASA by EMS.      Triage Assessment (Adult)       Row Name 03/18/24 0411          Triage Assessment    Airway WDL WDL        Respiratory WDL    Respiratory WDL WDL        Cardiac WDL    Cardiac WDL WDL

## 2024-03-18 NOTE — ED NOTES
Bed: JNED-20  Expected date: 3/18/24  Expected time: 4:07 AM  Means of arrival: Ambulance  Comments:

## 2024-03-18 NOTE — ED PROVIDER NOTES
7:31 AM - Patient signed out to me by Dr. Granger at routine shift change. 88 year old female with history of dementia (lives at [facility]), known 95% RCA stenosis (not amenable to PCI, not felt to be good candidate for CABG previously) presenting from [facility] after staff saw her clutch her chest. Patient did not complain of any pain and denies any symptoms currently. Son at bedside and states patient acting baseline. EKG with no ST changes. Plan is follow up repeat troponin and discharge if reassuring against ACS. Please see Dr. Granger's note for details.    7:38 AM - Repeat troponin reassuring against ACS. Ok for ongoing outpatient medical management of known CAD. Son agreeable with this plan. Return precautions and need for PCP follow up discussed and understood. No further questions at the time of discharge.    --------------------------------------------------------------------------------   --------------------------------------------------------------------------------     IMPRESSION  1. Chest pain, unspecified type        PLAN  - close PCP follow up  - discharge to home        Chandan Wood MD  03/18/24  Emergency Medicine  [facility] EMERGENCY DEPARTMENT  [address] [place] [id number] [phone]  Dept: [phone]     Chandan Wood MD  03/18/24 0739

## 2024-03-18 NOTE — ED PROVIDER NOTES
EMERGENCY DEPARTMENT ENCOUNTER      NAME: Crista Harman  AGE: 88 year old female  YOB: 1935  MRN: [id number]  EVALUATION DATE & TIME: 3/18/2024  4:07 AM    PCP: Alva Zimmer    ED PROVIDER: April Granger MD    Chief Complaint   Patient presents with    Chest Pain         FINAL IMPRESSION:  1. Chest pain, unspecified type          ED COURSE & MEDICAL DECISION MAKING:    Pertinent Labs & Imaging studies reviewed. (See chart for details)  88 year old female with history of HTN, HLD, CAD with known 95% RCA lesion that we have been unable to stent and have been managed medically, was deemed not a candidate for CABG, dementia who presents to the Emergency Department for evaluation of possible chest pain from her care facility where staff witnessed her clutching her chest, and then they thought she looked lightheaded.  It does not sound like she ever actually verbalize any complaints though was given a dose of nitroglycerin by them.  Patient has no complaints at this time and per the son at bedside it sounds like she will often clutch her chest.  I am sure she probably does have some anginal symptoms with this known RCA lesion.  She does take nitroglycerin with some frequency.  She is however symptom-free at this time.  Certainly ACS is on the differential, though it sounds like unfortunately there is nothing much that we can do about this RCA lesion as we were not able to get past it interventionally and she is not a candidate for surgery.  She is on long-acting Imdur, and is compliant with this.  Differential includes esophageal spasm, GERD.    Patient placed on monitor, IV established and blood obtained.  Twelve-lead EKG was obtained showing sinus bradycardia.  No ischemic changes.  CBC, BMP, troponin were obtained and notable for troponin of 16.  She has remained symptom-free during her ED visit.  Son at bedside is comfortable with plan for delta troponin and home if this is not changing, as he  would like to avoid hospital admission given delirium with her previous admissions.  Delta troponin pending at time of dictation.  Patient signed out to oncoming physician awaiting results of same for plan for discharge to home if unremarkable.          ED Course as of 03/18/24 0735   Mon Mar 18, 2024   0511 Chest XR,  PA & LAT  Chest x-ray independently interpreted by myself.  No evidence of cardiomegaly, infiltrate, effusion, mediastinal widening   0544 Troponin T, High Sensitivity(!): 16       Medical Decision Making    History:  Supplemental history from: Caregiver, Family Member/Significant Other, and EMS  External Record(s) reviewed: Documented in chart    Work Up:  Chart documentation includes differential considered and any EKGs or imaging independently interpreted by provider, see MDM  In additional to work up documented, I considered the following work up: see MDM    External consultation:  Discussion of management with another provider: N/A    Complicating factors:  Care impacted by chronic illness: Dementia, Heart Disease, Hyperlipidemia, and Hypertension  Care affected by social determinants of health: Access to Medical Care weekend night shift no access to PCP    Disposition considerations: Admission considered. Patient was signed out to the oncoming physician, disposition pending.        At the conclusion of the encounter I discussed the results of all of the tests and the disposition. The questions were answered. The patient or family acknowledged understanding and was agreeable with the care plan.    MEDICATIONS GIVEN IN THE EMERGENCY:  Medications   aspirin (ASA) chewable tablet 324 mg (324 mg Oral Not Given 3/18/24 0417)       NEW PRESCRIPTIONS STARTED AT TODAY'S ER VISIT  New Prescriptions    No medications on file          =================================================================    HPI    Patient information was obtained from: The patient, EMS, son, staff at care facility    Use of  "Intrepreter: N/A        Crista Harman is a 88 year old female with pertinent medical history of hypertension, CAD, HLD, peripheral vascular disease, NSTEMI, who presents with chest pain.     Per chart review, the paetint was sadmitted in 2021 with NSTEMI. Coronary angiogram showing ostial distal RCA 95% stenosis due to lesion. She did not get a stent, contrary to notes that says she got a sent. They considered her for bypass but thought she was a high risk candidate for surgery. Her symptoms were atypical and only occuring at night and not with exertions. Thought to be related with her morning dose of nitrates wearing off. Historically, she follows with Dr. Ryan Cardiology and last seen in 2021.     The patient reports she developed chest pain an hour prior to arrival at her care facility. She received one dose of nitroglycerin that has resolved her pain. The patient is unable to locate exactly where her pain is. She states \"I get this a lot and Nitroglycerin helps\". She denies recalling any light-headedness, dizziness, nausea, vomiting, diaphoresis, shortness of breath, and any other medical concerns.    Son is at bedside.  Spoke with him as well as staff from her [facility] care facility.  It sounds like patient was walking down the hallway and per staff at [facility] care facility clutched her chest and looked like she felt lightheaded so they came to her and assisted her to a chair.  She never had any syncopal episode or perceived near syncopal episode.  Because she clutched her chest, staff administered nitroglycerin though it sounds like she never actually verbally voiced any complaints of chest pain.  The son states that she will frequently clutch her chest and they will often ask her if she has pain.  Sometimes she does and sometimes she does not and will typically give her nitro with relief.      PAST MEDICAL HISTORY:  Past Medical History:   Diagnosis Date    Acute non-ST elevation myocardial infarction " (NSTEMI) (H) 3/28/2021    CAD (coronary artery disease)     CAD (coronary artery disease)     Colitis 1/22/2015    Coronary artery disease due to lipid rich plaque 12/18/2012    Dyslipidemia, goal LDL below 70 12/18/2012    Essential hypertension 12/18/2012    History of heart attack     History of tobacco abuse     HTN (hypertension)     Hyperlipidemia     Hypertension     Peripheral vascular disease (H)        PAST SURGICAL HISTORY:  Past Surgical History:   Procedure Laterality Date    APPENDECTOMY      BACK SURGERY      CORONARY STENT PLACEMENT      2     CV CORONARY ANGIOGRAM N/A 3/28/2021    Procedure: Coronary Angiogram;  Surgeon: Narinder Ryan MD;  Location: [facility] Cardiac Cath Lab;  Service: Cardiology    CV LEFT HEART CATHETERIZATION WITH LEFT VENTRICULOGRAM N/A 3/28/2021    Procedure: Left Heart Catheterization with Left Ventriculogram;  Surgeon: Narinder Ryan MD;  Location: [facility] Cardiac Cath Lab;  Service: Cardiology    femoral stents  2011    IR ABDOMINAL AORTOGRAM  8/25/2011    IR MISCELLANEOUS PROCEDURE  8/25/2011    OOPHORECTOMY      STENT,CORONARY, S660 24/30 2007    VASCULAR SURGERY      stents legs        CURRENT MEDICATIONS:    Prior to Admission Medications   Prescriptions Last Dose Informant Patient Reported? Taking?   QUEtiapine (SEROQUEL) 25 MG tablet   No No   Sig: TAKE 1/2 TO 1 TABLET EVERY EVENING AROUND DINNER   aspirin 81 MG EC tablet   Yes No   Sig: Take 81 mg by mouth daily   atorvastatin (LIPITOR) 80 MG tablet   No No   Sig: TAKE 1 TABLET(80 MG) BY MOUTH DAILY FOR CHOLESTEROL   clopidogrel (PLAVIX) 75 MG tablet   No No   Sig: Take 1 tablet (75 mg) by mouth daily   donepezil (ARICEPT) 10 MG tablet   No No   Sig: Take 1 tablet (10 mg) by mouth At Bedtime Dose increase   isosorbide mononitrate (IMDUR) 30 MG 24 hr tablet   No No   Sig: TAKE 2 TABLETS(60 MG) BY MOUTH EVERY EVENING   nitroGLYcerin (NITROSTAT) 0.4 MG sublingual tablet   No No   Sig: PLACE ONE TABLET UNDER  TONGUE AS NEEDED FOR CHEST PAIN EVERY 5 MINUTES UP TO 3 TIMES AND IF NO RELIEF CALL 911   omeprazole (PRILOSEC) 20 MG DR capsule   No No   Sig: TAKE 1 CAPSULE(20 MG) BY MOUTH DAILY BEFORE BREAKFAST      Facility-Administered Medications: None       ALLERGIES:  Allergies   Allergen Reactions    Nickel Rash       FAMILY HISTORY:  Family History   Problem Relation Age of Onset    Coronary Artery Disease Father     Prostate Cancer Father     Diabetes No family hx of     Hypertension No family hx of     Breast Cancer No family hx of     Colon Cancer No family hx of     Other Cancer No family hx of     Kidney Disease Mother     Heart Disease Father     Heart Disease Brother     Hypertension Brother        SOCIAL HISTORY:  Social History     Tobacco Use    Smoking status: Every Day     Types: Cigarettes     Last attempt to quit: 2007     Years since quittin.1    Smokeless tobacco: Never   Substance Use Topics    Alcohol use: Yes     Comment: Alcoholic Drinks/day: occasional beer or bloody randall    Drug use: No        VITALS:  Patient Vitals for the past 24 hrs:   BP Temp Temp src Pulse Resp SpO2   24 0700 (!) 160/74 -- -- 55 22 98 %   24 0530 (!) 173/70 -- -- 58 20 98 %   24 0445 (!) 181/81 -- -- 60 17 98 %   24 0430 (!) 180/84 -- -- 58 25 98 %   24 0423 (!) 188/85 -- -- 60 26 99 %   24 0415 (!) 223/95 97.6  F (36.4  C) Oral 61 18 99 %       PHYSICAL EXAM    General Appearance: Well-appearing, well-nourished, no acute distress   Head:  Normocephalic  Eyes:  conjunctiva/corneas clear  ENT:  membranes are moist without pallor  Neck:  Supple  Chest:  No tenderness or deformity, no crepitus  Cardio:  Regular rate and rhythm, no murmur/gallop/rub, 2+ pulses symmetric in all extremities, hypertensive normalized on recheck  Pulm:  No respiratory distress, clear to auscultation bilaterally  Back:  No CVA tenderness, normal ROM  Abdomen:  Soft, non-tender, non distended,no rebound or  guarding.  Extremities: Moves extremities normally, normal gait  Skin:  Skin warm, dry, no rashes  Neuro:  Alert and oriented × to person, place but not time, moving all extremities, no gross sensory defects baseline mental status per family at bedside     RADIOLOGY/LABS:  Reviewed all pertinent imaging. Please see official radiology report. All pertinent labs reviewed and interpreted.    Results for orders placed or performed during the hospital encounter of 03/18/24   Chest XR,  PA & LAT    Impression    IMPRESSION: No evidence of active cardiopulmonary disease.    Basic metabolic panel   Result Value Ref Range    Sodium 140 135 - 145 mmol/L    Potassium 4.9 3.4 - 5.3 mmol/L    Chloride 105 98 - 107 mmol/L    Carbon Dioxide (CO2) 25 22 - 29 mmol/L    Anion Gap 10 7 - 15 mmol/L    Urea Nitrogen 7.9 (L) 8.0 - 23.0 mg/dL    Creatinine 0.85 0.51 - 0.95 mg/dL    GFR Estimate 66 >60 mL/min/1.73m2    Calcium 9.3 8.8 - 10.2 mg/dL    Glucose 92 70 - 99 mg/dL   Result Value Ref Range    Troponin T, High Sensitivity 16 (H) <=14 ng/L   CBC with platelets and differential   Result Value Ref Range    WBC Count 5.6 4.0 - 11.0 10e3/uL    RBC Count 3.61 (L) 3.80 - 5.20 10e6/uL    Hemoglobin 11.3 (L) 11.7 - 15.7 g/dL    Hematocrit 33.4 (L) 35.0 - 47.0 %    MCV 93 78 - 100 fL    MCH 31.3 26.5 - 33.0 pg    MCHC 33.8 31.5 - 36.5 g/dL    RDW 22.7 (H) 10.0 - 15.0 %    Platelet Count 174 150 - 450 10e3/uL    % Neutrophils 51 %    % Lymphocytes 32 %    % Monocytes 17 %    % Eosinophils 0 %    % Basophils 0 %    % Immature Granulocytes 0 %    NRBCs per 100 WBC 0 <1 /100    Absolute Neutrophils 2.8 1.6 - 8.3 10e3/uL    Absolute Lymphocytes 1.8 0.8 - 5.3 10e3/uL    Absolute Monocytes 1.0 0.0 - 1.3 10e3/uL    Absolute Eosinophils 0.0 0.0 - 0.7 10e3/uL    Absolute Basophils 0.0 0.0 - 0.2 10e3/uL    Absolute Immature Granulocytes 0.0 <=0.4 10e3/uL    Absolute NRBCs 0.0 10e3/uL   Extra Blue Top Tube   Result Value Ref Range    Hold Specimen JIC         EKG:  Performed at: 03/18/2024 at 04:29    Impression: Sinus bradycardia with premature supraventricular complexes, Nonspecific ST and T wave abnormality    Rate: 59 bpm  Rhythm: Sinus bradycardia with premature supraventricular complexes  Axis: 70  KY Interval: 186 ms  QRS Interval: 88 ms  QTc Interval: 451 ms  ST Changes: Improved lateral T wave  Comparison: ECG of 04/15/2022 at 20:19, premature supraventricular complexes are now present  I have independently reviewed and interpreted the EKG(s) documented above.      The creation of this record is based on the scribe s observations of the work being performed by April Granger MD and the provider s statements to them. It was created on her behalf by Balbir Moore, a trained medical scribe. This document has been checked and approved by the attending provider.    April Granger MD  Emergency Medicine  [facility] EMERGENCY DEPARTMENT  [facility] [facility] [address]  [phone]  Dept: [phone]     April Granger MD  03/18/24 0735

## 2024-03-21 ENCOUNTER — HOSPITAL ENCOUNTER (EMERGENCY)
Facility: HOSPITAL | Age: 89
Discharge: HOME | End: 2024-03-21
Attending: EMERGENCY MEDICINE | Admitting: EMERGENCY MEDICINE
Payer: COMMERCIAL

## 2024-03-21 VITALS
RESPIRATION RATE: 11 BRPM | HEART RATE: 65 BPM | DIASTOLIC BLOOD PRESSURE: 81 MMHG | OXYGEN SATURATION: 98 % | TEMPERATURE: 97.7 F | SYSTOLIC BLOOD PRESSURE: 187 MMHG

## 2024-03-21 DIAGNOSIS — R55 SYNCOPE, UNSPECIFIED SYNCOPE TYPE: ICD-10-CM

## 2024-03-21 LAB
ANION GAP SERPL CALCULATED.3IONS-SCNC: 9 MMOL/L (ref 7–15)
BASOPHILS # BLD AUTO: 0 10E3/UL (ref 0–0.2)
BASOPHILS NFR BLD AUTO: 0 %
BUN SERPL-MCNC: 8.6 MG/DL (ref 8–23)
CALCIUM SERPL-MCNC: 8.8 MG/DL (ref 8.8–10.2)
CHLORIDE SERPL-SCNC: 107 MMOL/L (ref 98–107)
CREAT SERPL-MCNC: 0.73 MG/DL (ref 0.51–0.95)
DEPRECATED HCO3 PLAS-SCNC: 27 MMOL/L (ref 22–29)
EGFRCR SERPLBLD CKD-EPI 2021: 80 ML/MIN/1.73M2
EOSINOPHIL # BLD AUTO: 0 10E3/UL (ref 0–0.7)
EOSINOPHIL NFR BLD AUTO: 1 %
ERYTHROCYTE [DISTWIDTH] IN BLOOD BY AUTOMATED COUNT: 23 % (ref 10–15)
GLUCOSE SERPL-MCNC: 71 MG/DL (ref 70–99)
HCT VFR BLD AUTO: 33.6 % (ref 35–47)
HGB BLD-MCNC: 11.2 G/DL (ref 11.7–15.7)
HOLD SPECIMEN: NORMAL
IMM GRANULOCYTES # BLD: 0 10E3/UL
IMM GRANULOCYTES NFR BLD: 0 %
LYMPHOCYTES # BLD AUTO: 1.9 10E3/UL (ref 0.8–5.3)
LYMPHOCYTES NFR BLD AUTO: 32 %
MCH RBC QN AUTO: 31.2 PG (ref 26.5–33)
MCHC RBC AUTO-ENTMCNC: 33.3 G/DL (ref 31.5–36.5)
MCV RBC AUTO: 94 FL (ref 78–100)
MONOCYTES # BLD AUTO: 1 10E3/UL (ref 0–1.3)
MONOCYTES NFR BLD AUTO: 17 %
NEUTROPHILS # BLD AUTO: 3 10E3/UL (ref 1.6–8.3)
NEUTROPHILS NFR BLD AUTO: 50 %
NRBC # BLD AUTO: 0 10E3/UL
NRBC BLD AUTO-RTO: 0 /100
PLATELET # BLD AUTO: 183 10E3/UL (ref 150–450)
POTASSIUM SERPL-SCNC: 4.3 MMOL/L (ref 3.4–5.3)
RBC # BLD AUTO: 3.59 10E6/UL (ref 3.8–5.2)
SODIUM SERPL-SCNC: 143 MMOL/L (ref 135–145)
WBC # BLD AUTO: 6.1 10E3/UL (ref 4–11)

## 2024-03-21 PROCEDURE — 82374 ASSAY BLOOD CARBON DIOXIDE: CPT | Performed by: EMERGENCY MEDICINE

## 2024-03-21 PROCEDURE — 96374 THER/PROPH/DIAG INJ IV PUSH: CPT

## 2024-03-21 PROCEDURE — 36415 COLL VENOUS BLD VENIPUNCTURE: CPT | Performed by: EMERGENCY MEDICINE

## 2024-03-21 PROCEDURE — 82565 ASSAY OF CREATININE: CPT | Performed by: EMERGENCY MEDICINE

## 2024-03-21 PROCEDURE — 258N000003 HC RX IP 258 OP 636: Performed by: EMERGENCY MEDICINE

## 2024-03-21 PROCEDURE — 96361 HYDRATE IV INFUSION ADD-ON: CPT

## 2024-03-21 PROCEDURE — 250N000013 HC RX MED GY IP 250 OP 250 PS 637: Performed by: EMERGENCY MEDICINE

## 2024-03-21 PROCEDURE — 99284 EMERGENCY DEPT VISIT MOD MDM: CPT | Mod: 25

## 2024-03-21 PROCEDURE — 85004 AUTOMATED DIFF WBC COUNT: CPT | Performed by: EMERGENCY MEDICINE

## 2024-03-21 PROCEDURE — 93005 ELECTROCARDIOGRAM TRACING: CPT | Performed by: STUDENT IN AN ORGANIZED HEALTH CARE EDUCATION/TRAINING PROGRAM

## 2024-03-21 PROCEDURE — 250N000011 HC RX IP 250 OP 636: Performed by: EMERGENCY MEDICINE

## 2024-03-21 RX ORDER — OLANZAPINE 10 MG/1
2.5 INJECTION, POWDER, LYOPHILIZED, FOR SOLUTION INTRAMUSCULAR ONCE
Status: COMPLETED | OUTPATIENT
Start: 2024-03-21 | End: 2024-03-21

## 2024-03-21 RX ORDER — OLANZAPINE 10 MG/1
1.25 INJECTION, POWDER, LYOPHILIZED, FOR SOLUTION INTRAMUSCULAR ONCE
Status: COMPLETED | OUTPATIENT
Start: 2024-03-21 | End: 2024-03-21

## 2024-03-21 RX ORDER — OLANZAPINE 5 MG/1
5 TABLET, ORALLY DISINTEGRATING ORAL ONCE
Status: COMPLETED | OUTPATIENT
Start: 2024-03-21 | End: 2024-03-21

## 2024-03-21 RX ADMIN — OLANZAPINE 5 MG: 5 TABLET, ORALLY DISINTEGRATING ORAL at 16:05

## 2024-03-21 RX ADMIN — SODIUM CHLORIDE 500 ML: 9 INJECTION, SOLUTION INTRAVENOUS at 14:11

## 2024-03-21 RX ADMIN — OLANZAPINE 2.5 MG: 10 INJECTION, POWDER, FOR SOLUTION INTRAMUSCULAR at 16:01

## 2024-03-21 RX ADMIN — OLANZAPINE 1.25 MG: 10 INJECTION, POWDER, FOR SOLUTION INTRAMUSCULAR at 15:33

## 2024-03-21 ASSESSMENT — ACTIVITIES OF DAILY LIVING (ADL)
ADLS_ACUITY_SCORE: 38

## 2024-03-21 NOTE — ED PROVIDER NOTES
"EMERGENCY DEPARTMENT ENCOUNTER            IMPRESSION:  Syncopal episode  History of dementia      MEDICAL DECISION MAKING:  It was my pleasure to provide care for Crista Harman who presented by ambulance from [facility] for syncopal episode.  Patient is a resident of the [facility].  Witnessed syncope by the staff.  Patient arrives without any complaints.  Initially she was cooperative    On my exam patient is pleasant and cooperative.   Vital signs show hypertension.  Physical exam notable for she has some memory impairment but otherwise is awake and alert..     IV fluid administered for symptom relief.  Patient's symptoms improved.     EKG independently interpreted by myself and does not show any arrhythmia or ischemia    Laboratory investigation independently interpreted and notable for baseline anemia but otherwise normal    Patient became uncooperative and eventually was given Zyprexa and placed in soft restraints    I spoke with the family.    Family member arrived and results of the evaluation were discussed.  Family member has agreed to escort her back to the care center    =================================================================  CHIEF COMPLAINT:  Chief Complaint   Patient presents with    Syncope    Depression         HPI  Crista Harman is a 88 year old female with a history of Dementia, HTN, HLD, CAD, PVD, who presents to the ED by EMS for evaluation of syncope.     Per RN, patient arrives from her memory care facility. She recently moved in there and the staff has seen an increase in depression. Today there were alarms that went off and it appeared that she was overstimulated by them. The patient was found to have slumped down on the couch and was unconscious for approximately two minutes    Per patient, she said she feels \"okay,\" and has not yet eaten today. Patient got herself dressed today.     Per chart review, patient presented to [facility] on 3/18/24 for " chest pain. Labs remarkable for troponin of 16.  EKG showed sinus bradycardia. Patient discharged home after stable repeat troponin.    REVIEW OF SYSTEMS  Limited by memory impairment    Remainder of systems reviewed, unless noted in HPI all others negative.      PAST MEDICAL HISTORY:  Past Medical History:   Diagnosis Date    Acute non-ST elevation myocardial infarction (NSTEMI) (H) 3/28/2021    CAD (coronary artery disease)     CAD (coronary artery disease)     Colitis 1/22/2015    Coronary artery disease due to lipid rich plaque 12/18/2012    Dyslipidemia, goal LDL below 70 12/18/2012    Essential hypertension 12/18/2012    History of heart attack     History of tobacco abuse     HTN (hypertension)     Hyperlipidemia     Hypertension     Peripheral vascular disease (H24)        PAST SURGICAL HISTORY:  Past Surgical History:   Procedure Laterality Date    APPENDECTOMY      BACK SURGERY      CORONARY STENT PLACEMENT      2     CV CORONARY ANGIOGRAM N/A 3/28/2021    Procedure: Coronary Angiogram;  Surgeon: Narinder Ryan MD;  Location: [facility] Cardiac Cath Lab;  Service: Cardiology    CV LEFT HEART CATHETERIZATION WITH LEFT VENTRICULOGRAM N/A 3/28/2021    Procedure: Left Heart Catheterization with Left Ventriculogram;  Surgeon: Narinder Ryan MD;  Location: [facility] Cardiac Cath Lab;  Service: Cardiology    femoral stents  2011    IR ABDOMINAL AORTOGRAM  8/25/2011    IR MISCELLANEOUS PROCEDURE  8/25/2011    OOPHORECTOMY      STENT,CORONARY, S660 24/30 2007    VASCULAR SURGERY      stents legs          CURRENT MEDICATIONS:    aspirin 81 MG EC tablet  atorvastatin (LIPITOR) 80 MG tablet  clopidogrel (PLAVIX) 75 MG tablet  donepezil (ARICEPT) 10 MG tablet  isosorbide mononitrate (IMDUR) 30 MG 24 hr tablet  nitroGLYcerin (NITROSTAT) 0.4 MG sublingual tablet  omeprazole (PRILOSEC) 20 MG DR capsule  QUEtiapine (SEROQUEL) 25 MG tablet        ALLERGIES:  Allergies   Allergen Reactions    Nickel Rash       FAMILY  HISTORY:  Family History   Problem Relation Age of Onset    Coronary Artery Disease Father     Prostate Cancer Father     Diabetes No family hx of     Hypertension No family hx of     Breast Cancer No family hx of     Colon Cancer No family hx of     Other Cancer No family hx of     Kidney Disease Mother     Heart Disease Father     Heart Disease Brother     Hypertension Brother        SOCIAL HISTORY:   Social History     Socioeconomic History    Marital status:     Number of children: 2   Tobacco Use    Smoking status: Every Day     Types: Cigarettes     Last attempt to quit: 2007     Years since quittin.1    Smokeless tobacco: Never   Substance and Sexual Activity    Alcohol use: Yes     Comment: Alcoholic Drinks/day: occasional beer or bloody randall    Drug use: No   Social History Narrative    She lives by herself with her dog.  She has neighbors around.  Her son and family live 10 minutes away.    Alva Zimmer MD  2019        Patient with dementia.  Now lives with son-in-law and     family.  Accompanying this clinic with daughter Mouna        Alva Zimmer MD     Social Determinants of Health     Financial Resource Strain: High Risk (2023)    Financial Resource Strain     Within the past 12 months, have you or your family members you live with been unable to get utilities (heat, electricity) when it was really needed?: Yes   Food Insecurity: Low Risk  (2023)    Food Insecurity     Within the past 12 months, did you worry that your food would run out before you got money to buy more?: No     Within the past 12 months, did the food you bought just not last and you didn t have money to get more?: No   Transportation Needs: Low Risk  (2023)    Transportation Needs     Within the past 12 months, has lack of transportation kept you from medical appointments, getting your medicines, non-medical meetings or appointments, work, or from getting things that you need?: No    Interpersonal Safety: Low Risk  (9/25/2023)    Interpersonal Safety     Do you feel physically and emotionally safe where you currently live?: Yes     Within the past 12 months, have you been hit, slapped, kicked or otherwise physically hurt by someone?: No     Within the past 12 months, have you been humiliated or emotionally abused in other ways by your partner or ex-partner?: No   Housing Stability: Low Risk  (9/25/2023)    Housing Stability     Do you have housing? : Yes     Are you worried about losing your housing?: No       PHYSICAL EXAM:    BP (!) 187/81   Pulse 57   Temp 97.7  F (36.5  C) (Oral)   Resp 11   SpO2 99%         Constitutional: She is awake and responsive.  She has cognitive and memory impairment  Head: Normocephalic, atraumatic.  ENT: Mucous membranes are moist.  No pallor.   Eyes: Pupils are reactive.  No discoloration.  Neck: No lymphadenopathy, no stridor, supple, no soft tissue swelling  Chest: No tenderness   Respiratory: Respirations even, unlabored. Lungs clear to ascultation bilaterally, in no acute respiratory distress.  Cardiovascular: Regular rate and rhythm.  Good overall perfusion.  Upper and lower extremity pulses are equal.  GI: Abdomen soft, non-tender to palpation.  No guarding or rebound. Bowel sounds present throughout.   Back: No CVA tenderness.    Musculoskeletal: Moves all 4 extremities equally, full function and capacity no peripheral edema.   Integument: Warm, dry. No rash. No bruising or petechiae.  Neurologic: Alert & oriented x 3. Normal speech.  Cognitive and memory impairment  Psychiatric: Normal mood and affect.  Appropriate judgement.    ED COURSE:  1:24 PM I met with the patient, obtained history, performed an initial exam, and discussed options and plan for diagnostics and treatment here in the ED.  3:21 PM I spoke with patient's daughter.          Medical Decision Making    History:  Supplemental history from: Family, care center,   External Record(s)  reviewed: External medical records including care everywhere reviewed [facility] ED visit 3/18/2024    Work Up:  EKG, laboratory and imaging studies as ordered were independently interpreted by myself.   Broad differential diagnosis considered for syncope  The patient's presentation was of high complexity.       Complicating factors:  Patient has a complicated past medical history including: Dementia, HTN, HLD, CAD, PVD  Care affected by social determinants of health: Access to primary care    Disposition involved shared decision-making with the patient.      LAB:  Laboratory results were independently reviewed and interpreted  Results for orders placed or performed during the hospital encounter of 03/21/24   Basic metabolic panel   Result Value Ref Range    Sodium 143 135 - 145 mmol/L    Potassium 4.3 3.4 - 5.3 mmol/L    Chloride 107 98 - 107 mmol/L    Carbon Dioxide (CO2) 27 22 - 29 mmol/L    Anion Gap 9 7 - 15 mmol/L    Urea Nitrogen 8.6 8.0 - 23.0 mg/dL    Creatinine 0.73 0.51 - 0.95 mg/dL    GFR Estimate 80 >60 mL/min/1.73m2    Calcium 8.8 8.8 - 10.2 mg/dL    Glucose 71 70 - 99 mg/dL   CBC with platelets and differential   Result Value Ref Range    WBC Count 6.1 4.0 - 11.0 10e3/uL    RBC Count 3.59 (L) 3.80 - 5.20 10e6/uL    Hemoglobin 11.2 (L) 11.7 - 15.7 g/dL    Hematocrit 33.6 (L) 35.0 - 47.0 %    MCV 94 78 - 100 fL    MCH 31.2 26.5 - 33.0 pg    MCHC 33.3 31.5 - 36.5 g/dL    RDW 23.0 (H) 10.0 - 15.0 %    Platelet Count 183 150 - 450 10e3/uL    % Neutrophils 50 %    % Lymphocytes 32 %    % Monocytes 17 %    % Eosinophils 1 %    % Basophils 0 %    % Immature Granulocytes 0 %    NRBCs per 100 WBC 0 <1 /100    Absolute Neutrophils 3.0 1.6 - 8.3 10e3/uL    Absolute Lymphocytes 1.9 0.8 - 5.3 10e3/uL    Absolute Monocytes 1.0 0.0 - 1.3 10e3/uL    Absolute Eosinophils 0.0 0.0 - 0.7 10e3/uL    Absolute Basophils 0.0 0.0 - 0.2 10e3/uL    Absolute Immature Granulocytes 0.0 <=0.4 10e3/uL    Absolute NRBCs 0.0  10e3/uL   Extra Green Top (Lithium Heparin) ON ICE   Result Value Ref Range    Hold Specimen JIC              EKG:    ECG results from 04/15/22   ECG 12-LEAD WITH MUSE (LHE)     Value    Systolic Blood Pressure     Diastolic Blood Pressure     Ventricular Rate 60    Atrial Rate 60    NY Interval 156    QRS Duration 84        QTc 470    P Axis 72    R AXIS -5    T Axis -45    Interpretation ECG      Sinus rhythm  Cannot rule out Anterior infarct (cited on or before 07-JUN-2021)  ST & T wave abnormality, consider inferolateral ischemia  Abnormal ECG  When compared with ECG of 07-JUN-2021 04:43,  Incomplete right bundle branch block is no longer Present  Confirmed by SEE ED PROVIDER NOTE FOR, ECG INTERPRETATION ([id number]),  PEPPER MERCADO ([id number]) on 4/16/2022 7:19:34 AM         I have independently reviewed and interpreted the EKG(s) documented above.            MEDICATIONS GIVEN IN THE EMERGENCY:  Medications   sodium chloride 0.9% BOLUS 500 mL (0 mLs Intravenous Stopped 3/21/24 1446)   OLANZapine (zyPREXA) IV injection 1.25 mg (1.25 mg Intravenous $Given 3/21/24 1533)   OLANZapine zydis (zyPREXA) ODT tab 5 mg (5 mg Oral $Given 3/21/24 1605)   OLANZapine (zyPREXA) IV injection 2.5 mg (2.5 mg Intravenous $Given 3/21/24 1601)           NEW PRESCRIPTIONS STARTED AT TODAY'S ER VISIT:  New Prescriptions    No medications on file                FINAL DIAGNOSIS:    ICD-10-CM    1. Syncope, unspecified syncope type  R55                  NAME: Crista Harman  AGE: 88 year old female  YOB: 1935  MRN: [id number]  EVALUATION DATE & TIME: 3/21/2024  1:15 PM    PCP: Alva Zimmer    ED PROVIDER: Thom Werner M.D.      LINH, Kym Christianson, am serving as a scribe to document services personally performed by Dr. Thom Werner based on my observation and the provider's statements to me. I, Thom Werner MD attest that Kym Christianson is acting in a scribe capacity, has observed my performance of the services and  has documented them in accordance with my direction.    Thom Werner M.D.  Emergency Medicine  [facility] EMERGENCY DEPARTMENT  [facility] [facility] [id number] [phone]  Dept: [phone]  3/21/2024         Thom Werner MD  03/21/24 7140

## 2024-03-21 NOTE — ED TRIAGE NOTES
"Arrived via [facility] from Memory Care unit.  Just moved there 1 week ago and having trouble adapting, staff noticed incrreased depression.  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  Pt was noted by one of the Maintenance rachaeljo rubi out of her room , sat on the couch and slumped down, unconscious for 2 mins.  Then awake when 911 arrived.  Per EMS pt has been verbalizing \" she does not want to live anymore and I want to kill myself' en route.  Daughter in law /son aware that pt was brought here     Triage Assessment (Adult)       Row Name 03/21/24 1328          Triage Assessment    Airway WDL WDL        Respiratory WDL    Respiratory WDL WDL                     "

## 2024-03-21 NOTE — ED NOTES
Pt in hallway refusing to go back to room.  When staff told pt she must return to room, pt started getting physically and verbally aggressive with staff.  Security took pt by the hand and author assisted getting pt back to her bed.  Pt then started getting increasingly agitated and started physical with staff.  Pt was started in soft wrist and ankle restraints after order was placed with MD.  Pt was given IV zyprexa for agitation.

## 2024-03-21 NOTE — ED TRIAGE NOTES
"Pt was just here a few days ago and cleared cardiac standpoint.  Here due to syncope for approx 2 mins.  Pt appears very depressed and verbalized\" Nobody cares for me, I just exist.\"       Triage Assessment (Adult)       Row Name 03/21/24 1320          Triage Assessment    Airway WDL WDL        Respiratory WDL    Respiratory WDL WDL                     "

## 2024-03-21 NOTE — DISCHARGE INSTRUCTIONS
She was seen today for fainting episode  Vital signs in the emergency department workup are normal  She was given a dose of medication to calm her  Safe to return to care center

## 2024-03-21 NOTE — ED NOTES
Bed: JNED-08  Expected date: 3/21/24  Expected time: 12:54 PM  Means of arrival:   Comments:  [facility]/memory care/ syncopal episode

## 2024-05-01 ENCOUNTER — HOSPITAL ENCOUNTER (EMERGENCY)
Facility: HOSPITAL | Age: 89
Discharge: HOME | End: 2024-05-01
Admitting: EMERGENCY MEDICINE
Payer: COMMERCIAL

## 2024-05-01 VITALS
RESPIRATION RATE: 16 BRPM | TEMPERATURE: 98.2 F | HEART RATE: 56 BPM | OXYGEN SATURATION: 100 % | DIASTOLIC BLOOD PRESSURE: 72 MMHG | SYSTOLIC BLOOD PRESSURE: 153 MMHG

## 2024-05-01 DIAGNOSIS — R04.0 EPISTAXIS: ICD-10-CM

## 2024-05-01 LAB
HGB BLD-MCNC: 11.1 G/DL (ref 11.7–15.7)
HOLD SPECIMEN: NORMAL

## 2024-05-01 PROCEDURE — 85018 HEMOGLOBIN: CPT | Performed by: EMERGENCY MEDICINE

## 2024-05-01 PROCEDURE — 36415 COLL VENOUS BLD VENIPUNCTURE: CPT | Performed by: STUDENT IN AN ORGANIZED HEALTH CARE EDUCATION/TRAINING PROGRAM

## 2024-05-01 PROCEDURE — 250N000009 HC RX 250: Performed by: EMERGENCY MEDICINE

## 2024-05-01 PROCEDURE — 99283 EMERGENCY DEPT VISIT LOW MDM: CPT

## 2024-05-01 RX ORDER — OXYMETAZOLINE HYDROCHLORIDE 0.05 G/100ML
2 SPRAY NASAL ONCE
Status: COMPLETED | OUTPATIENT
Start: 2024-05-01 | End: 2024-05-01

## 2024-05-01 RX ORDER — TRANEXAMIC ACID 100 MG/ML
500 INJECTION, SOLUTION INTRAVENOUS ONCE
Status: DISCONTINUED | OUTPATIENT
Start: 2024-05-01 | End: 2024-05-01

## 2024-05-01 RX ADMIN — OXYMETAZOLINE HYDROCHLORIDE 2 SPRAY: 0.05 SPRAY NASAL at 19:14

## 2024-05-01 ASSESSMENT — COLUMBIA-SUICIDE SEVERITY RATING SCALE - C-SSRS
6. HAVE YOU EVER DONE ANYTHING, STARTED TO DO ANYTHING, OR PREPARED TO DO ANYTHING TO END YOUR LIFE?: NO
1. IN THE PAST MONTH, HAVE YOU WISHED YOU WERE DEAD OR WISHED YOU COULD GO TO SLEEP AND NOT WAKE UP?: NO
2. HAVE YOU ACTUALLY HAD ANY THOUGHTS OF KILLING YOURSELF IN THE PAST MONTH?: NO

## 2024-05-01 ASSESSMENT — ENCOUNTER SYMPTOMS
FEVER: 0
VOMITING: 0
CHILLS: 0
LIGHT-HEADEDNESS: 0
DIZZINESS: 0
NAUSEA: 0

## 2024-05-01 ASSESSMENT — ACTIVITIES OF DAILY LIVING (ADL)
ADLS_ACUITY_SCORE: 38
ADLS_ACUITY_SCORE: 38

## 2024-05-01 NOTE — ED NOTES
Bed: JNED-28  Expected date: 5/1/24  Expected time: 6:24 PM  Means of arrival: Ambulance  Comments:  [facility]  88F  Nose Bleed  HTN

## 2024-05-01 NOTE — ED PROVIDER NOTES
EMERGENCY DEPARTMENT ENCOUNTER      NAME: Crista Harman  AGE: 88 year old female  YOB: 1935  MRN: [id number]  EVALUATION DATE & TIME: 5/1/2024  6:40 PM    PCP: Cristiano Mariee    ED PROVIDER: Karen Pascual PA-C      Chief Complaint   Patient presents with    Epistaxis       FINAL IMPRESSION:  1. Epistaxis          ED COURSE & MEDICAL DECISION MAKING:    Pertinent Labs & Imaging studies reviewed. (See chart for details)    88 year old female presents to the Emergency Department for evaluation of nosebleed.     Physical exam is remarkable for a generally well appearing elderly female who is in no acute distress. She has bright red blood coming from the left anterior nostril, no bleeding in the right nostril. Oropharynx clear without any blood noted. Vital signs remarkable for hypertension but otherwise stable and she is afebrile.     Hemoglobin checked as patient has reportedly been having nosebleeds for the last few weeks, this was at her baseline at 11.1.     Afrin was administered into the nostrils and a nasal clamp was applied with resolution of bleeding. I do not think any further emergent labs or imaging are indicated at this time as the patient had resolution of her symptoms. She denies any symptoms concerning for infection or significant blood loss and her hemoglobin is stable. I did not see any vessels that would be amenable to cautery at this time. Discussed management of symptoms at home and patient was provided nasal clamp. Recommend follow up with her PCP for recheck if needed and return here for any new or worsening symptoms. Patient and family members are agreeable with this treatment plan and verbalized understanding.     Medical Decision Making    History:  Supplemental history from: Family Member/Significant Other  External Record(s) reviewed: Inpatient Record: ED visit on 03/21/24 for syncope    Work Up:  Chart documentation includes differential considered and any EKGs or imaging  independently interpreted by provider, where specified.  In additional to work up documented, I considered the following work up: Documented in chart, if applicable.    External consultation:  Discussion of management with another provider: Documented in chart, if applicable    Complicating factors:  Care impacted by chronic illness: Heart Disease, Hypertension, Mental Health, and Peripheral Vascular Disease  Care affected by social determinants of health: N/A    Disposition considerations: Discharge. No recommendations on prescription strength medication(s). N/A.    ED Course   6:55 PM Performed my initial history and physical exam. Discussed workup in the emergency department, management of symptoms, and likely disposition.   7:51 PM I rechecked the patient and updated the patient and family on the plan for care. Patient is not actively bleeding anymore, but I will continue to monitor.  8:10 PM I reassessed the patient. No recurrent bleeding. I discussed the plan for discharge with the patient and family.     At the conclusion of the encounter I discussed the results of all of the tests and the disposition. The questions were answered. The patient or family acknowledged understanding and was agreeable with the care plan.     Voice recognition software was used in the creation of this note. Any grammatical or nonsensical errors are due to inherent errors with the software and are not the intention of the writer.     MEDICATIONS GIVEN IN THE EMERGENCY:  Medications   oxymetazoline (AFRIN) 0.05 % spray 2 spray (2 sprays Nasal $Given 5/1/24 1914)       NEW PRESCRIPTIONS STARTED AT TODAY'S ER VISIT  New Prescriptions    No medications on file       =================================================================    HPI    Patient information was obtained from: Patient and son    Use of : N/A       Crista Harman is a 88 year old female with PMH of CAD, HTN, dementia who presents for epistaxis.    Patient  "states she is having some bleeding from her nose that started a \"little bit ago\" today. Denies any injury, fall, nose picking, or blowing nose previous to the bleeding beginning. Does not feeling any bleeding down her throat. Denies dizziness or lightheadedness.    Patient's son mentioned the patient has been having nosebleeds every couple days for the last few weeks. Affirms the patient is on Plavix. Mentioned the patient was on aspirin, but that got discontinued 2-3 days ago.       REVIEW OF SYSTEMS   Review of Systems   Constitutional:  Negative for chills and fever.   HENT:  Positive for nosebleeds.    Gastrointestinal:  Negative for nausea and vomiting.   Neurological:  Negative for dizziness and light-headedness.     All other systems reviewed and are negative unless noted in HPI.    PAST MEDICAL HISTORY:  Past Medical History:   Diagnosis Date    Acute non-ST elevation myocardial infarction (NSTEMI) (H) 3/28/2021    CAD (coronary artery disease)     CAD (coronary artery disease)     Colitis 1/22/2015    Coronary artery disease due to lipid rich plaque 12/18/2012    Dyslipidemia, goal LDL below 70 12/18/2012    Essential hypertension 12/18/2012    History of heart attack     History of tobacco abuse     HTN (hypertension)     Hyperlipidemia     Hypertension     Peripheral vascular disease (H24)        PAST SURGICAL HISTORY:  Past Surgical History:   Procedure Laterality Date    APPENDECTOMY      BACK SURGERY      CORONARY STENT PLACEMENT      2     CV CORONARY ANGIOGRAM N/A 3/28/2021    Procedure: Coronary Angiogram;  Surgeon: Narinder Ryan MD;  Location: [facility] Cardiac Cath Lab;  Service: Cardiology    CV LEFT HEART CATHETERIZATION WITH LEFT VENTRICULOGRAM N/A 3/28/2021    Procedure: Left Heart Catheterization with Left Ventriculogram;  Surgeon: Narinder Ryan MD;  Location: [facility] Cardiac Cath Lab;  Service: Cardiology    femoral stents  2011    IR ABDOMINAL AORTOGRAM  8/25/2011    IR " MISCELLANEOUS PROCEDURE  2011    OOPHORECTOMY      STENT,CORONARY, S660 2007    VASCULAR SURGERY      stents legs        CURRENT MEDICATIONS:    aspirin 81 MG EC tablet  atorvastatin (LIPITOR) 80 MG tablet  clopidogrel (PLAVIX) 75 MG tablet  donepezil (ARICEPT) 10 MG tablet  isosorbide mononitrate (IMDUR) 30 MG 24 hr tablet  nitroGLYcerin (NITROSTAT) 0.4 MG sublingual tablet  omeprazole (PRILOSEC) 20 MG DR capsule  QUEtiapine (SEROQUEL) 25 MG tablet      ALLERGIES:  Allergies   Allergen Reactions    Nickel Rash       FAMILY HISTORY:  Family History   Problem Relation Age of Onset    Coronary Artery Disease Father     Prostate Cancer Father     Diabetes No family hx of     Hypertension No family hx of     Breast Cancer No family hx of     Colon Cancer No family hx of     Other Cancer No family hx of     Kidney Disease Mother     Heart Disease Father     Heart Disease Brother     Hypertension Brother        SOCIAL HISTORY:   Social History     Socioeconomic History    Marital status:     Number of children: 2   Tobacco Use    Smoking status: Every Day     Current packs/day: 0.00     Types: Cigarettes     Last attempt to quit: 2007     Years since quittin.2    Smokeless tobacco: Never   Substance and Sexual Activity    Alcohol use: Yes     Comment: Alcoholic Drinks/day: occasional beer or bloody randall    Drug use: No   Social History Narrative    She lives by herself with her dog.  She has neighbors around.  Her son and family live 10 minutes away.    Alva Zimmer MD  2019        Patient with dementia.  Now lives with son-in-law and     family.  Accompanying this clinic with daughter Mouna        Alva Zimmer MD     Social Determinants of Health     Financial Resource Strain: High Risk (2023)    Financial Resource Strain     Within the past 12 months, have you or your family members you live with been unable to get utilities (heat, electricity) when it was really needed?:  Yes   Food Insecurity: Low Risk  (9/25/2023)    Food Insecurity     Within the past 12 months, did you worry that your food would run out before you got money to buy more?: No     Within the past 12 months, did the food you bought just not last and you didn t have money to get more?: No   Transportation Needs: Low Risk  (9/25/2023)    Transportation Needs     Within the past 12 months, has lack of transportation kept you from medical appointments, getting your medicines, non-medical meetings or appointments, work, or from getting things that you need?: No    Received from [facility], [facility]    Social Connections   Interpersonal Safety: Low Risk  (9/25/2023)    Interpersonal Safety     Do you feel physically and emotionally safe where you currently live?: Yes     Within the past 12 months, have you been hit, slapped, kicked or otherwise physically hurt by someone?: No     Within the past 12 months, have you been humiliated or emotionally abused in other ways by your partner or ex-partner?: No   Housing Stability: Low Risk  (9/25/2023)    Housing Stability     Do you have housing? : Yes     Are you worried about losing your housing?: No       VITALS:  Patient Vitals for the past 24 hrs:   BP Temp Pulse Resp SpO2   05/01/24 2017 -- -- 55 -- 100 %   05/01/24 2000 (!) 153/72 -- 53 -- 100 %   05/01/24 1842 (!) 202/80 98.2  F (36.8  C) 57 16 99 %       PHYSICAL EXAM    VITAL SIGNS: BP (!) 153/72   Pulse 55   Temp 98.2  F (36.8  C)   Resp 16   SpO2 100%   General Appearance: Alert, cooperative, normal speech and facial symmetry, appears stated age, the patient does not appear in distress  Head:  Normocephalic, without obvious abnormality, atraumatic  Eyes: Conjunctiva/corneas clear, EOM's intact, no nystagmus, PERRL  ENT: Bright red blood coming from the left anterior nostril, no bleeding in the right nostril. Oropharynx clear without any blood  noted. Lips, mucosa, and tongue normal; teeth and gums normal, no pharyngeal inflammation, no dysphonia or difficulty swallowing, membranes are moist without pallor  Cardio:  Regular rate and rhythm, S1 and S2 normal, no murmur, rub    or gallop, 2+ pulses symmetric in all extremities  Pulm:  Clear to auscultation bilaterally, respirations unlabored with no accessory muscle use  Neuro: Patient is awake, alert, and responsive to voice. No gross motor weaknesses or sensory loss; moves all extremities.    LAB:  All pertinent labs reviewed and interpreted.  Labs Ordered and Resulted from Time of ED Arrival to Time of ED Departure   HEMOGLOBIN - Abnormal       Result Value    Hemoglobin 11.1 (*)        RADIOLOGY:  Reviewed all pertinent imaging. Please see official radiology report.  No orders to display       IClari, am serving as a scribe to document services personally performed by Karen Pascual PA-C based on my observation and the provider's statements to me. IKaren PA-C attest that Clari Kraus is acting in a scribe capacity, has observed my performance of the services and has documented them in accordance with my direction.     Karen Pascual PA-C  Emergency Medicine  [facility] EMERGENCY DEPARTMENT  [facility] [facility] [address] [phone]  Dept: [phone]       Karen Pascual PA-C  05/01/24 2975

## 2024-05-01 NOTE — ED TRIAGE NOTES
The patient arrives by [facility] ems. She comes from [facility]. She started having a nose bleed at 1815. She has been having a nose bleed ever other day for the past 2 weeks. She arrives at [facility] with a nose clamp on and bleeding controlled. EMS reports she is A&Ox1. She is A&Ox2 in her room.      Triage Assessment (Adult)       Row Name 05/01/24 5077          Triage Assessment    Airway WDL WDL        Cognitive/Neuro/Behavioral WDL    Cognitive/Neuro/Behavioral WDL WDL

## 2024-05-02 NOTE — DISCHARGE INSTRUCTIONS
You were seen here today for evaluation of nosebleed.  Your hemoglobin was at your baseline with no evidence of significant blood loss.    If your nose starts bleeding again, spray 2 squirts of Afrin in each nostril and apply the clamp for 20 to 30 minutes.    Use the Afrin 2 squirts in each nostril twice daily morning and night for the next 3 days regardless of bleeding or not.    After 3 days, you may apply a tiny amount of Vaseline with a Q-tip into the opening of your nostrils and I would recommend a humidifier as frequent bloody noses often indicate dryness in the air.    Return to the emergency department for any new or worsening symptoms including severe pain, fever, bleeding that does not stop with Afrin plus clamp at home, lightheadedness or dizziness, passing out, or any other symptoms that concern you.

## 2024-05-02 NOTE — ED NOTES
Dried blood cleaned from nose and hands.    Discharge    Discharge paperwork discussed. Patient questions answered. AVS in hand. Patient discharged from ED.

## 2024-07-29 ENCOUNTER — LAB REQUISITION (OUTPATIENT)
Dept: LAB | Facility: CLINIC | Age: 89
End: 2024-07-29
Payer: COMMERCIAL

## 2024-07-29 DIAGNOSIS — F03.90 UNSPECIFIED DEMENTIA, UNSPECIFIED SEVERITY, WITHOUT BEHAVIORAL DISTURBANCE, PSYCHOTIC DISTURBANCE, MOOD DISTURBANCE, AND ANXIETY (H): ICD-10-CM

## 2024-08-01 LAB
ALBUMIN SERPL BCG-MCNC: 4.5 G/DL (ref 3.5–5.2)
ALP SERPL-CCNC: 49 U/L (ref 40–150)
ALT SERPL W P-5'-P-CCNC: 13 U/L (ref 0–50)
ANION GAP SERPL CALCULATED.3IONS-SCNC: 11 MMOL/L (ref 7–15)
AST SERPL W P-5'-P-CCNC: 28 U/L (ref 0–45)
BILIRUB SERPL-MCNC: 0.4 MG/DL
BUN SERPL-MCNC: 8.1 MG/DL (ref 8–23)
CALCIUM SERPL-MCNC: 9.3 MG/DL (ref 8.8–10.4)
CHLORIDE SERPL-SCNC: 105 MMOL/L (ref 98–107)
CREAT SERPL-MCNC: 0.79 MG/DL (ref 0.51–0.95)
EGFRCR SERPLBLD CKD-EPI 2021: 72 ML/MIN/1.73M2
GLUCOSE SERPL-MCNC: 131 MG/DL (ref 70–99)
HCO3 SERPL-SCNC: 26 MMOL/L (ref 22–29)
POTASSIUM SERPL-SCNC: 4.1 MMOL/L (ref 3.4–5.3)
PROT SERPL-MCNC: 7 G/DL (ref 6.4–8.3)
SODIUM SERPL-SCNC: 142 MMOL/L (ref 135–145)
TSH SERPL DL<=0.005 MIU/L-ACNC: 1.28 UIU/ML (ref 0.3–4.2)
VIT B12 SERPL-MCNC: 542 PG/ML (ref 232–1245)

## 2024-08-01 PROCEDURE — 80053 COMPREHEN METABOLIC PANEL: CPT | Mod: ORL | Performed by: PHYSICIAN ASSISTANT

## 2024-08-01 PROCEDURE — 82607 VITAMIN B-12: CPT | Mod: ORL | Performed by: PHYSICIAN ASSISTANT

## 2024-08-01 PROCEDURE — P9603 ONE-WAY ALLOW PRORATED MILES: HCPCS | Mod: ORL | Performed by: PHYSICIAN ASSISTANT

## 2024-08-01 PROCEDURE — 36415 COLL VENOUS BLD VENIPUNCTURE: CPT | Mod: ORL | Performed by: PHYSICIAN ASSISTANT

## 2024-08-01 PROCEDURE — 84443 ASSAY THYROID STIM HORMONE: CPT | Mod: ORL | Performed by: PHYSICIAN ASSISTANT

## 2025-02-03 ENCOUNTER — LAB REQUISITION (OUTPATIENT)
Dept: LAB | Facility: CLINIC | Age: 89
End: 2025-02-03
Payer: COMMERCIAL

## 2025-02-03 DIAGNOSIS — F39 UNSPECIFIED MOOD (AFFECTIVE) DISORDER: ICD-10-CM

## 2025-02-03 DIAGNOSIS — I25.10 ATHEROSCLEROTIC HEART DISEASE OF NATIVE CORONARY ARTERY WITHOUT ANGINA PECTORIS: ICD-10-CM

## 2025-02-03 DIAGNOSIS — F03.B3 UNSPECIFIED DEMENTIA, MODERATE, WITH MOOD DISTURBANCE (H): ICD-10-CM

## 2025-02-06 LAB
ANION GAP SERPL CALCULATED.3IONS-SCNC: 12 MMOL/L (ref 7–15)
BUN SERPL-MCNC: 8.2 MG/DL (ref 8–23)
CALCIUM SERPL-MCNC: 9.5 MG/DL (ref 8.8–10.4)
CHLORIDE SERPL-SCNC: 103 MMOL/L (ref 98–107)
CREAT SERPL-MCNC: 0.73 MG/DL (ref 0.51–0.95)
EGFRCR SERPLBLD CKD-EPI 2021: 79 ML/MIN/1.73M2
ERYTHROCYTE [DISTWIDTH] IN BLOOD BY AUTOMATED COUNT: 21.3 % (ref 10–15)
GLUCOSE SERPL-MCNC: 186 MG/DL (ref 70–99)
HCO3 SERPL-SCNC: 23 MMOL/L (ref 22–29)
HCT VFR BLD AUTO: 39.6 % (ref 35–47)
HGB BLD-MCNC: 13.2 G/DL (ref 11.7–15.7)
LDLC SERPL DIRECT ASSAY-MCNC: 80 MG/DL
MCH RBC QN AUTO: 30.6 PG (ref 26.5–33)
MCHC RBC AUTO-ENTMCNC: 33.3 G/DL (ref 31.5–36.5)
MCV RBC AUTO: 92 FL (ref 78–100)
PLATELET # BLD AUTO: 169 10E3/UL (ref 150–450)
POTASSIUM SERPL-SCNC: 4.4 MMOL/L (ref 3.4–5.3)
RBC # BLD AUTO: 4.31 10E6/UL (ref 3.8–5.2)
SODIUM SERPL-SCNC: 138 MMOL/L (ref 135–145)
WBC # BLD AUTO: 5.7 10E3/UL (ref 4–11)

## 2025-02-06 PROCEDURE — 36415 COLL VENOUS BLD VENIPUNCTURE: CPT | Mod: ORL | Performed by: FAMILY MEDICINE

## 2025-02-06 PROCEDURE — 85027 COMPLETE CBC AUTOMATED: CPT | Mod: ORL | Performed by: FAMILY MEDICINE

## 2025-02-06 PROCEDURE — 80048 BASIC METABOLIC PNL TOTAL CA: CPT | Mod: ORL | Performed by: FAMILY MEDICINE

## 2025-02-06 PROCEDURE — 83721 ASSAY OF BLOOD LIPOPROTEIN: CPT | Mod: ORL | Performed by: FAMILY MEDICINE

## 2025-02-06 PROCEDURE — P9604 ONE-WAY ALLOW PRORATED TRIP: HCPCS | Mod: ORL | Performed by: FAMILY MEDICINE

## 2025-02-24 ENCOUNTER — LAB REQUISITION (OUTPATIENT)
Dept: LAB | Facility: CLINIC | Age: 89
End: 2025-02-24
Payer: COMMERCIAL

## 2025-02-24 DIAGNOSIS — K21.9 GASTRO-ESOPHAGEAL REFLUX DISEASE WITHOUT ESOPHAGITIS: ICD-10-CM

## 2025-02-24 DIAGNOSIS — F03.B3 UNSPECIFIED DEMENTIA, MODERATE, WITH MOOD DISTURBANCE (H): ICD-10-CM

## 2025-02-27 LAB
ANION GAP SERPL CALCULATED.3IONS-SCNC: 15 MMOL/L (ref 7–15)
BUN SERPL-MCNC: 9.2 MG/DL (ref 8–23)
CALCIUM SERPL-MCNC: 10 MG/DL (ref 8.8–10.4)
CHLORIDE SERPL-SCNC: 105 MMOL/L (ref 98–107)
CREAT SERPL-MCNC: 0.71 MG/DL (ref 0.51–0.95)
EGFRCR SERPLBLD CKD-EPI 2021: 82 ML/MIN/1.73M2
GLUCOSE SERPL-MCNC: 131 MG/DL (ref 70–99)
HCO3 SERPL-SCNC: 23 MMOL/L (ref 22–29)
POTASSIUM SERPL-SCNC: 4.1 MMOL/L (ref 3.4–5.3)
SODIUM SERPL-SCNC: 143 MMOL/L (ref 135–145)

## 2025-02-27 PROCEDURE — 80048 BASIC METABOLIC PNL TOTAL CA: CPT | Mod: ORL | Performed by: PHYSICIAN ASSISTANT

## 2025-02-27 PROCEDURE — P9604 ONE-WAY ALLOW PRORATED TRIP: HCPCS | Mod: ORL | Performed by: PHYSICIAN ASSISTANT

## 2025-02-27 PROCEDURE — 36415 COLL VENOUS BLD VENIPUNCTURE: CPT | Mod: ORL | Performed by: PHYSICIAN ASSISTANT

## 2025-09-04 ENCOUNTER — HOSPITAL ENCOUNTER (INPATIENT)
Facility: HOSPITAL | Age: 89
End: 2025-09-04
Attending: EMERGENCY MEDICINE
Payer: COMMERCIAL

## 2025-09-04 VITALS
OXYGEN SATURATION: 93 % | RESPIRATION RATE: 20 BRPM | SYSTOLIC BLOOD PRESSURE: 141 MMHG | HEART RATE: 79 BPM | DIASTOLIC BLOOD PRESSURE: 65 MMHG | TEMPERATURE: 98.6 F

## 2025-09-04 DIAGNOSIS — S72.001A HIP FRACTURE, RIGHT, CLOSED, INITIAL ENCOUNTER (H): Primary | ICD-10-CM

## 2025-09-04 DIAGNOSIS — S72.001A CLOSED FRACTURE OF RIGHT HIP, INITIAL ENCOUNTER (H): ICD-10-CM

## 2025-09-04 DIAGNOSIS — Z86.59 HISTORY OF DEMENTIA: ICD-10-CM
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
## 2025-09-04 PROBLEM — F39 EPISODIC MOOD DISORDER: Status: ACTIVE | Noted: 2024-04-29

## 2025-09-04 PROBLEM — R26.89 NEED FOR ASSISTANCE DUE TO UNSTEADY GAIT: Status: ACTIVE | Noted: 2024-04-01

## 2025-09-04 PROBLEM — M81.0 OSTEOPOROSIS: Status: ACTIVE | Noted: 2024-04-01

## 2025-09-04 LAB
ABO + RH BLD: NORMAL
ALBUMIN SERPL BCG-MCNC: 4.3 G/DL (ref 3.5–5.2)
ALP SERPL-CCNC: 53 U/L (ref 40–150)
ALT SERPL W P-5'-P-CCNC: 20 U/L (ref 0–50)
ANION GAP SERPL CALCULATED.3IONS-SCNC: 13 MMOL/L (ref 7–15)
AST SERPL W P-5'-P-CCNC: 25 U/L (ref 0–45)
BASOPHILS # BLD AUTO: <0.03 10E3/UL (ref 0–0.2)
BASOPHILS NFR BLD AUTO: 0.2 %
BILIRUB DIRECT SERPL-MCNC: 0.25 MG/DL (ref 0–0.3)
BILIRUB SERPL-MCNC: 0.7 MG/DL
BLD GP AB SCN SERPL QL: NEGATIVE
BUN SERPL-MCNC: 9.2 MG/DL (ref 8–23)
CALCIUM SERPL-MCNC: 8.8 MG/DL (ref 8.8–10.4)
CHLORIDE SERPL-SCNC: 103 MMOL/L (ref 98–107)
CREAT SERPL-MCNC: 0.73 MG/DL (ref 0.51–0.95)
EGFRCR SERPLBLD CKD-EPI 2021: 79 ML/MIN/1.73M2
ELLIPTOCYTES BLD QL SMEAR: SLIGHT
EOSINOPHIL # BLD AUTO: <0.03 10E3/UL (ref 0–0.7)
EOSINOPHIL NFR BLD AUTO: 0.1 %
ERYTHROCYTE [DISTWIDTH] IN BLOOD BY AUTOMATED COUNT: 21.9 % (ref 10–15)
GLUCOSE SERPL-MCNC: 142 MG/DL (ref 70–99)
HCO3 SERPL-SCNC: 26 MMOL/L (ref 22–29)
HCT VFR BLD AUTO: 33.1 % (ref 35–47)
HGB BLD-MCNC: 10.7 G/DL (ref 11.7–15.7)
IMM GRANULOCYTES # BLD: 0.45 10E3/UL
IMM GRANULOCYTES NFR BLD: 3.5 %
INR PPP: 1.16 (ref 0.85–1.15)
LYMPHOCYTES # BLD AUTO: 0.54 10E3/UL (ref 0.8–5.3)
LYMPHOCYTES NFR BLD AUTO: 4.2 %
MCH RBC QN AUTO: 30.1 PG (ref 26.5–33)
MCHC RBC AUTO-ENTMCNC: 32.3 G/DL (ref 31.5–36.5)
MCV RBC AUTO: 93 FL (ref 78–100)
MONOCYTES # BLD AUTO: 1.87 10E3/UL (ref 0–1.3)
MONOCYTES NFR BLD AUTO: 14.4 %
NEUTROPHILS # BLD AUTO: 10.11 10E3/UL (ref 1.6–8.3)
NEUTROPHILS NFR BLD AUTO: 77.6 %
NRBC # BLD AUTO: <0.03 10E3/UL
NRBC BLD AUTO-RTO: 0.2 /100
PLAT MORPH BLD: ABNORMAL
PLATELET # BLD AUTO: 139 10E3/UL (ref 150–450)
POTASSIUM SERPL-SCNC: 3.7 MMOL/L (ref 3.4–5.3)
PROT SERPL-MCNC: 6.6 G/DL (ref 6.4–8.3)
PROTHROMBIN TIME: 15 SECONDS (ref 11.8–14.8)
RBC # BLD AUTO: 3.56 10E6/UL (ref 3.8–5.2)
RBC MORPH BLD: ABNORMAL
SODIUM SERPL-SCNC: 142 MMOL/L (ref 135–145)
SPECIMEN EXP DATE BLD: NORMAL
WBC # BLD AUTO: 13 10E3/UL (ref 4–11)

## 2025-09-04 PROCEDURE — 82248 BILIRUBIN DIRECT: CPT | Performed by: EMERGENCY MEDICINE

## 2025-09-04 PROCEDURE — 36415 COLL VENOUS BLD VENIPUNCTURE: CPT | Performed by: EMERGENCY MEDICINE

## 2025-09-04 PROCEDURE — 85025 COMPLETE CBC W/AUTO DIFF WBC: CPT | Performed by: EMERGENCY MEDICINE

## 2025-09-04 PROCEDURE — 82374 ASSAY BLOOD CARBON DIOXIDE: CPT | Performed by: EMERGENCY MEDICINE

## 2025-09-04 PROCEDURE — 85610 PROTHROMBIN TIME: CPT | Performed by: EMERGENCY MEDICINE

## 2025-09-04 PROCEDURE — 86900 BLOOD TYPING SEROLOGIC ABO: CPT | Performed by: EMERGENCY MEDICINE

## 2025-09-04 PROCEDURE — 93005 ELECTROCARDIOGRAM TRACING: CPT | Performed by: EMERGENCY MEDICINE

## 2025-09-04 RX ORDER — MIRTAZAPINE 15 MG/1
15 TABLET, FILM COATED ORAL AT BEDTIME
COMMUNITY
Start: 2025-02-05

## 2025-09-04 RX ORDER — TRANEXAMIC ACID 650 MG/1
1950 TABLET ORAL ONCE
OUTPATIENT
Start: 2025-09-04 | End: 2025-09-04

## 2025-09-04 RX ORDER — AMOXICILLIN 250 MG
1 CAPSULE ORAL DAILY PRN
COMMUNITY

## 2025-09-04 RX ORDER — CEFAZOLIN SODIUM 2 G/50ML
2 SOLUTION INTRAVENOUS SEE ADMIN INSTRUCTIONS
OUTPATIENT
Start: 2025-09-04

## 2025-09-04 RX ORDER — OLANZAPINE 2.5 MG/1
2.5 TABLET, FILM COATED ORAL 2 TIMES DAILY PRN
COMMUNITY

## 2025-09-04 RX ORDER — MORPHINE SULFATE 30 MG/1
5 TABLET ORAL EVERY 4 HOURS PRN
COMMUNITY

## 2025-09-04 RX ORDER — PSEUDOEPHED/ACETAMINOPH/DIPHEN 30MG-500MG
500-1000 TABLET ORAL EVERY 8 HOURS PRN
COMMUNITY
Start: 2025-08-26

## 2025-09-04 RX ORDER — TRAZODONE HYDROCHLORIDE 50 MG/1
50 TABLET ORAL PRN
COMMUNITY

## 2025-09-04 RX ORDER — HALOPERIDOL 0.5 MG/1
0.5 TABLET ORAL EVERY 6 HOURS PRN
COMMUNITY
Start: 2025-08-26

## 2025-09-04 RX ORDER — CEFAZOLIN SODIUM 2 G/50ML
2 SOLUTION INTRAVENOUS
OUTPATIENT
Start: 2025-09-04

## 2025-09-04 RX ORDER — QUETIAPINE FUMARATE 25 MG/1
12.5 TABLET, FILM COATED ORAL 2 TIMES DAILY
COMMUNITY

## 2025-09-04 ASSESSMENT — ACTIVITIES OF DAILY LIVING (ADL)
ADLS_ACUITY_SCORE: 54
ADLS_ACUITY_SCORE: 54